# Patient Record
Sex: MALE | Race: WHITE | Employment: OTHER | URBAN - METROPOLITAN AREA
[De-identification: names, ages, dates, MRNs, and addresses within clinical notes are randomized per-mention and may not be internally consistent; named-entity substitution may affect disease eponyms.]

---

## 2017-05-26 ENCOUNTER — OFFICE VISIT (OUTPATIENT)
Dept: FAMILY MEDICINE | Facility: CLINIC | Age: 62
End: 2017-05-26

## 2017-05-26 VITALS
SYSTOLIC BLOOD PRESSURE: 128 MMHG | WEIGHT: 193 LBS | BODY MASS INDEX: 27.02 KG/M2 | DIASTOLIC BLOOD PRESSURE: 82 MMHG | TEMPERATURE: 98.6 F | HEIGHT: 71 IN

## 2017-05-26 DIAGNOSIS — Z12.5 SCREENING FOR PROSTATE CANCER: ICD-10-CM

## 2017-05-26 DIAGNOSIS — Z13.220 LIPID SCREENING: ICD-10-CM

## 2017-05-26 DIAGNOSIS — D48.5 NEOPLASM OF UNCERTAIN BEHAVIOR OF SKIN: ICD-10-CM

## 2017-05-26 DIAGNOSIS — Z23 NEED FOR VACCINATION: ICD-10-CM

## 2017-05-26 DIAGNOSIS — Z00.00 ROUTINE GENERAL MEDICAL EXAMINATION AT A HEALTH CARE FACILITY: Primary | ICD-10-CM

## 2017-05-26 DIAGNOSIS — Z11.59 NEED FOR HEPATITIS C SCREENING TEST: ICD-10-CM

## 2017-05-26 DIAGNOSIS — L82.0 INFLAMED SEBORRHEIC KERATOSIS: ICD-10-CM

## 2017-05-26 PROCEDURE — 80061 LIPID PANEL: CPT | Mod: 90 | Performed by: FAMILY MEDICINE

## 2017-05-26 PROCEDURE — 84153 ASSAY OF PSA TOTAL: CPT | Mod: 90 | Performed by: FAMILY MEDICINE

## 2017-05-26 PROCEDURE — 90736 HZV VACCINE LIVE SUBQ: CPT | Performed by: FAMILY MEDICINE

## 2017-05-26 PROCEDURE — 80053 COMPREHEN METABOLIC PANEL: CPT | Mod: 90 | Performed by: FAMILY MEDICINE

## 2017-05-26 PROCEDURE — 36415 COLL VENOUS BLD VENIPUNCTURE: CPT | Performed by: FAMILY MEDICINE

## 2017-05-26 PROCEDURE — 99396 PREV VISIT EST AGE 40-64: CPT | Mod: 25 | Performed by: FAMILY MEDICINE

## 2017-05-26 PROCEDURE — 17110 DESTRUCTION B9 LES UP TO 14: CPT | Performed by: FAMILY MEDICINE

## 2017-05-26 PROCEDURE — 86803 HEPATITIS C AB TEST: CPT | Mod: 90 | Performed by: FAMILY MEDICINE

## 2017-05-26 NOTE — MR AVS SNAPSHOT
After Visit Summary   5/26/2017    Waqar Ariza    MRN: [id number]           Patient Information     Date Of Birth          1955        Visit Information        Provider Department      5/26/2017 7:45 AM Surinder Catherine MD [facility]        Today's Diagnoses     Routine general medical examination at a health care facility    -  1    Lipid screening        Screening for prostate cancer        Need for hepatitis C screening test        Need for vaccination        Neoplasm of uncertain behavior of skin        Inflamed seborrheic keratosis          Care Instructions    Wound Care Instructions for Liquid Nitrogen Treatment   The treatment area will appear white at first. Over the next several hours a fluid-filled blister may form. The blister can be very dark. If blister appears, it will remain blistered for about a week. Then a scab will form over the area.   Leave the blistered area uncovered as long as it remains closed. If the area breaks open, you may cover it with a clean band aid. Do not pull off the skin covering the blister.   If the blistered area becomes uncomfortably filled with fluid, you may release some of the fluid by puncturing the blister with a needle that has been cleansed with alcohol.   If the skin covering the blister comes off, clean the area daily with soap and water. Apply a small amount of Vaseline and then cover with a clean band aid. Change the band aid twice daily until the skin is completely healed.   Call us if.....   1. You have signs of infection such as thick yellow or pus-like drainage from the wound site or a fever over 100 degrees Fahrenheit.   2. You have any questions or are not sure how to take care of the wound.        Preventive Health Recommendations  Male Ages 50 - 64    Yearly exam:             See your health care provider every year in order to  o   Review health changes.   o   Discuss preventive care.    o   Review your medicines if  your doctor has prescribed any.     Have a cholesterol test every 5 years, or more frequently if you are at risk for high cholesterol/heart disease.     Have a diabetes test (fasting glucose) every three years. If you are at risk for diabetes, you should have this test more often.     Have a colonoscopy at age 50, or have a yearly FIT test (stool test). These exams will check for colon cancer.      Talk with your health care provider about whether or not a prostate cancer screening test (PSA) is right for you.    You should be tested each year for STDs (sexually transmitted diseases), if you re at risk.     Shots: Get a flu shot each year. Get a tetanus shot every 10 years.     Nutrition:    Eat at least 5 servings of fruits and vegetables daily.     Eat whole-grain bread, whole-wheat pasta and brown rice instead of white grains and rice.     Talk to your provider about Calcium and Vitamin D.     Lifestyle    Exercise for at least 150 minutes a week (30 minutes a day, 5 days a week). This will help you control your weight and prevent disease.     Limit alcohol to one drink per day.     No smoking.     Wear sunscreen to prevent skin cancer.     See your dentist every six months for an exam and cleaning.     See your eye doctor every 1 to 2 years.            Follow-ups after your visit        Who to contact     If you have questions or need follow up information about today's clinic visit or your schedule please contact [facility] directly at [phone].  Normal or non-critical lab and imaging results will be communicated to you by MyChart, letter or phone within 4 business days after the clinic has received the results. If you do not hear from us within 7 days, please contact the clinic through MyChart or phone. If you have a critical or abnormal lab result, we will notify you by phone as soon as possible.  Submit refill requests through [employer] or call your pharmacy and they will forward the refill  "request to us. Please allow 3 business days for your refill to be completed.          Additional Information About Your Visit        [employer] Information     [employer] gives you secure access to your electronic health record. If you see a primary care provider, you can also send messages to your care team and make appointments. If you have questions, please call your primary care clinic.  If you do not have a primary care provider, please call [phone] and they will assist you.        Care EveryWhere ID     This is your Care EveryWhere ID. This could be used by other organizations to access your [place] medical records  [id number]        Your Vitals Were     Temperature Height BMI (Body Mass Index)             98.6  F (37  C) 1.803 m (5' 11\") 26.92 kg/m2          Blood Pressure from Last 3 Encounters:   05/26/17 128/82   09/01/16 138/88   12/14/15 140/84    Weight from Last 3 Encounters:   05/26/17 87.5 kg (193 lb)   09/01/16 97.5 kg (215 lb)   12/14/15 93.4 kg (206 lb)              We Performed the Following     Comp. Metabolic Panel (14) ([facility])     HCV Antibody ([facility])     Lipid Panel ([facility])     PSA Serum ([facility])     ZOSTER VACC LIVE SUBQ NJX        Primary Care Provider Office Phone # Fax #    Surinder Catherine [phone]       [facility] [address] [facility] [address]        Thank you!     Thank you for choosing [facility]  for your care. Our goal is always to provide you with excellent care. Hearing back from our patients is one way we can continue to improve our services. Please take a few minutes to complete the written survey that you may receive in the mail after your visit with us. Thank you!             Your Updated Medication List - Protect others around you: Learn how to safely use, store and throw away your medicines at www.disposemymeds.org.          This list is accurate as of: 5/26/17  8:29 AM.  Always use your most recent med " list.                   Brand Name Dispense Instructions for use    aspirin 81 MG EC tablet     90 tablet    Take 1 tablet (81 mg) by mouth daily       CALCIUM 600/VITAMIN D PO      Take 1 tablet by mouth daily.       DAILY MULTI PO      Take 1 tablet by mouth daily.       FISH OIL PO      Take 1 tablet by mouth daily.

## 2017-05-26 NOTE — PATIENT INSTRUCTIONS
Wound Care Instructions for Liquid Nitrogen Treatment   The treatment area will appear white at first. Over the next several hours a fluid-filled blister may form. The blister can be very dark. If blister appears, it will remain blistered for about a week. Then a scab will form over the area.   Leave the blistered area uncovered as long as it remains closed. If the area breaks open, you may cover it with a clean band aid. Do not pull off the skin covering the blister.   If the blistered area becomes uncomfortably filled with fluid, you may release some of the fluid by puncturing the blister with a needle that has been cleansed with alcohol.   If the skin covering the blister comes off, clean the area daily with soap and water. Apply a small amount of Vaseline and then cover with a clean band aid. Change the band aid twice daily until the skin is completely healed.   Call us if.....   1. You have signs of infection such as thick yellow or pus-like drainage from the wound site or a fever over 100 degrees Fahrenheit.   2. You have any questions or are not sure how to take care of the wound.        Preventive Health Recommendations  Male Ages 50   64    Yearly exam:             See your health care provider every year in order to  o   Review health changes.   o   Discuss preventive care.    o   Review your medicines if your doctor has prescribed any.     Have a cholesterol test every 5 years, or more frequently if you are at risk for high cholesterol/heart disease.     Have a diabetes test (fasting glucose) every three years. If you are at risk for diabetes, you should have this test more often.     Have a colonoscopy at age 50, or have a yearly FIT test (stool test). These exams will check for colon cancer.      Talk with your health care provider about whether or not a prostate cancer screening test (PSA) is right for you.    You should be tested each year for STDs (sexually transmitted diseases), if you re at risk.      Shots: Get a flu shot each year. Get a tetanus shot every 10 years.     Nutrition:    Eat at least 5 servings of fruits and vegetables daily.     Eat whole-grain bread, whole-wheat pasta and brown rice instead of white grains and rice.     Talk to your provider about Calcium and Vitamin D.     Lifestyle    Exercise for at least 150 minutes a week (30 minutes a day, 5 days a week). This will help you control your weight and prevent disease.     Limit alcohol to one drink per day.     No smoking.     Wear sunscreen to prevent skin cancer.     See your dentist every six months for an exam and cleaning.     See your eye doctor every 1 to 2 years.

## 2017-05-26 NOTE — PROGRESS NOTES
1 SebK's on the face. Itchy, irritating and unsightly.  Also took care of 4 on the back    ROS: No bleeding problems.    cryotherapy applied  Liquid nitrogen was applied for 5-10 seconds x3  to the skin lesions      A:P  Irritated, red and itchy  Tyler K's  The expected blistering or scabbing reaction explained. Do not pick at the areas. Patient reminded to expect hypopigmented scars from the procedure. Return if lesions fail to fully resolve.   Surinder Catherine

## 2017-05-26 NOTE — PROGRESS NOTES
SUBJECTIVE:     CC: Waqar Ariza is an 61 year old male who presents for preventative health visit.     Healthy Habits:    Do you get at least three servings of calcium containing foods daily (dairy, green leafy vegetables, etc.)? yes    Amount of exercise or daily activities, outside of work: 4 day(s) per week    Problems taking medications regularly No    Medication side effects: No    Have you had an eye exam in the past two years? yes    Do you see a dentist twice per year? yes    Do you have sleep apnea, excessive snoring or daytime drowsiness?no        Today's PHQ-2 Score:   PHQ-2 ( 1999 [employer]) 5/26/2017 11/24/2014   Q1: Little interest or pleasure in doing things 0 0   Q2: Feeling down, depressed or hopeless 0 0   PHQ-2 Score 0 0       Abuse: Current or Past(Physical, Sexual or Emotional)- No  Do you feel safe in your environment - Yes    Social History   Substance Use Topics     Smoking status: Never Smoker     Smokeless tobacco: Never Used     Alcohol use Yes      Comment: special occasions only     The patient does not drink >3 drinks per day nor >7 drinks per week.    Last PSA: No results found for: PSA    Recent Labs   Lab Test  11/24/14   1119  08/20/13   1012   CHOL  184  169   HDL  57  52   LDL  106*  89   TRIG  107  139       Reviewed orders with patient. Reviewed health maintenance and updated orders accordingly - Yes    Reviewed and updated as needed this visit by clinical staff  Tobacco         Reviewed and updated as needed this visit by Provider        Past Medical History:   Diagnosis Date     Adenomatous polyp of colon 2007    q 5 year colonoscopy     Mitral valve disorders 11/24/2014     Right fibular fracture 3/2013      Past Surgical History:   Procedure Laterality Date     CATARACT IOL, RT/LT  2004, 2009    Cataract IOL RT/LT     retinal detachment  2005       ROS:  C: NEGATIVE for fever, chills, change in weight  I: NEGATIVE for worrisome rashes, moles or lesions  E: NEGATIVE for  "vision changes or irritation  ENT: NEGATIVE for ear, mouth and throat problems  R: NEGATIVE for significant cough or SOB  CV: NEGATIVE for chest pain, palpitations or peripheral edema  GI: NEGATIVE for nausea, abdominal pain, heartburn, or change in bowel habits   male: negative for dysuria, hematuria, decreased urinary stream, erectile dysfunction, urethral discharge  M: NEGATIVE for significant arthralgias or myalgia  N: NEGATIVE for weakness, dizziness or paresthesias  P: NEGATIVE for changes in mood or affect    BP Readings from Last 3 Encounters:   05/26/17 128/82   09/01/16 138/88   12/14/15 140/84    Wt Readings from Last 3 Encounters:   05/26/17 87.5 kg (193 lb)   09/01/16 97.5 kg (215 lb)   12/14/15 93.4 kg (206 lb)                  OBJECTIVE:     /82  Temp 98.6  F (37  C)  Ht 1.803 m (5' 11\")  Wt 87.5 kg (193 lb)  BMI 26.92 kg/m2  EXAM:  GENERAL: healthy, alert and no distress  EYES: Eyes grossly normal to inspection, PERRL and conjunctivae and sclerae normal  HENT: ear canals and TM's normal, nose and mouth without ulcers or lesions  NECK: no adenopathy, no asymmetry, masses, or scars and thyroid normal to palpation  RESP: lungs clear to auscultation - no rales, rhonchi or wheezes  CV: regular rate and rhythm, normal S1 S2, no S3 or S4, no murmur, click or rub, no peripheral edema and peripheral pulses strong  ABDOMEN: soft, nontender, no hepatosplenomegaly, no masses and bowel sounds normal   (male): normal male genitalia without lesions or urethral discharge, small left  hernia  RECTAL: normal sphincter tone, no rectal masses, prostate normal size, smooth, nontender without nodules or masses  MS: no gross musculoskeletal defects noted, no edema  SKIN: no suspicious lesions or rashes  NEURO: Normal strength and tone, mentation intact and speech normal  PSYCH: mentation appears normal, affect normal/bright    ASSESSMENT/PLAN:     Diagnoses and all orders for this visit:    Routine general " "medical examination at a health care facility  -     Comp. Metabolic Panel (14) ([facility])    Lipid screening  -     Lipid Panel ([facility])    Screening for prostate cancer  -     PSA Serum ([facility])    Need for hepatitis C screening test  -     HCV Antibody ([facility])    Need for vaccination        COUNSELING:  Reviewed preventive health counseling, as reflected in patient instructions       Regular exercise       Healthy diet/nutrition       Colon cancer screening    BP Screening:   Last 3 BP Readings:    BP Readings from Last 3 Encounters:   05/26/17 128/82   09/01/16 138/88   12/14/15 140/84       The following was recommended to the patient:  Re-screen BP within a year and recommended lifestyle modifications     reports that he has never smoked. He has never used smokeless tobacco.    Estimated body mass index is 26.92 kg/(m^2) as calculated from the following:    Height as of this encounter: 1.803 m (5' 11\").    Weight as of this encounter: 87.5 kg (193 lb).       Counseling Resources:  ATP IV Guidelines  Pooled Cohorts Equation Calculator  FRAX Risk Assessment  ICSI Preventive Guidelines  Dietary Guidelines for Americans, 2010  USDA's MyPlate  ASA Prophylaxis  Lung CA Screening    Surinder Catherine MD  [facility]  "

## 2017-05-27 LAB
ALBUMIN SERPL-MCNC: 4.6 G/DL (ref 3.6–4.8)
ALBUMIN/GLOB SERPL: 1.4 {RATIO} (ref 1.2–2.2)
ALP SERPL-CCNC: 51 IU/L (ref 39–117)
ALT SERPL-CCNC: 32 IU/L (ref 0–44)
AST SERPL-CCNC: 28 IU/L (ref 0–40)
BILIRUB SERPL-MCNC: 0.6 MG/DL (ref 0–1.2)
BUN SERPL-MCNC: 15 MG/DL (ref 8–27)
BUN/CREATININE RATIO: 14 (ref 10–24)
CALCIUM SERPL-MCNC: 9.4 MG/DL (ref 8.6–10.2)
CHLORIDE SERPLBLD-SCNC: 99 MMOL/L (ref 96–106)
CHOLEST SERPL-MCNC: 148 MG/DL (ref 100–199)
CREAT SERPL-MCNC: 1.11 MG/DL (ref 0.76–1.27)
EGFR IF AFRICN AM: 82 ML/MIN/1.73
EGFR IF NONAFRICN AM: 71 ML/MIN/1.73
GLOBULIN, TOTAL: 3.2 G/DL (ref 1.5–4.5)
GLUCOSE SERPL-MCNC: 89 MG/DL (ref 65–99)
HCV AB SERPL QL IA: <0.1 S/CO RATIO (ref 0–0.9)
HDLC SERPL-MCNC: 55 MG/DL
LDL/HDL RATIO: 1.4 RATIO UNITS (ref 0–3.6)
LDLC SERPL CALC-MCNC: 75 MG/DL (ref 0–99)
POTASSIUM SERPL-SCNC: 4.5 MMOL/L (ref 3.5–5.2)
PROT SERPL-MCNC: 7.8 G/DL (ref 6–8.5)
PSA NG/ML: 0.4 NG/ML (ref 0–4)
SODIUM SERPL-SCNC: 140 MMOL/L (ref 134–144)
TOTAL CO2: 26 MMOL/L (ref 18–28)
TRIGL SERPL-MCNC: 88 MG/DL (ref 0–149)
VLDLC SERPL CALC-MCNC: 18 MG/DL (ref 5–40)

## 2017-05-30 NOTE — PROGRESS NOTES
Dear Waqar,   I am writing to report that your included test results are within expected ranges. I do not suggest that we make any changes at this time.    Surinder Catherine M.D.

## 2017-07-20 ENCOUNTER — TRANSFERRED RECORDS (OUTPATIENT)
Dept: FAMILY MEDICINE | Facility: CLINIC | Age: 62
End: 2017-07-20

## 2017-11-07 DIAGNOSIS — Z23 NEED FOR PROPHYLACTIC VACCINATION AND INOCULATION AGAINST INFLUENZA: Primary | ICD-10-CM

## 2017-11-07 PROCEDURE — 90686 IIV4 VACC NO PRSV 0.5 ML IM: CPT | Performed by: FAMILY MEDICINE

## 2017-11-07 PROCEDURE — 90471 IMMUNIZATION ADMIN: CPT | Performed by: FAMILY MEDICINE

## 2017-11-07 NOTE — PROGRESS NOTES

## 2018-08-08 ENCOUNTER — HOSPITAL ENCOUNTER (EMERGENCY)
Facility: CLINIC | Age: 63
Discharge: HOME | End: 2018-08-08
Attending: EMERGENCY MEDICINE | Admitting: EMERGENCY MEDICINE
Payer: COMMERCIAL

## 2018-08-08 VITALS
SYSTOLIC BLOOD PRESSURE: 147 MMHG | OXYGEN SATURATION: 98 % | TEMPERATURE: 98.4 F | DIASTOLIC BLOOD PRESSURE: 90 MMHG | RESPIRATION RATE: 16 BRPM

## 2018-08-08 DIAGNOSIS — S01.81XA FACIAL LACERATION, INITIAL ENCOUNTER: ICD-10-CM

## 2018-08-08 PROCEDURE — 99284 EMERGENCY DEPT VISIT MOD MDM: CPT | Mod: 25

## 2018-08-08 PROCEDURE — 12053 INTMD RPR FACE/MM 5.1-7.5 CM: CPT

## 2018-08-08 PROCEDURE — 12011 RPR F/E/E/N/L/M 2.5 CM/<: CPT

## 2018-08-08 ASSESSMENT — ENCOUNTER SYMPTOMS: WOUND: 1

## 2018-08-08 NOTE — DISCHARGE INSTRUCTIONS
* LACERATION (All Closures)  A laceration is a cut through the skin. This will usually require stitches (sutures) or staples if it is deep. Minor cuts may be treated with a tape closure ( Steri-Strips ) or Dermabond skin glue.       HOME CARE:  PAIN MEDICINE: You may use acetaminophen (Tylenol) 650-1000 mg every 6 hours or ibuprofen (Motrin, Advil) 600 mg every 6-8 hours with food to control pain, if you are able to take these medicines. [NOTE: If you have chronic liver or kidney disease or ever had a stomach ulcer or GI bleeding, talk with your doctor before using these medicines.]  EXTREMITY, FACE or TRUNK WOUNDS:    Keep the wound clean and dry. If a bandage was applied and it becomes wet or dirty, replace it. Otherwise, leave it in place for the first 24 hours.    If stitches or staples were used, clean the wound daily. Protect the wound from sunlight and tanning lamps.    After removing the bandage, wash the area with soap and water. Use a wet cotton swab (Q tip) to loosen and remove any blood or crust that forms.    After cleaning, apply a thin layer of Polysporin or Bacitracin ointment. This will keep the wound clean and make it easier to remove the stitches or staples. Reapply a fresh bandage.    You may remove the bandage to shower as usual after the first 24 hours, but do not soak the area in water (no swimming) until the stitches or staples are removed.    If Steri-Strips were used, keep the area clean and dry. If it becomes wet, blot it dry with a towel. It is okay to take a brief shower, but avoid scrubbing the area.    If Dermabond skin adhesive was used, do not scratch, rub or pick at the adhesive film. Do not place tape directly over the film. Do not apply liquid, ointment or creams to the wound while the film is in place. Do not clean the wound with peroxide and do not apply ointments. Avoid activities that cause heavy sweating until the film has fallen off. Protect the wound from prolonged  exposure to sunlight or tanning lamps. You may shower as usual but do not soak the wound in water (no baths or swimming). The film will fall off by itself in 5-10 days.  SCALP WOUNDS: During the first two days, you may carefully rinse your hair in the shower to remove blood, glass or dirt particles. After two days, you may shower and shampoo your hair normally. Do not soak your scalp in the tub or go swimming until the stitches or staples have been removed.  MOUTH WOUNDS: Eat soft foods to reduce pain. If the cut is inside of your mouth, clean by rinsing after each meal and at bedtime with a mixture of equal parts water and Hydrogen Peroxide (do not swallow!). Or, you can use a cotton swab to directly apply Hydrogen Peroxide onto the cut.  After the wound is done healing, use sunscreen over the area whenever exposed for the next 6 minths to avoid a darker scar.     FOLLOW UP: Most skin wounds heal within ten days. Mouth and facial wounds heal within five days. However, even with proper treatment, a wound infection may sometimes occur. Therefore, you should check the wound daily for signs of infection listed below.  Stitches should be removed from the face within five days; stitches and staples should be removed from other parts of the body within 7-10 days. Unless you are told to come back to the emergency room, you may have your doctor or urgent care remove the stitches. If dissolving stitches were used in the mouth, these will fall out or dissolve without the need for removal. If tape closures ( Steri-Strips ) were used, remove them yourself if they have not fallen off after 7 days. If Dermabond skin glue was used, the film will fall off by itself in 5-10 days.      GET PROMPT MEDICAL ATTENTION  if any of the following occur:    Increasing pain in the wound    Redness, swelling or pus coming from the wound    Fever over 101 F (38.3 C) oral    If stitches or staples come apart or fall out or if Steri-Strips fall  off before seven days    If the wound edges re-open    Bleeding not controlled by direct pressure    5566-1023 [employer]. [address] [place] [address], [place] [address]. All rights reserved. This information is not intended as a substitute for professional medical care. Always follow your healthcare professional's instructions.  This information has been modified by your health care provider with permission from the publisher.

## 2018-08-08 NOTE — ED AVS SNAPSHOT
Emergency Department    [address] [place] [address]    Phone:  [phone]    Fax:  [phone]                                       Waqar Ariza   MRN: [id number]    Department:   Emergency Department   Date of Visit:  8/8/2018           After Visit Summary Signature Page     I have received my discharge instructions, and my questions have been answered. I have discussed any challenges I see with this plan with the nurse or doctor.    ..........................................................................................................................................  Patient/Patient Representative Signature      ..........................................................................................................................................  Patient Representative Print Name and Relationship to Patient    ..................................................               ................................................  Date                                            Time    ..........................................................................................................................................  Reviewed by Signature/Title    ...................................................              ..............................................  Date                                                            Time

## 2018-08-08 NOTE — ED PROVIDER NOTES
History     Chief Complaint:  Facial injuries    HPI   Waqar Ariza is a 62 year old male on Aspirin who presents with a facial injury. The patient reports he was walking his dog this morning until he sustained lacerations to his face after walking into a sign post due to the sun being in his eyes. He did not fall or lose consciousness and is currently ambulatory. He has a laceration to the right side of his forehead and extends over his right eye, as well as a laceration to his right cheek. Denies any other trauma, injuries or symptoms. Last tetanus was in 2010.     Allergies:  No Known Allergies     Medications:    Aspirin  Calcium 600  Daily multi  Omega 3 fatty acids     Past Medical History:    Mitral valve disorders  Adenomatous polyp of colon    Past Surgical History:    Cataract IOL  Retinal detachment     Family History:    History reviewed. No pertinent family history.      Social History:  Smoking status: Never smoker  Alcohol use: Yes   Marital Status:  Single [1]  PCP: Surinder Catherine      Review of Systems   Skin: Positive for wound.   Neurological: Negative for syncope.   All other systems reviewed and are negative.    Physical Exam   Patient Vitals for the past 24 hrs:   BP Temp Temp src Heart Rate Resp SpO2   08/08/18 1004 - - - - 16 -   08/08/18 0745 147/90 98.4  F (36.9  C) Oral 89 - -   08/08/18 0744 - - - - 14 98 %      Physical Exam  Constitution: White male supine.  HENT: 6.5 cm L-shaped laceration. Full thickness right forehead extending over supraorbital rim. PERRL. EOMI. 2 cm partial thickness flap laceration over right cheek. No bony tenderness.  Neck: No midline cervical tenderness. Full range of motion.  Neurological: Awake, alert, appropriate. Normal motor sensation and coordination. GCS 15.     Emergency Department Course   Procedure:  The forehead and supraorbital laceration was cleaned with Shur Clens, 0.5% Bupivacaine with epi used for local anesthesia. The wound was  irrigated and explored to the base. No foreign body was seen. It was very flap-like in nature. I used 5.0 Vicryl deep buried subcutaneous stitches and 6.0 Ethylon  skin sutures,  closed in 2 layers. . The laceration over the cheek which is a partial thickness flap, with a small area I could approximate, the rest was more abraded and this was closed with 6.0 Ethylon simple.    Emergency Department Course:  Past medical records, nursing notes, and vitals reviewed.  0757: I performed an exam of the patient and obtained history, as documented above.    0840: I performed a laceration repair as detailed above.  Patient discharged home with instructions regarding supportive care, medications, and reasons to return. The importance of close follow-up was reviewed.      Impression & Plan    Medical Decision Making:  Waqar Ariza is a 62 year old who was walking his dog and because the sun was bright, he could not see where he was going and he walked into a signpost. He suffered a laceration to his forehead and a lesser laceration over the right cheek. He did not hurt his neck, develop a headache, or have any other focal problems. His tetanus is up-to-date. Plan is wound sheets, sutures out in 7-10 days.     Diagnosis:    ICD-10-CM   1. Multiple facial lacerations, initial encounter S01.81XA     Disposition:  discharged to home    Dominga Gutiérrez  8/8/2018    EMERGENCY DEPARTMENT  Dominga BYERS Do, am serving as a scribe at 7:57 AM on 8/8/2018 to document services personally performed by Kal Schulz MD based on my observations and the provider's statements to me.       Kal Schulz MD  08/08/18 6082

## 2018-08-08 NOTE — ED AVS SNAPSHOT
Emergency Department    [address] [facility] [address]    Phone:  [phone]    Fax:  [phone]                                       Waqar Ariza   MRN: [id number]    Department:   Emergency Department   Date of Visit:  8/8/2018           Patient Information     Date Of Birth          1955        Your diagnoses for this visit were:     Facial laceration, initial encounter        You were seen by Kal Schulz MD.      Follow-up Information     Follow up with Surinder Catherine MD In 7 days.    Specialty:  Family Practice    Why:  For suture removal    Contact information:    [address] [place] [address] [phone]          Discharge Instructions          * LACERATION (All Closures)  A laceration is a cut through the skin. This will usually require stitches (sutures) or staples if it is deep. Minor cuts may be treated with a tape closure ( Steri-Strips ) or Dermabond skin glue.       HOME CARE:  PAIN MEDICINE: You may use acetaminophen (Tylenol) 650-1000 mg every 6 hours or ibuprofen (Motrin, Advil) 600 mg every 6-8 hours with food to control pain, if you are able to take these medicines. [NOTE: If you have chronic liver or kidney disease or ever had a stomach ulcer or GI bleeding, talk with your doctor before using these medicines.]  EXTREMITY, FACE or TRUNK WOUNDS:    Keep the wound clean and dry. If a bandage was applied and it becomes wet or dirty, replace it. Otherwise, leave it in place for the first 24 hours.    If stitches or staples were used, clean the wound daily. Protect the wound from sunlight and tanning lamps.    After removing the bandage, wash the area with soap and water. Use a wet cotton swab (Q tip) to loosen and remove any blood or crust that forms.    After cleaning, apply a thin layer of Polysporin or Bacitracin ointment. This will keep the wound clean and make it easier to remove the stitches or staples. Reapply a fresh  bandage.    You may remove the bandage to shower as usual after the first 24 hours, but do not soak the area in water (no swimming) until the stitches or staples are removed.    If Steri-Strips were used, keep the area clean and dry. If it becomes wet, blot it dry with a towel. It is okay to take a brief shower, but avoid scrubbing the area.    If Dermabond skin adhesive was used, do not scratch, rub or pick at the adhesive film. Do not place tape directly over the film. Do not apply liquid, ointment or creams to the wound while the film is in place. Do not clean the wound with peroxide and do not apply ointments. Avoid activities that cause heavy sweating until the film has fallen off. Protect the wound from prolonged exposure to sunlight or tanning lamps. You may shower as usual but do not soak the wound in water (no baths or swimming). The film will fall off by itself in 5-10 days.  SCALP WOUNDS: During the first two days, you may carefully rinse your hair in the shower to remove blood, glass or dirt particles. After two days, you may shower and shampoo your hair normally. Do not soak your scalp in the tub or go swimming until the stitches or staples have been removed.  MOUTH WOUNDS: Eat soft foods to reduce pain. If the cut is inside of your mouth, clean by rinsing after each meal and at bedtime with a mixture of equal parts water and Hydrogen Peroxide (do not swallow!). Or, you can use a cotton swab to directly apply Hydrogen Peroxide onto the cut.  After the wound is done healing, use sunscreen over the area whenever exposed for the next 6 minths to avoid a darker scar.     FOLLOW UP: Most skin wounds heal within ten days. Mouth and facial wounds heal within five days. However, even with proper treatment, a wound infection may sometimes occur. Therefore, you should check the wound daily for signs of infection listed below.  Stitches should be removed from the face within five days; stitches and staples should  be removed from other parts of the body within 7-10 days. Unless you are told to come back to the emergency room, you may have your doctor or urgent care remove the stitches. If dissolving stitches were used in the mouth, these will fall out or dissolve without the need for removal. If tape closures ( Steri-Strips ) were used, remove them yourself if they have not fallen off after 7 days. If Dermabond skin glue was used, the film will fall off by itself in 5-10 days.      GET PROMPT MEDICAL ATTENTION  if any of the following occur:    Increasing pain in the wound    Redness, swelling or pus coming from the wound    Fever over 101 F (38.3 C) oral    If stitches or staples come apart or fall out or if Steri-Strips fall off before seven days    If the wound edges re-open    Bleeding not controlled by direct pressure    8437-8956 [employer]. [address] [place] [address]. All rights reserved. This information is not intended as a substitute for professional medical care. Always follow your healthcare professional's instructions.  This information has been modified by your health care provider with permission from the publisher.      24 Hour Appointment Hotline       To make an appointment at any [facility], call [phone] ([phone]). If you don't have a family doctor or clinic, we will help you find one. [facility] are conveniently located to serve the needs of you and your family.             Review of your medicines      Our records show that you are taking the medicines listed below. If these are incorrect, please call your family doctor or clinic.        Dose / Directions Last dose taken    aspirin 81 MG EC tablet   Dose:  81 mg   Quantity:  90 tablet        Take 1 tablet (81 mg) by mouth daily   Refills:  3        CALCIUM 600/VITAMIN D PO   Dose:  1 tablet        Take 1 tablet by mouth daily.   Refills:  0        DAILY MULTI PO   Dose:  1 tablet        Take 1 tablet  by mouth daily.   Refills:  0        FISH OIL PO   Dose:  1 tablet        Take 1 tablet by mouth daily.   Refills:  0                Orders Needing Specimen Collection     None      Pending Results     No orders found from 8/6/2018 to 8/9/2018.            Pending Culture Results     No orders found from 8/6/2018 to 8/9/2018.            Pending Results Instructions     If you had any lab results that were not finalized at the time of your Discharge, you can call the ED Lab Result RN at [phone]. You will be contacted by this team for any positive Lab results or changes in treatment. The nurses are available 7 days a week from 10A to 6:30P.  You can leave a message 24 hours per day and they will return your call.        Test Results From Your Hospital Stay               Clinical Quality Measure: Blood Pressure Screening     Your blood pressure was checked while you were in the emergency department today. The last reading we obtained was  BP: 147/90 . Please read the guidelines below about what these numbers mean and what you should do about them.  If your systolic blood pressure (the top number) is less than 120 and your diastolic blood pressure (the bottom number) is less than 80, then your blood pressure is normal. There is nothing more that you need to do about it.  If your systolic blood pressure (the top number) is 120-139 or your diastolic blood pressure (the bottom number) is 80-89, your blood pressure may be higher than it should be. You should have your blood pressure rechecked within a year by a primary care provider.  If your systolic blood pressure (the top number) is 140 or greater or your diastolic blood pressure (the bottom number) is 90 or greater, you may have high blood pressure. High blood pressure is treatable, but if left untreated over time it can put you at risk for heart attack, stroke, or kidney failure. You should have your blood pressure rechecked by a primary care provider within the  next 4 weeks.  If your provider in the emergency department today gave you specific instructions to follow-up with your doctor or provider even sooner than that, you should follow that instruction and not wait for up to 4 weeks for your follow-up visit.        Thank you for choosing [facility]       Thank you for choosing [facility] for your care. Our goal is always to provide you with excellent care. Hearing back from our patients is one way we can continue to improve our services. Please take a few minutes to complete the written survey that you may receive in the mail after you visit with us. Thank you!        [employer] Information     [employer] gives you secure access to your electronic health record. If you see a primary care provider, you can also send messages to your care team and make appointments. If you have questions, please call your primary care clinic.  If you do not have a primary care provider, please call [phone] and they will assist you.        Care EveryWhere ID     This is your Care EveryWhere ID. This could be used by other organizations to access your [facility] medical records  [id number]        Equal Access to Services     MANDI SMITH : Hadii tera gaticao Sofrederick, waaxda luqadaha, qaybta kaalmamali loo, melissa dumont . So [facility] [phone].    ATENCIÓN: Si habla español, tiene a armando disposición servicios gratuitos de asistencia lingüística. Llame al [phone].    We comply with applicable federal civil rights laws and [place] laws. We do not discriminate on the basis of race, color, national origin, age, disability, sex, sexual orientation, or gender identity.            After Visit Summary       This is your record. Keep this with you and show to your community pharmacist(s) and doctor(s) at your next visit.

## 2018-08-15 ENCOUNTER — OFFICE VISIT (OUTPATIENT)
Dept: FAMILY MEDICINE | Facility: CLINIC | Age: 63
End: 2018-08-15

## 2018-08-15 VITALS
SYSTOLIC BLOOD PRESSURE: 126 MMHG | HEART RATE: 79 BPM | DIASTOLIC BLOOD PRESSURE: 72 MMHG | BODY MASS INDEX: 26.43 KG/M2 | RESPIRATION RATE: 16 BRPM | HEIGHT: 71 IN | WEIGHT: 188.8 LBS | TEMPERATURE: 98.2 F | OXYGEN SATURATION: 99 %

## 2018-08-15 DIAGNOSIS — Z48.02 VISIT FOR SUTURE REMOVAL: Primary | ICD-10-CM

## 2018-08-15 PROCEDURE — 99213 OFFICE O/P EST LOW 20 MIN: CPT | Performed by: FAMILY MEDICINE

## 2018-08-15 NOTE — PROGRESS NOTES
"Problem(s) Oriented visit      SUBJECTIVE:                                                    Waqar Ariza is a 62 year old male who presents to clinic today for:  Patient presents with:  Suture Removal: From head  ER F/U: 8/8    Sutures placed 8/8/18 at [facility] ED - pt was walking his dog. [place] w/ multiple repositioned signposts. Ran into a signpost at 8 am while out walking his dog. Stood in median bleeding, no cars would stop to help him. 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 He describes massive blood from forehead and eyebrow wound. Was instructed to see PCP to remove sutures at 7-10 days. Today #7.     No pain. No HA. No bleeding. No erythema. He has been very gentle when washing his face.     Problem list, Medication list, Allergies, and Medical/Social/Surgical histories reviewed in [facility] and updated as appropriate.     ROS:  5 point ROS completed and negative except noted above, including Gen, CV, Resp, GI, MS    Histories:   Patient Active Problem List   Diagnosis     Adenomatous polyp of colon     Advanced directives, counseling/discussion     Mitral valve disorder     Past Medical History:   Diagnosis Date     Adenomatous polyp of colon 2007    q 5 year colonoscopy     Mitral valve disorders(424.0) 11/24/2014     Right fibular fracture 3/2013     Past Surgical History:   Procedure Laterality Date     CATARACT IOL, RT/LT  2004, 2009    Cataract IOL RT/LT     retinal detachment  2005     Social History   Substance Use Topics     Smoking status: Never Smoker     Smokeless tobacco: Never Used     Alcohol use Yes      Comment: special occasions only     No family history on file.    OBJECTIVE:                                                    /72  Pulse 79  Temp 98.2  F (36.8  C) (Oral)  Resp 16  Ht 1.81 m (5' 11.25\")  Wt 85.6 kg (188 lb 12.8 oz)  SpO2 99%  BMI 26.15 kg/m2  Body mass index is 26.15 kg/(m^2).   Gen: Cooperative. " Very pleasant. Is embarrassed about the accident - states he will think it is funny in the future, but not yet.   Eyes: PERRL, sclera white. Able to move eyes to end ROM w/o nystagmus or c/o pain. R eyebrow is crusted w/ large vol dried blood, occluding sutures.   Neck: Supple, without masses, lymphadenopathy or tenderness.   Lungs: Normal respiratory effort.    Skin: Warm and well perfused. 6.5 cm L-shaped laceration on R forehead and into R eyebrow w/ Nylon sutures present. Skin is generally well approximated. 2 cm wound on R cheek w/ sutures. Skin edges are well approximated. No drainage. Crusting present over all of the skin edges. No erythema.   Neurologic: Alert, nonfocal.   Psych:  Mood and affect are appropriate.     ASSESSMENT/PLAN:                                                      Waqar was seen today for suture removal and er f/u.    Diagnoses and all orders for this visit:    Visit for suture removal    Wounds are healing nicely as expected.   Removed sutures from forehead and face. Nursing cleaned up dried blood to allow remaining sutures to be visualized. Removed ~25 sutures today. Applied Bacitracin and steri-strips to the forehead wound bed and the R cheek wound. No steris applied over R eyebrow.  Pt tolerated procedure well.     Patient Instructions   The wounds look great today. Healing as planned.     The steri strips will peel off on their own in about 7 days.   As the edges curl up, you can just trim them off.   OK to put a small qty of Bacitracin over the steris twice a day to keep things moist.     If the steris come off early, that's OK. Just be gentle.     Call if anything starts to look red, drain or becomes painful.     If you add some Vitamin C to your diet, you may heal faster.     Please use sunblock for the next year on this new skin.     If the scar is red after healing, OK to use small amt of Mederma - from any pharmacy or grocery store for cosmetic purposes.     >20 min spent  with patient, greater than 50% spent on discussion/education/planning, etc. About The encounter diagnosis was Visit for suture removal.    The following health maintenance items are reviewed in Epic and correct as of today:  Health Maintenance   Topic Date Due     HIV SCREEN (SYSTEM ASSIGNED)  12/03/1973     ADVANCE DIRECTIVE PLANNING Q5 YRS  07/26/2017     PHQ-2 Q1 YR  05/26/2018     INFLUENZA VACCINE (1) 09/01/2018     TETANUS IMMUNIZATION (SYSTEM ASSIGNED)  07/26/2020     LIPID SCREEN Q5 YR MALE (SYSTEM ASSIGNED)  05/26/2022     COLONOSCOPY Q5 YR  07/20/2022     HEPATITIS C SCREENING  Completed       Annalee Blackwell MD  Family Medicine    For any issues, please call my office  [facility] at [phone].

## 2018-08-15 NOTE — PATIENT INSTRUCTIONS
The wounds look great today. Healing as planned.     The steri strips will peel off on their own in about 7 days.   As the edges curl up, you can just trim them off.   OK to put a small qty of Bacitracin over the steris twice a day to keep things moist.     If the steris come off early, that's OK. Just be gentle.     Call if anything starts to look red, drain or becomes painful.     If you add some Vitamin C to your diet, you may heal faster.     Please use sunblock for the next year on this new skin.     If the scar is red after healing, OK to use small amt of Mederma - from any pharmacy or grocery store for cosmetic purposes.

## 2018-08-15 NOTE — MR AVS SNAPSHOT
After Visit Summary   8/15/2018    Waqar Ariza    MRN: [id number]           Patient Information     Date Of Birth          1955        Visit Information        Provider Department      8/15/2018 7:45 AM Annalee Blackwell MD [facility]        Care Instructions    The wounds look great today. Healing as planned.     The steri strips will peel off on their own in about 7 days.   As the edges curl up, you can just trim them off.   OK to put a small qty of Bacitracin over the steris twice a day to keep things moist.     If the steris come off early, that's OK. Just be gentle.     Call if anything starts to look red, drain or becomes painful.     If you add some Vitamin C to your diet, you may heal faster.     Please use sunblock for the next year on this new skin.     If the scar is red after healing, OK to use small amt of Mederma - from any pharmacy or grocery store for cosmetic purposes.           Follow-ups after your visit        Who to contact     If you have questions or need follow up information about today's clinic visit or your schedule please contact [facility] directly at [phone].  Normal or non-critical lab and imaging results will be communicated to you by [employer], letter or phone within 4 business days after the clinic has received the results. If you do not hear from us within 7 days, please contact the clinic through [employer] or phone. If you have a critical or abnormal lab result, we will notify you by phone as soon as possible.  Submit refill requests through [employer] or call your pharmacy and they will forward the refill request to us. Please allow 3 business days for your refill to be completed.          Additional Information About Your Visit        MyChart Information     [employer] gives you secure access to your electronic health record. If you see a primary care provider, you can also send messages to your care team and make appointments.  "If you have questions, please call your primary care clinic.  If you do not have a primary care provider, please call [phone] and they will assist you.        Care EveryWhere ID     This is your Care EveryWhere ID. This could be used by other organizations to access your [facility] medical records  [id number]        Your Vitals Were     Pulse Temperature Respirations Height Pulse Oximetry BMI (Body Mass Index)    79 98.2  F (36.8  C) (Oral) 16 1.81 m (5' 11.25\") 99% 26.15 kg/m2       Blood Pressure from Last 3 Encounters:   08/15/18 126/72   08/08/18 147/90   05/26/17 128/82    Weight from Last 3 Encounters:   08/15/18 85.6 kg (188 lb 12.8 oz)   05/26/17 87.5 kg (193 lb)   09/01/16 97.5 kg (215 lb)              Today, you had the following     No orders found for display       Primary Care Provider Office Phone # Fax #    Surinder Catherine [phone] [address]  [facility] [address]        Equal Access to Services     [facility]: Hadii aad ku hadasho Soomaali, waaxda luqadaha, qaybta kaalmada adeegyada, melissa dumont . So [facility] [phone].    ATENCIÓN: Si habla español, tiene a armando disposición servicios gratuitos de asistencia lingüística. Llame al [phone].    We comply with applicable federal civil rights laws and [place] laws. We do not discriminate on the basis of race, color, national origin, age, disability, sex, sexual orientation, or gender identity.            Thank you!     Thank you for choosing [facility]  for your care. Our goal is always to provide you with excellent care. Hearing back from our patients is one way we can continue to improve our services. Please take a few minutes to complete the written survey that you may receive in the mail after your visit with us. Thank you!             Your Updated Medication List - Protect others around you: Learn how to safely use, store and throw away your medicines at " www.disposemymeds.org.          This list is accurate as of 8/15/18  9:01 AM.  Always use your most recent med list.                   Brand Name Dispense Instructions for use Diagnosis    aspirin 81 MG EC tablet     90 tablet    Take 1 tablet (81 mg) by mouth daily    Routine general medical examination at a health care facility       CALCIUM 600/VITAMIN D PO      Take 1 tablet by mouth daily.        DAILY MULTI PO      Take 1 tablet by mouth daily.        FISH OIL PO      Take 1 tablet by mouth daily.

## 2018-11-06 DIAGNOSIS — Z23 NEED FOR PROPHYLACTIC VACCINATION AND INOCULATION AGAINST INFLUENZA: Primary | ICD-10-CM

## 2018-11-06 PROCEDURE — 90686 IIV4 VACC NO PRSV 0.5 ML IM: CPT | Performed by: FAMILY MEDICINE

## 2018-11-06 PROCEDURE — 90471 IMMUNIZATION ADMIN: CPT | Performed by: FAMILY MEDICINE

## 2018-11-06 NOTE — PROGRESS NOTES

## 2019-02-20 ENCOUNTER — OFFICE VISIT (OUTPATIENT)
Dept: FAMILY MEDICINE | Facility: CLINIC | Age: 64
End: 2019-02-20

## 2019-02-20 VITALS — BODY MASS INDEX: 25.76 KG/M2 | WEIGHT: 186 LBS | DIASTOLIC BLOOD PRESSURE: 80 MMHG | SYSTOLIC BLOOD PRESSURE: 120 MMHG

## 2019-02-20 DIAGNOSIS — H61.21 IMPACTED CERUMEN OF RIGHT EAR: Primary | ICD-10-CM

## 2019-02-20 PROCEDURE — 69210 REMOVE IMPACTED EAR WAX UNI: CPT | Performed by: FAMILY MEDICINE

## 2019-02-20 PROCEDURE — 99212 OFFICE O/P EST SF 10 MIN: CPT | Mod: 25 | Performed by: FAMILY MEDICINE

## 2019-02-20 NOTE — PROGRESS NOTES
SUBJECTIVE:  Waqar Ariza is a 63 year old male who presents with right ear blockage for 1 week(s).   Severity: moderate   Timing:gradual onset and still present  Additional symptoms include tried using otc remedies without much help.      History of recurrent otitis: no    Past Medical History:   Diagnosis Date     Adenomatous polyp of colon 2007    q 5 year colonoscopy     Mitral valve disorders(424.0) 11/24/2014     Right fibular fracture 3/2013     Current Outpatient Medications   Medication Sig Dispense Refill     aspirin 81 MG EC tablet Take 1 tablet (81 mg) by mouth daily 90 tablet 3     Calcium Carbonate-Vitamin D (CALCIUM 600/VITAMIN D PO) Take 1 tablet by mouth daily.       Multiple Vitamins-Minerals (DAILY MULTI PO) Take 1 tablet by mouth daily.       Omega-3 Fatty Acids (FISH OIL PO) Take 1 tablet by mouth daily.       Social History     Tobacco Use     Smoking status: Never Smoker     Smokeless tobacco: Never Used   Substance Use Topics     Alcohol use: Yes     Comment: special occasions only       ROS:   CONSTITUTIONAL:NEGATIVE for fever, chills, change in weight  INTEGUMENTARY/SKIN: NEGATIVE for worrisome rashes, moles or lesions    OBJECTIVE:  /80   Wt 84.4 kg (186 lb)   BMI 25.76 kg/m     EXAM:  The right TM is not visualized secondary to cerumen     The right auditory canal is obstructed with cerumen  The left TM is normal: no effusions, no erythema, and normal landmarks  The left auditory canal is normal and without drainage, edema or erythema  Oropharynx exam is normal: no lesions, erythema, adenopathy or exudate.  GENERAL: no acute distress  EYES: EOMI,  PERRL, conjunctiva clear  NECK: supple, non-tender to palpation, no adenopathy noted  RESP: lungs clear to auscultation - no rales, rhonchi or wheezes  CV: regular rates and rhythm, normal S1 S2, no murmur noted  SKIN: no suspicious lesions or rashes     ASSESSMENT:  Cerumen impaction, R    PLAN:  Hard, mildly foul-smelling earwax was  noted in the right external auditory canal.  Patient tolerated this procedure well, and was removed by myself manually, as well as with syringe irrigation.  Prevention discussed.    See orders in Epic

## 2019-04-25 ENCOUNTER — OFFICE VISIT (OUTPATIENT)
Dept: FAMILY MEDICINE | Facility: CLINIC | Age: 64
End: 2019-04-25

## 2019-04-25 VITALS
HEIGHT: 72 IN | WEIGHT: 187 LBS | RESPIRATION RATE: 16 BRPM | BODY MASS INDEX: 25.33 KG/M2 | OXYGEN SATURATION: 99 % | HEART RATE: 83 BPM | SYSTOLIC BLOOD PRESSURE: 134 MMHG | DIASTOLIC BLOOD PRESSURE: 72 MMHG

## 2019-04-25 DIAGNOSIS — R42 DIZZINESS: Primary | ICD-10-CM

## 2019-04-25 LAB
% GRANULOCYTES: 70.2 % (ref 42.2–75.2)
HCT VFR BLD AUTO: 38.6 % (ref 39–51)
HEMOGLOBIN: 12.8 G/DL (ref 13.4–17.5)
LYMPHOCYTES NFR BLD AUTO: 22.7 % (ref 20.5–51.1)
MCH RBC QN AUTO: 28 PG (ref 27–31)
MCHC RBC AUTO-ENTMCNC: 33.1 G/DL (ref 33–37)
MCV RBC AUTO: 84.6 FL (ref 80–100)
MONOCYTES NFR BLD AUTO: 7.1 % (ref 1.7–9.3)
PLATELET # BLD AUTO: 182 K/UL (ref 140–450)
RBC # BLD AUTO: 4.56 X10/CMM (ref 4.2–5.9)
WBC # BLD AUTO: 6.4 X10/CMM (ref 3.8–11)

## 2019-04-25 PROCEDURE — 85025 COMPLETE CBC W/AUTO DIFF WBC: CPT | Performed by: FAMILY MEDICINE

## 2019-04-25 PROCEDURE — 36415 COLL VENOUS BLD VENIPUNCTURE: CPT | Performed by: FAMILY MEDICINE

## 2019-04-25 PROCEDURE — 99213 OFFICE O/P EST LOW 20 MIN: CPT | Performed by: FAMILY MEDICINE

## 2019-04-25 RX ORDER — SODIUM PHOSPHATE,MONO-DIBASIC 19G-7G/118
1 ENEMA (ML) RECTAL DAILY
COMMUNITY
End: 2022-06-16

## 2019-04-25 ASSESSMENT — MIFFLIN-ST. JEOR: SCORE: 1681.23

## 2019-04-25 NOTE — PROGRESS NOTES
Patient has been feeling poorly over the past few days.   He had some vertigo yesterday after a long day in the yard.   He has been donating blood regularly and noted that his hgb was borderline low pre-donation last week.  He doesn't eat much red meat but has been trying to eat eggs, spinach, etc.    ROS otherwise negative including sleep, neuro, CV, skin or GI     /72   Pulse 83   Resp 16   Wt 84.8 kg (187 lb)   SpO2 99%   BMI 25.90 kg/m      GENERAL: healthy, alert and no distress  EYES: Eyes grossly normal to inspection, PERRL and conjunctivae and sclerae normal  HENT: ear canals and TM's normal, nose and mouth without ulcers or lesions  NECK: no adenopathy, no asymmetry, masses, or scars and thyroid normal to palpation  RESP: lungs clear to auscultation - no rales, rhonchi or wheezes  CV: regular rate and rhythm, normal S1 S2, no S3 or S4, no murmur, click or rub, no peripheral edema and peripheral pulses strong  MS: no gross musculoskeletal defects noted, no edema  SKIN: no suspicious lesions or rashes  NEURO: Normal strength and tone, mentation intact and speech normal  PSYCH: mentation appears normal, affect normal/bright    ASSESSMENT:  1. Dizziness  hgb is slightly low.   Dietary recommendations discussed    Space out donations.   See back in 2 weeks for cpe  - CBC with Diff/Plt ([facility])

## 2019-05-31 ENCOUNTER — OFFICE VISIT (OUTPATIENT)
Dept: FAMILY MEDICINE | Facility: CLINIC | Age: 64
End: 2019-05-31

## 2019-05-31 VITALS
WEIGHT: 187.2 LBS | HEART RATE: 88 BPM | HEIGHT: 72 IN | RESPIRATION RATE: 16 BRPM | SYSTOLIC BLOOD PRESSURE: 128 MMHG | DIASTOLIC BLOOD PRESSURE: 72 MMHG | BODY MASS INDEX: 25.35 KG/M2

## 2019-05-31 DIAGNOSIS — Z13.6 SCREENING FOR HEART DISEASE: ICD-10-CM

## 2019-05-31 DIAGNOSIS — Z00.00 ROUTINE GENERAL MEDICAL EXAMINATION AT A HEALTH CARE FACILITY: ICD-10-CM

## 2019-05-31 DIAGNOSIS — Z12.5 SCREENING FOR PROSTATE CANCER: ICD-10-CM

## 2019-05-31 DIAGNOSIS — M17.0 PRIMARY OSTEOARTHRITIS OF BOTH KNEES: ICD-10-CM

## 2019-05-31 DIAGNOSIS — D64.9 ANEMIA, UNSPECIFIED TYPE: ICD-10-CM

## 2019-05-31 DIAGNOSIS — Z01.10 PASSED HEARING SCREENING: Primary | ICD-10-CM

## 2019-05-31 LAB
% GRANULOCYTES: 66.3 % (ref 42.2–75.2)
HCT VFR BLD AUTO: 41.3 % (ref 39–51)
HEMOGLOBIN: 13.5 G/DL (ref 13.4–17.5)
LYMPHOCYTES NFR BLD AUTO: 25.7 % (ref 20.5–51.1)
MCH RBC QN AUTO: 27 PG (ref 27–31)
MCHC RBC AUTO-ENTMCNC: 32.6 G/DL (ref 33–37)
MCV RBC AUTO: 82.5 FL (ref 80–100)
MONOCYTES NFR BLD AUTO: 8 % (ref 1.7–9.3)
PLATELET # BLD AUTO: 211 K/UL (ref 140–450)
RBC # BLD AUTO: 5 X10/CMM (ref 4.2–5.9)
WBC # BLD AUTO: 6.6 X10/CMM (ref 3.8–11)

## 2019-05-31 PROCEDURE — 36415 COLL VENOUS BLD VENIPUNCTURE: CPT | Performed by: FAMILY MEDICINE

## 2019-05-31 PROCEDURE — 85025 COMPLETE CBC W/AUTO DIFF WBC: CPT | Performed by: FAMILY MEDICINE

## 2019-05-31 PROCEDURE — 80053 COMPREHEN METABOLIC PANEL: CPT | Mod: 90 | Performed by: FAMILY MEDICINE

## 2019-05-31 PROCEDURE — 84153 ASSAY OF PSA TOTAL: CPT | Mod: 90 | Performed by: FAMILY MEDICINE

## 2019-05-31 PROCEDURE — 99396 PREV VISIT EST AGE 40-64: CPT | Performed by: FAMILY MEDICINE

## 2019-05-31 PROCEDURE — 92551 PURE TONE HEARING TEST AIR: CPT | Performed by: FAMILY MEDICINE

## 2019-05-31 PROCEDURE — 80061 LIPID PANEL: CPT | Mod: 90 | Performed by: FAMILY MEDICINE

## 2019-05-31 ASSESSMENT — MIFFLIN-ST. JEOR: SCORE: 1678.16

## 2019-05-31 NOTE — PATIENT INSTRUCTIONS
"  Preventive Health Recommendations  Male Ages 50 - 64    Yearly exam:             See your health care provider every year in order to  o   Review health changes.   o   Discuss preventive care.    o   Review your medicines if your doctor has prescribed any.     Have a cholesterol test every 5 years, or more frequently if you are at risk for high cholesterol/heart disease.     Have a diabetes test (fasting glucose) every three years. If you are at risk for diabetes, you should have this test more often.     Have a colonoscopy at age 50, or have a yearly FIT test (stool test). These exams will check for colon cancer.      Talk with your health care provider about whether or not a prostate cancer screening test (PSA) is right for you.    You should be tested each year for STDs (sexually transmitted diseases), if you re at risk.     Shots: Get a flu shot each year. Get a tetanus shot every 10 years.     Nutrition:    Eat at least 5 servings of fruits and vegetables daily.     Eat whole-grain bread, whole-wheat pasta and brown rice instead of white grains and rice.     Get adequate Calcium and Vitamin D.     Lifestyle    Exercise for at least 150 minutes a week (30 minutes a day, 5 days a week). This will help you control your weight and prevent disease.     Limit alcohol to one drink per day.     No smoking.     Wear sunscreen to prevent skin cancer.     See your dentist every six months for an exam and cleaning.     See your eye doctor every 1 to 2 years.  Anti-Oxidants  ?In pathologic states, oxygen free radicals and peroxides may be present in relative excess. This shift of the balance in favor of oxidation, termed \"oxidative stress,\" may have detrimental effects on cellular and tissue function, contributing to the pathogenesis of a wide variety of disease states, including heart failure (HF). (See 'Role of oxidative stress in myocardial failure' above.)  ?Experimental findings suggest that oxygen-derived free " "radicals can exert direct toxic effects on myocardial structure and function and that free radical scavengers (eg, antioxidants) may reverse these effects. (See 'Animal studies of oxidative stress and heart failure' above.)  ?There is in vivo data suggesting that oxidative stress is increased in patients with chronic HF and that markers of oxidative stress may predict outcome. (See 'Oxidative stress in human heart failure' above.)  ?Data from animal and human physiology studies suggest that antioxidant therapy improves a number of measures of cardiovascular structure and function. (See 'Studies of antioxidant therapy' above.)  ?At present, there is little direct evidence that antioxidant therapy or other nutritional supplements reduce morbidity or mortality in patients with HF and their use is not recommended. (See 'Studies of antioxidant therapy' above and \"Investigational and emerging strategies for management of heart failure\", section on 'Nutritional supplements'.)    ASA Calculator    8.2%  10-year risk of heart disease or stroke      On the basis of your age and calculated risk for heart disease or stroke under 10%, the USPSTF guidelines suggest you would not likely benefit from starting aspirin.  On the basis of your age and calculated risk for heart disease or stroke over 7.5%, the ACC/AHA guidelines suggest you should be on a moderate to high intensity statin.  Based on your age, your blood pressure is well-controlled.    Demography Cholesterol Blood pressure Risk factors   Age: 63 Total: 148 Systolic: 128 Diabetes: no   Gender: male HDL: 55 Diastolic: 72 Smoking: no   Race: not African-American  On medication: no    Notes and further reading    Moderate intensity statin may be atorvastatin 10mg, pravastatin 40mg, or simvastatin 20-40mg. High intensity statin may be atorvastatin 40mg-80mg.    AHA/ACC guidelines stress the importance of lifestyle modifications to lower cardiovascular disease risk in all " patients. This includes eating a heart-healthy diet, regular aerobic exercises, maintenance of desirable body weight and avoidance of tobacco products.    Before initiating statin therapy, clinicians and patients ought to engage in a discussion which considers addressing risk factors such as smoking and optimal lifestyle, the potential for ASCVD risk reduction benefits, adverse medication effects, drug-drug interactions, and patient preferences for treatment.    Additional factors may be considered to inform treatment decision making. These factors may include primary LDL-C greater than 160 mg/dL or other evidence of genetic hyperlipidemias, family history of premature ASCVD with onset less than 55 years of age in a first degree male relative or less than 65 years of age in a first degree female relative, high-sensitivity C-reactive protein greater than 2 mg/L, CAC score greater than 300 Agatston units or greater than 75 percentile for age, sex, and ethnicity, ankle-brachial index less than 0.9, or elevated lifetime risk of ASCVD.

## 2019-05-31 NOTE — PROGRESS NOTES
SUBJECTIVE:   CC: Waqar Ariza is an 63 year old male who presents for preventive health visit.     Healthy Habits:    Do you get at least three servings of calcium containing foods daily (dairy, green leafy vegetables, etc.)? yes    Amount of exercise or daily activities, outside of work: 7 day(s) per week    Problems taking medications regularly No    Medication side effects: No    Have you had an eye exam in the past two years? yes    Do you see a dentist twice per year? yes    Do you have sleep apnea, excessive snoring or daytime drowsiness?no      Fasting labs      Today's PHQ-2 Score:   PHQ-2 ( 1999 [employer]) 5/26/2017 11/24/2014   Q1: Little interest or pleasure in doing things 0 0   Q2: Feeling down, depressed or hopeless 0 0   PHQ-2 Score 0 0       Abuse: Current or Past(Physical, Sexual or Emotional)- No  Do you feel safe in your environment? Yes    Social History     Tobacco Use     Smoking status: Never Smoker     Smokeless tobacco: Never Used   Substance Use Topics     Alcohol use: Yes     Comment: special occasions only     If you drink alcohol do you typically have >3 drinks per day or >7 drinks per week? No       2  HEARING FREQUENCY TESTED BOTH EARS:Failed  Right Ear/Left Ear      500 Hz: passed/failed: pass  left   failed right    1000 Hz: Passed   2000 Hz: Passed   4000 Hz: Passed  Nancy Stallings MA 5/31/2019                   Last PSA: No results found for: PSA    Reviewed orders with patient. Reviewed health maintenance and updated orders accordingly - Yes  Patient Active Problem List   Diagnosis     Adenomatous polyp of colon     Advanced directives, counseling/discussion     Mitral valve disorder     Past Surgical History:   Procedure Laterality Date     CATARACT IOL, RT/LT  2004, 2009    Cataract IOL RT/LT     retinal detachment  2005       Social History     Tobacco Use     Smoking status: Never Smoker     Smokeless tobacco: Never Used   Substance Use Topics     Alcohol use: Yes      Comment: special occasions only     History reviewed. No pertinent family history.        Reviewed and updated as needed this visit by clinical staff         Reviewed and updated as needed this visit by Provider        Past Medical History:   Diagnosis Date     Adenomatous polyp of colon 2007    q 5 year colonoscopy     Mitral valve disorders(424.0) 11/24/2014     Right fibular fracture 3/2013      Past Surgical History:   Procedure Laterality Date     CATARACT IOL, RT/LT  2004, 2009    Cataract IOL RT/LT     retinal detachment  2005       ROS:  CONSTITUTIONAL: NEGATIVE for fever, chills, change in weight  INTEGUMENTARY/SKIN: NEGATIVE for worrisome rashes, moles or lesions  EYES: NEGATIVE for vision changes or irritation  ENT: NEGATIVE for ear, mouth and throat problems  RESP: NEGATIVE for significant cough or SOB  CV: NEGATIVE for chest pain, palpitations or peripheral edema  GI: NEGATIVE for nausea, abdominal pain, heartburn, or change in bowel habits   male: negative for dysuria, hematuria, decreased urinary stream, erectile dysfunction, urethral discharge  MUSCULOSKELETAL: NEGATIVE for significant arthralgias or myalgia  NEURO: NEGATIVE for weakness, dizziness or paresthesias  PSYCHIATRIC: NEGATIVE for changes in mood or affect    OBJECTIVE:   There were no vitals taken for this visit.  EXAM:  GENERAL: healthy, alert and no distress  EYES: Eyes grossly normal to inspection, PERRL and conjunctivae and sclerae normal  HENT: ear canals and TM's normal, nose and mouth without ulcers or lesions  NECK: no adenopathy, no asymmetry, masses, or scars and thyroid normal to palpation  RESP: lungs clear to auscultation - no rales, rhonchi or wheezes  CV: regular rate and rhythm, normal S1 S2, no S3 or S4, no murmur, click or rub, no peripheral edema and peripheral pulses strong  ABDOMEN: soft, nontender, no hepatosplenomegaly, no masses and bowel sounds normal   (male): normal male genitalia without lesions or urethral  discharge, no hernia  RECTAL: normal sphincter tone, no rectal masses, prostate normal size, smooth, nontender without nodules or masses  MS: no gross musculoskeletal defects noted, no edema  SKIN: no suspicious lesions or rashes  NEURO: Normal strength and tone, mentation intact and speech normal  PSYCH: mentation appears normal, affect normal/bright        ASSESSMENT/PLAN:   Waqar was seen today for physical.    Diagnoses and all orders for this visit:    Passed hearing screening  -     SCREENING TEST, PURE TONE, AIR ONLY    Routine general medical examination at a health care facility    Primary osteoarthritis of both knees    Screening for heart disease  -     Comp. Metabolic Panel (14) ([facility])  -     Lipid Panel ([facility])    Screening for prostate cancer  -     PSA Serum ([facility])    Anemia, unspecified type  -     CBC with Diff/Plt ([facility])        COUNSELING:  Reviewed preventive health counseling, as reflected in patient instructions       Hearing screening       Aspirin Prophylaxsis       Colon cancer screening       Prostate cancer screening    Estimated body mass index is 25.36 kg/m  as calculated from the following:    Height as of 4/25/19: 1.829 m (6').    Weight as of 4/25/19: 84.8 kg (187 lb).         reports that he has never smoked. He has never used smokeless tobacco.      Counseling Resources:  ATP IV Guidelines  Pooled Cohorts Equation Calculator  FRAX Risk Assessment  ICSI Preventive Guidelines  Dietary Guidelines for Americans, 2010  USDA's MyPlate  ASA Prophylaxis  Lung CA Screening    Surinder Catherine MD  [facility] SaultThe 10-year ASCVD risk score (Claire GOLDSMITH Jr., et al., 2013) is: 8.2%    Values used to calculate the score:      Age: 63 years      Sex: Male      Is Non- : No      Diabetic: No      Tobacco smoker: No      Systolic Blood Pressure: 128 mmHg      Is BP treated: No      HDL Cholesterol: 55 mg/dL      Total Cholesterol: 148 mg/dL

## 2019-06-01 LAB
ALBUMIN SERPL-MCNC: 4.8 G/DL (ref 3.6–4.8)
ALBUMIN/GLOB SERPL: 1.5 {RATIO} (ref 1.2–2.2)
ALP SERPL-CCNC: 52 IU/L (ref 39–117)
ALT SERPL-CCNC: 27 IU/L (ref 0–44)
AST SERPL-CCNC: 26 IU/L (ref 0–40)
BILIRUB SERPL-MCNC: 0.9 MG/DL (ref 0–1.2)
BUN SERPL-MCNC: 15 MG/DL (ref 8–27)
BUN/CREATININE RATIO: 14 (ref 10–24)
CALCIUM SERPL-MCNC: 10 MG/DL (ref 8.6–10.2)
CHLORIDE SERPLBLD-SCNC: 101 MMOL/L (ref 96–106)
CHOLEST SERPL-MCNC: 179 MG/DL (ref 100–199)
CREAT SERPL-MCNC: 1.04 MG/DL (ref 0.76–1.27)
EGFR IF AFRICN AM: 88 ML/MIN/1.73
EGFR IF NONAFRICN AM: 76 ML/MIN/1.73
GLOBULIN, TOTAL: 3.2 G/DL (ref 1.5–4.5)
GLUCOSE SERPL-MCNC: 89 MG/DL (ref 65–99)
HDLC SERPL-MCNC: 53 MG/DL
LDL/HDL RATIO: 1.9 RATIO (ref 0–3.6)
LDLC SERPL CALC-MCNC: 103 MG/DL (ref 0–99)
POTASSIUM SERPL-SCNC: 5.1 MMOL/L (ref 3.5–5.2)
PROT SERPL-MCNC: 8 G/DL (ref 6–8.5)
PSA NG/ML: 0.5 NG/ML (ref 0–4)
SODIUM SERPL-SCNC: 141 MMOL/L (ref 134–144)
TOTAL CO2: 28 MMOL/L (ref 20–29)
TRIGL SERPL-MCNC: 115 MG/DL (ref 0–149)
VLDLC SERPL CALC-MCNC: 23 MG/DL (ref 5–40)

## 2019-10-08 PROCEDURE — 90471 IMMUNIZATION ADMIN: CPT | Performed by: FAMILY MEDICINE

## 2019-10-08 PROCEDURE — 90686 IIV4 VACC NO PRSV 0.5 ML IM: CPT | Performed by: FAMILY MEDICINE

## 2019-10-16 ENCOUNTER — OFFICE VISIT (OUTPATIENT)
Dept: FAMILY MEDICINE | Facility: CLINIC | Age: 64
End: 2019-10-16

## 2019-10-16 VITALS
WEIGHT: 191 LBS | OXYGEN SATURATION: 99 % | DIASTOLIC BLOOD PRESSURE: 82 MMHG | BODY MASS INDEX: 26.09 KG/M2 | HEART RATE: 93 BPM | SYSTOLIC BLOOD PRESSURE: 126 MMHG

## 2019-10-16 DIAGNOSIS — R07.89 ATYPICAL CHEST PAIN: Primary | ICD-10-CM

## 2019-10-16 PROCEDURE — 93000 ELECTROCARDIOGRAM COMPLETE: CPT | Mod: 59 | Performed by: FAMILY MEDICINE

## 2019-10-16 PROCEDURE — 99214 OFFICE O/P EST MOD 30 MIN: CPT | Mod: 25 | Performed by: FAMILY MEDICINE

## 2019-10-16 NOTE — PROGRESS NOTES
SUBJECTIVE:  Patient complaining of chest discomfort bilaterally.  SUBJECTIVE:  Waqar Ariza is a 63 year old male who presents to the office with the CC of chest pain.  Patient complains of chest pressure/discomfort.  The pain is characterized as moderate and localized pressure located left chest without radiation   Symptoms began 1 week(s) ago, gradual onset and still present  Pain is associated with stress/anxiety.  Pain is exacerbated by exertion.  Pain is relieved by rest.  Cardiac risk factors: negative for abnormal lipids, DM, HTN, tobacco and negative FH    Hx of MV regurg per hx    Past Medical History:   Diagnosis Date     Adenomatous polyp of colon 2007    q 5 year colonoscopy     Mitral valve disorders(424.0) 11/24/2014     Right fibular fracture 3/2013         Current Outpatient Medications:      Calcium Carbonate-Vitamin D (CALCIUM 600/VITAMIN D PO), Take 1 tablet by mouth daily., Disp: , Rfl:      glucosamine-chondroitin 500-400 MG CAPS per capsule, Take 1 capsule by mouth daily, Disp: , Rfl:      Multiple Vitamins-Minerals (DAILY MULTI PO), Take 1 tablet by mouth daily., Disp: , Rfl:      Omega-3 Fatty Acids (FISH OIL PO), Take 1 tablet by mouth daily., Disp: , Rfl:      UNABLE TO FIND, MEDICATION NAME: protandim  By [employer] advantage for oxidation stress, Disp: , Rfl:     Social History     Tobacco Use     Smoking status: Never Smoker     Smokeless tobacco: Never Used   Substance Use Topics     Alcohol use: Yes     Comment: special occasions only       ROS:CONSTITUTIONAL:NEGATIVE for fever, chills, change in weight  INTEGUMENTARY/SKIN: NEGATIVE for worrisome rashes, moles or lesions    EXAM:  /82 (BP Location: Left arm, Patient Position: Sitting, Cuff Size: Adult Regular)   Pulse 93   Wt 86.6 kg (191 lb)   SpO2 99%   BMI 26.09 kg/m    GENERAL APPEARANCE: healthy, alert and no distress  EYES: EOMI,  PERRL, conjunctiva clear  HENT: ear canals and TM's normal.  Nose and mouth without ulcers,  erythema or lesions  NECK: supple, nontender, no lymphadenopathy  RESP: lungs clear to auscultation - no rales, rhonchi or wheezes  CV: regular rates and rhythm, normal S1 S2, no murmur noted  ABDOMEN:  soft, nontender, no HSM or masses and bowel sounds normal  NEURO: Normal strength and tone, sensory exam grossly normal,  normal speech and mentation  SKIN: no suspicious lesions or rashes     Office EKG demonstrates:  appears normal, NSR,normal axis, normal intervals, no acute ST/T changes c/w ischemia,no LVH by voltage criteria,unchanged from previous tracings    Assessment  / IMPRESSION  1. Atypical chest pain  Trial of otc PPI   Stress eval given age, type of pain.  To ED if worsening.     - EKG 12-lead complete w/read - Clinics  - NM Exercise stress test; Future

## 2019-10-28 ENCOUNTER — HOSPITAL ENCOUNTER (OUTPATIENT)
Dept: CARDIOLOGY | Facility: CLINIC | Age: 64
Discharge: HOME | End: 2019-10-28
Attending: FAMILY MEDICINE | Admitting: FAMILY MEDICINE
Payer: COMMERCIAL

## 2019-10-28 DIAGNOSIS — R07.89 ATYPICAL CHEST PAIN: ICD-10-CM

## 2019-10-28 PROCEDURE — 93018 CV STRESS TEST I&R ONLY: CPT | Performed by: INTERNAL MEDICINE

## 2019-10-28 PROCEDURE — 93016 CV STRESS TEST SUPVJ ONLY: CPT | Performed by: INTERNAL MEDICINE

## 2019-10-28 PROCEDURE — 34300033 ZZH RX 343: Performed by: FAMILY MEDICINE

## 2019-10-28 PROCEDURE — 78452 HT MUSCLE IMAGE SPECT MULT: CPT

## 2019-10-28 PROCEDURE — 78452 HT MUSCLE IMAGE SPECT MULT: CPT | Mod: 26 | Performed by: INTERNAL MEDICINE

## 2019-10-28 PROCEDURE — A9502 TC99M TETROFOSMIN: HCPCS | Performed by: FAMILY MEDICINE

## 2019-10-28 RX ADMIN — TETROFOSMIN 3.28 MCI.: 1.38 INJECTION, POWDER, LYOPHILIZED, FOR SOLUTION INTRAVENOUS at 08:44

## 2019-10-28 RX ADMIN — TETROFOSMIN 9.62 MCI.: 1.38 INJECTION, POWDER, LYOPHILIZED, FOR SOLUTION INTRAVENOUS at 10:15

## 2019-10-29 ENCOUNTER — MYC MEDICAL ADVICE (OUTPATIENT)
Dept: FAMILY MEDICINE | Facility: CLINIC | Age: 64
End: 2019-10-29

## 2019-10-29 DIAGNOSIS — R94.39 ABNORMAL NUCLEAR STRESS TEST: Primary | ICD-10-CM

## 2019-10-29 NOTE — TELEPHONE ENCOUNTER
Edgar Gr MD  P [facility] Triage             He has a positive stress test but the cardiologist isnt sure if has artifact in the result.   Please arrange for him to see cardiology as a consult rather than ordering an angiogram     Impression:  1.  Myocardial perfusion imaging using single isotope technique  demonstrated a small region of suspected ischemia within the inferior  and inferoseptal walls. However, I suspect that this could be  artifactual and would suggest additional evaluation with CT coronary  angiography for confirmation before moving on to more invasive  studies..   2. Gated images demonstrated borderline global hypokinesis.  The left  ventricular systolic function is borderline reduced, with an ejection  fraction of 49%.  3. No previous study available for comparison    Plan: called patient and informed. Scheduled cardiology consult for 11/5/19 with Dr. Arnold at [facility].

## 2019-11-05 ENCOUNTER — OFFICE VISIT (OUTPATIENT)
Dept: CARDIOLOGY | Facility: CLINIC | Age: 64
End: 2019-11-05
Payer: COMMERCIAL

## 2019-11-05 VITALS
BODY MASS INDEX: 25.19 KG/M2 | HEART RATE: 78 BPM | HEIGHT: 72 IN | SYSTOLIC BLOOD PRESSURE: 118 MMHG | WEIGHT: 186 LBS | DIASTOLIC BLOOD PRESSURE: 76 MMHG

## 2019-11-05 DIAGNOSIS — I34.1 MITRAL VALVE PROLAPSE: ICD-10-CM

## 2019-11-05 DIAGNOSIS — R07.2 PRECORDIAL PAIN: Primary | ICD-10-CM

## 2019-11-05 DIAGNOSIS — R94.39 ABNORMAL CARDIOVASCULAR STRESS TEST: ICD-10-CM

## 2019-11-05 DIAGNOSIS — I34.0 NONRHEUMATIC MITRAL VALVE REGURGITATION: ICD-10-CM

## 2019-11-05 PROCEDURE — 99204 OFFICE O/P NEW MOD 45 MIN: CPT | Performed by: INTERNAL MEDICINE

## 2019-11-05 ASSESSMENT — MIFFLIN-ST. JEOR: SCORE: 1672.72

## 2019-11-05 NOTE — PROGRESS NOTES
Service Date: 11/05/2019      PRIMARY CARE PHYSICIAN:  Dr. Surinder Catherine.      HISTORY OF PRESENT ILLNESS:  I had the pleasure of seeing your patient, Waqar Ariza, at [facility] in [place] for evaluation of chest discomfort and an abnormal nuclear stress test.  Waqar Ariza is a pleasant 63-year-old male with a remote history of mitral valve prolapse and mild to moderate mitral regurgitation.  His last echocardiogram was performed in 2007.  In 2002 the patient underwent a stress echocardiogram that was normal but showed his mitral valve prolapse.  He used SBE prophylaxis for multiple years after that.  The patient has had a vague left-sided chest pressure for many months.  This initially occurred with walking and then resolved quickly.  Over the last month it has been more continuous.  Approximately 2 weeks ago the patient developed an episode of precordial chest pressure and indigestion.  This was now increasing with activity.  He tried doing other things such as raking but also had these symptoms.  There was no chest tenderness.  He was not short of breath.  This gradually resolved.  An EKG in his family physician's office was performed on 10/16/2019 and was normal.  The patient then underwent a nuclear stress test on 10/28/2019 where the patient was able to exercise 7 minutes 16 seconds to a maximal heart rate of 160 beats per minute.  There was a small region of suspected ischemia within the inferior and inferoseptal walls.  The reader suspected that this was artifactual and suggested a CT coronary angiogram rather than moving to an invasive study.  There was borderline global hypokinesis and ejection fraction of 49%.  The patient has never had a myocardial infarction.  He denies a family history of premature atherosclerosis.  He is a nonsmoker.  He denies diabetes, hypertension or hyperlipidemia.  I reviewed his most recent lipids from 05/31/2019 showing total cholesterol 179, HDL 53,   and triglycerides 115.  He is able to walk 15-20,000 steps per day generally free of symptoms.      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      The patient's review of systems is otherwise negative with a 12-point review of systems performed.      PHYSICAL EXAMINATION:  As listed below.      ASSESSMENT:   1.  Waqar Ariza is a pleasant 63-year-old male with atypical chest pain.  His nuclear stress test shows inferior wall ischemia.  This may be artifact.  We will perform a CT coronary angiogram before we move onto an invasive angiogram.  If this is normal or shows minimal disease risk factor modification would be necessary.  If this shows significant disease invasive coronary angiogram may be necessary.  I cannot rule out Joe's syndrome with his mitral valve prolapse.   2.  Mitral valve prolapse that was mild to moderate in the past.  We will obtain an echocardiogram to assess the degree of mitral regurgitation.  This was not easily auscultated during my exam and I suggested this is certainly not severe.      PLAN:   1.  CT coronary angiogram.   2.  Echocardiogram.   3.  Followup will be determined based on these tests.   4.  To date no risk factor modification is necessary.  The patient is not on a statin.  He was previously on aspirin and this was discontinued as per recent studies.      It is my pleasure to assist in the care of Waqar Ariza.  All his questions were answered to his satisfaction.      Baldo Hays MD        cc:   Surinder Catherine MD    [facility]    [address] [place]  [address]         BALDO HAYS MD, [facility]             D: 2019   T: 2019   MT: [id number]      Name:     WAQAR ARIZA   MRN:      [id number]        Account:      [id number]   :      1955           Service Date: 2019      Document: [id number]

## 2019-11-05 NOTE — LETTER
11/5/2019    Surinder Catherine MD  [address]  [facility] [address]    RE: Waqar Ariza       Dear Colleague,    I had the pleasure of seeing Waqar Ariza in the [facility].    HPI and Plan:   See dictation:[id number]    Orders Placed This Encounter   Procedures     CTA Angiogram coronary artery     Echocardiogram Complete       No orders of the defined types were placed in this encounter.      There are no discontinued medications.      Encounter Diagnoses   Name Primary?     Precordial pain Yes     Abnormal cardiovascular stress test      Mitral valve prolapse      Nonrheumatic mitral valve regurgitation        CURRENT MEDICATIONS:  Current Outpatient Medications   Medication Sig Dispense Refill     Calcium Carbonate-Vitamin D (CALCIUM 600/VITAMIN D PO) Take 1 tablet by mouth daily.       glucosamine-chondroitin 500-400 MG CAPS per capsule Take 1 capsule by mouth daily       Multiple Vitamins-Minerals (DAILY MULTI PO) Take 1 tablet by mouth daily.       Omega-3 Fatty Acids (FISH OIL PO) Take 1 tablet by mouth daily.       UNABLE TO FIND MEDICATION NAME: Protandim  By life advantage for Oxidation stress         ALLERGIES   No Known Allergies    PAST MEDICAL HISTORY:  Past Medical History:   Diagnosis Date     Abnormal stress test 10/28/2019     Adenomatous polyp of colon 2007    q 5 year colonoscopy     Chest pain      Mitral valve disorder 11/24/2014     Problem list name updated by automated process. Provider to review     Right fibular fracture 3/2013       PAST SURGICAL HISTORY:  Past Surgical History:   Procedure Laterality Date     CATARACT IOL, RT/LT  2004, 2009    Cataract IOL RT/LT     retinal detachment  2005       FAMILY HISTORY:  Family History   Problem Relation Age of Onset     Hypertension Mother         older in 70s       SOCIAL HISTORY:  Social History     Socioeconomic History     Marital status: Single     Spouse name: None     Number of children: None  "    Years of education: None     Highest education level: None   Occupational History     Occupation: Teacher      Employer: [employer]     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   Social Needs     Financial resource strain: None     Food insecurity:     Worry: None     Inability: None     Transportation needs:     Medical: None     Non-medical: None   Tobacco Use     Smoking status: Never Smoker     Smokeless tobacco: Never Used   Substance and Sexual Activity     Alcohol use: Yes     Comment: special occasions only     Drug use: No     Sexual activity: Not Currently   Lifestyle     Physical activity:     Days per week: None     Minutes per session: None     Stress: None   Relationships     Social connections:     Talks on phone: None     Gets together: None     Attends Congregation service: None     Active member of club or organization: None     Attends meetings of clubs or organizations: None     Relationship status: None     Intimate partner violence:     Fear of current or ex partner: None     Emotionally abused: None     Physically abused: None     Forced sexual activity: None   Other Topics Concern     Parent/sibling w/ CABG, MI or angioplasty before 65F 55M? No   Social History Narrative     None       Review of Systems:  Skin:  Negative       Eyes:  Positive for glasses    ENT:  Negative      Respiratory:  Positive for shortness of breath('more shallow')     Cardiovascular:    Positive for;heaviness;lightheadedness(discomfort pressure left side of heart with activity.) (light headed in April. Hgb was low.)  Gastroenterology: Positive for reflux    Genitourinary:  not assessed      Musculoskeletal:  Negative      Neurologic:  Negative      Psychiatric:  Negative      Heme/Lymph/Imm:  Negative      Endocrine:  Negative        Physical Exam:  Vitals: /76   Pulse 78   Ht 1.822 m (5' 11.75\")   Wt 84.4 kg (186 lb)   BMI 25.40 kg/m       Constitutional:  cooperative, alert and oriented, well developed, well " nourished, in no acute distress        Skin:  warm and dry to the touch, no apparent skin lesions or masses noted          Head:  normocephalic, no masses or lesions        Eyes:  pupils equal and round, conjunctivae and lids unremarkable, sclera white, no xanthalasma, EOMS intact, no nystagmus        Lymph:      ENT:  no pallor or cyanosis, dentition good        Neck:  carotid pulses are full and equal bilaterally, JVP normal, no carotid bruit        Respiratory:  normal breath sounds, clear to auscultation, normal A-P diameter, normal symmetry, normal respiratory excursion, no use of accessory muscles         Cardiac: regular rhythm, normal S1/S2, no S3 or S4, apical impulse not displaced, no murmurs, gallops or rubs                pulses full and equal, no bruits auscultated                                        GI:  abdomen soft, non-tender, BS normoactive, no mass, no HSM, no bruits        Extremities and Muscular Skeletal:  no deformities, clubbing, cyanosis, erythema observed;no edema              Neurological:  no gross motor deficits;affect appropriate        Psych:  Alert and Oriented x 3        CC  Surinder Catherine MD  [address] [place] [address]                Thank you for allowing me to participate in the care of your patient.      Sincerely,     Son Arnold MD     [facility]    cc:   Surinder Catherine MD  [address] [place] [address]

## 2019-11-05 NOTE — PROGRESS NOTES
HPI and Plan:   See dictation:[id number]    Orders Placed This Encounter   Procedures     CTA Angiogram coronary artery     Echocardiogram Complete       No orders of the defined types were placed in this encounter.      There are no discontinued medications.      Encounter Diagnoses   Name Primary?     Precordial pain Yes     Abnormal cardiovascular stress test      Mitral valve prolapse      Nonrheumatic mitral valve regurgitation        CURRENT MEDICATIONS:  Current Outpatient Medications   Medication Sig Dispense Refill     Calcium Carbonate-Vitamin D (CALCIUM 600/VITAMIN D PO) Take 1 tablet by mouth daily.       glucosamine-chondroitin 500-400 MG CAPS per capsule Take 1 capsule by mouth daily       Multiple Vitamins-Minerals (DAILY MULTI PO) Take 1 tablet by mouth daily.       Omega-3 Fatty Acids (FISH OIL PO) Take 1 tablet by mouth daily.       UNABLE TO FIND MEDICATION NAME: Protandim  By life advantage for Oxidation stress         ALLERGIES   No Known Allergies    PAST MEDICAL HISTORY:  Past Medical History:   Diagnosis Date     Abnormal stress test 10/28/2019     Adenomatous polyp of colon 2007    q 5 year colonoscopy     Chest pain      Mitral valve disorder 11/24/2014     Problem list name updated by automated process. Provider to review     Right fibular fracture 3/2013       PAST SURGICAL HISTORY:  Past Surgical History:   Procedure Laterality Date     CATARACT IOL, RT/LT  2004, 2009    Cataract IOL RT/LT     retinal detachment  2005       FAMILY HISTORY:  Family History   Problem Relation Age of Onset     Hypertension Mother         older in 70s       SOCIAL HISTORY:  Social History     Socioeconomic History     Marital status: Single     Spouse name: None     Number of children: None     Years of education: None     Highest education level: None   Occupational History     Occupation: Teacher      Employer: [employer]     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   Social Needs     Financial resource strain:  "None     Food insecurity:     Worry: None     Inability: None     Transportation needs:     Medical: None     Non-medical: None   Tobacco Use     Smoking status: Never Smoker     Smokeless tobacco: Never Used   Substance and Sexual Activity     Alcohol use: Yes     Comment: special occasions only     Drug use: No     Sexual activity: Not Currently   Lifestyle     Physical activity:     Days per week: None     Minutes per session: None     Stress: None   Relationships     Social connections:     Talks on phone: None     Gets together: None     Attends Nondenominational service: None     Active member of club or organization: None     Attends meetings of clubs or organizations: None     Relationship status: None     Intimate partner violence:     Fear of current or ex partner: None     Emotionally abused: None     Physically abused: None     Forced sexual activity: None   Other Topics Concern     Parent/sibling w/ CABG, MI or angioplasty before 65F 55M? No   Social History Narrative     None       Review of Systems:  Skin:  Negative       Eyes:  Positive for glasses    ENT:  Negative      Respiratory:  Positive for shortness of breath('more shallow')     Cardiovascular:    Positive for;heaviness;lightheadedness(discomfort pressure left side of heart with activity.) (light headed in April. Hgb was low.)  Gastroenterology: Positive for reflux    Genitourinary:  not assessed      Musculoskeletal:  Negative      Neurologic:  Negative      Psychiatric:  Negative      Heme/Lymph/Imm:  Negative      Endocrine:  Negative        Physical Exam:  Vitals: /76   Pulse 78   Ht 1.822 m (5' 11.75\")   Wt 84.4 kg (186 lb)   BMI 25.40 kg/m      Constitutional:  cooperative, alert and oriented, well developed, well nourished, in no acute distress        Skin:  warm and dry to the touch, no apparent skin lesions or masses noted          Head:  normocephalic, no masses or lesions        Eyes:  pupils equal and round, conjunctivae and " lids unremarkable, sclera white, no xanthalasma, EOMS intact, no nystagmus        Lymph:      ENT:  no pallor or cyanosis, dentition good        Neck:  carotid pulses are full and equal bilaterally, JVP normal, no carotid bruit        Respiratory:  normal breath sounds, clear to auscultation, normal A-P diameter, normal symmetry, normal respiratory excursion, no use of accessory muscles         Cardiac: regular rhythm, normal S1/S2, no S3 or S4, apical impulse not displaced, no murmurs, gallops or rubs                pulses full and equal, no bruits auscultated                                        GI:  abdomen soft, non-tender, BS normoactive, no mass, no HSM, no bruits        Extremities and Muscular Skeletal:  no deformities, clubbing, cyanosis, erythema observed;no edema              Neurological:  no gross motor deficits;affect appropriate        Psych:  Alert and Oriented x 3        CC  Surinder Catherine MD  [address] [place] [address]

## 2019-11-05 NOTE — LETTER
11/5/2019      Surinder Catherine MD  [address]  [facility] [address]      RE: Waqar Ariza       Dear Colleague,    I had the pleasure of seeing Waqar Ariza in the [facility].    Service Date: 11/05/2019      PRIMARY CARE PHYSICIAN:  Dr. Surinder Catherine.      HISTORY OF PRESENT ILLNESS:  I had the pleasure of seeing your patient, Waqar Ariza, at [facility] in [place] for evaluation of chest discomfort and an abnormal nuclear stress test.  Waqar Ariza is a pleasant 63-year-old male with a remote history of mitral valve prolapse and mild to moderate mitral regurgitation.  His last echocardiogram was performed in 2007.  In 2002 the patient underwent a stress echocardiogram that was normal but showed his mitral valve prolapse.  He used SBE prophylaxis for multiple years after that.  The patient has had a vague left-sided chest pressure for many months.  This initially occurred with walking and then resolved quickly.  Over the last month it has been more continuous.  Approximately 2 weeks ago the patient developed an episode of precordial chest pressure and indigestion.  This was now increasing with activity.  He tried doing other things such as raking but also had these symptoms.  There was no chest tenderness.  He was not short of breath.  This gradually resolved.  An EKG in his family physician's office was performed on 10/16/2019 and was normal.  The patient then underwent a nuclear stress test on 10/28/2019 where the patient was able to exercise 7 minutes 16 seconds to a maximal heart rate of 160 beats per minute.  There was a small region of suspected ischemia within the inferior and inferoseptal walls.  The reader suspected that this was artifactual and suggested a CT coronary angiogram rather than moving to an invasive study.  There was borderline global hypokinesis and ejection fraction of 49%.  The patient has never had a myocardial infarction.   He denies a family history of premature atherosclerosis.  He is a nonsmoker.  He denies diabetes, hypertension or hyperlipidemia.  I reviewed his most recent lipids from 05/31/2019 showing total cholesterol 179, HDL 53,  and triglycerides 115.  He is able to walk 15-20,000 steps per day generally free of symptoms.      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      The patient's review of systems is otherwise negative with a 12-point review of systems performed.      PHYSICAL EXAMINATION:  As listed below.      ASSESSMENT:   1.  Waqar Ariza is a pleasant 63-year-old male with atypical chest pain.  His nuclear stress test shows inferior wall ischemia.  This may be artifact.  We will perform a CT coronary angiogram before we move onto an invasive angiogram.  If this is normal or shows minimal disease risk factor modification would be necessary.  If this shows significant disease invasive coronary angiogram may be necessary.  I cannot rule out Joe's syndrome with his mitral valve prolapse.   2.  Mitral valve prolapse that was mild to moderate in the past.  We will obtain an echocardiogram to assess the degree of mitral regurgitation.  This was not easily auscultated during my exam and I suggested this is certainly not severe.      PLAN:   1.  CT coronary angiogram.   2.  Echocardiogram.   3.  Followup will be determined based on these tests.   4.  To date no risk factor modification is necessary.  The patient is not on a statin.  He was previously on aspirin and this was discontinued as per recent studies.      It is my pleasure to assist in the care of Waqar Ariza.  All his questions were answered to his satisfaction.      Baldo Hays MD        cc:   Surinder Catherine MD    [facility] [address] [place]  [address]         BALDO HAYS MD, FACC             D: 11/05/2019   T: 11/05/2019   MT: [id number]       Name:     ROMEL TOM   MRN:      [id number]        Account:      [id number]   :      1955           Service Date: 2019      Document: [id number]         Outpatient Encounter Medications as of 2019   Medication Sig Dispense Refill     Calcium Carbonate-Vitamin D (CALCIUM 600/VITAMIN D PO) Take 1 tablet by mouth daily.       glucosamine-chondroitin 500-400 MG CAPS per capsule Take 1 capsule by mouth daily       Multiple Vitamins-Minerals (DAILY MULTI PO) Take 1 tablet by mouth daily.       Omega-3 Fatty Acids (FISH OIL PO) Take 1 tablet by mouth daily.       UNABLE TO FIND MEDICATION NAME: Protandim  By life advantage for Oxidation stress       No facility-administered encounter medications on file as of 2019.        Again, thank you for allowing me to participate in the care of your patient.      Sincerely,    Son Arnold MD     [facility]

## 2019-11-20 ENCOUNTER — HOSPITAL ENCOUNTER (OUTPATIENT)
Dept: CARDIOLOGY | Facility: CLINIC | Age: 64
Discharge: HOME | End: 2019-11-20
Attending: INTERNAL MEDICINE | Admitting: INTERNAL MEDICINE
Payer: COMMERCIAL

## 2019-11-20 VITALS — SYSTOLIC BLOOD PRESSURE: 112 MMHG | DIASTOLIC BLOOD PRESSURE: 72 MMHG

## 2019-11-20 DIAGNOSIS — I34.0 NONRHEUMATIC MITRAL VALVE REGURGITATION: ICD-10-CM

## 2019-11-20 DIAGNOSIS — R94.39 ABNORMAL CARDIOVASCULAR STRESS TEST: ICD-10-CM

## 2019-11-20 DIAGNOSIS — I34.1 MITRAL VALVE PROLAPSE: ICD-10-CM

## 2019-11-20 DIAGNOSIS — R07.2 PRECORDIAL PAIN: ICD-10-CM

## 2019-11-20 PROCEDURE — 75574 CT ANGIO HRT W/3D IMAGE: CPT | Mod: 26 | Performed by: INTERNAL MEDICINE

## 2019-11-20 PROCEDURE — 25000132 ZZH RX MED GY IP 250 OP 250 PS 637: Performed by: INTERNAL MEDICINE

## 2019-11-20 PROCEDURE — 93306 TTE W/DOPPLER COMPLETE: CPT | Mod: 26 | Performed by: INTERNAL MEDICINE

## 2019-11-20 PROCEDURE — 25500064 ZZH RX 255 OP 636: Performed by: INTERNAL MEDICINE

## 2019-11-20 PROCEDURE — 25000128 H RX IP 250 OP 636: Performed by: INTERNAL MEDICINE

## 2019-11-20 PROCEDURE — 25000125 ZZHC RX 250: Performed by: INTERNAL MEDICINE

## 2019-11-20 PROCEDURE — 40000264 ECHOCARDIOGRAM COMPLETE

## 2019-11-20 PROCEDURE — 75574 CT ANGIO HRT W/3D IMAGE: CPT

## 2019-11-20 RX ORDER — METOPROLOL TARTRATE 1 MG/ML
5-15 INJECTION, SOLUTION INTRAVENOUS
Status: DISCONTINUED | OUTPATIENT
Start: 2019-11-20 | End: 2019-11-21 | Stop reason: HOSPADM

## 2019-11-20 RX ORDER — DIPHENHYDRAMINE HCL 25 MG
25 CAPSULE ORAL
Status: DISCONTINUED | OUTPATIENT
Start: 2019-11-20 | End: 2019-11-21 | Stop reason: HOSPADM

## 2019-11-20 RX ORDER — NITROGLYCERIN 0.4 MG/1
0.4 TABLET SUBLINGUAL
Status: DISCONTINUED | OUTPATIENT
Start: 2019-11-20 | End: 2019-11-21 | Stop reason: HOSPADM

## 2019-11-20 RX ORDER — IOPAMIDOL 755 MG/ML
500 INJECTION, SOLUTION INTRAVASCULAR ONCE
Status: COMPLETED | OUTPATIENT
Start: 2019-11-20 | End: 2019-11-20

## 2019-11-20 RX ORDER — METHYLPREDNISOLONE SODIUM SUCCINATE 125 MG/2ML
125 INJECTION, POWDER, LYOPHILIZED, FOR SOLUTION INTRAMUSCULAR; INTRAVENOUS
Status: DISCONTINUED | OUTPATIENT
Start: 2019-11-20 | End: 2019-11-21 | Stop reason: HOSPADM

## 2019-11-20 RX ORDER — DIPHENHYDRAMINE HYDROCHLORIDE 50 MG/ML
25-50 INJECTION INTRAMUSCULAR; INTRAVENOUS
Status: DISCONTINUED | OUTPATIENT
Start: 2019-11-20 | End: 2019-11-21 | Stop reason: HOSPADM

## 2019-11-20 RX ORDER — ONDANSETRON 2 MG/ML
4 INJECTION INTRAMUSCULAR; INTRAVENOUS
Status: DISCONTINUED | OUTPATIENT
Start: 2019-11-20 | End: 2019-11-21 | Stop reason: HOSPADM

## 2019-11-20 RX ORDER — ACYCLOVIR 200 MG/1
0-1 CAPSULE ORAL
Status: DISCONTINUED | OUTPATIENT
Start: 2019-11-20 | End: 2019-11-21 | Stop reason: HOSPADM

## 2019-11-20 RX ORDER — METOPROLOL TARTRATE 50 MG
50-100 TABLET ORAL
Status: DISCONTINUED | OUTPATIENT
Start: 2019-11-20 | End: 2019-11-21 | Stop reason: HOSPADM

## 2019-11-20 RX ADMIN — IOPAMIDOL 125 ML: 755 INJECTION, SOLUTION INTRAVENOUS at 09:26

## 2019-11-20 RX ADMIN — HUMAN ALBUMIN MICROSPHERES AND PERFLUTREN 2 ML: 10; .22 INJECTION, SOLUTION INTRAVENOUS at 08:29

## 2019-11-20 RX ADMIN — NITROGLYCERIN 0.4 MG: 0.4 TABLET SUBLINGUAL at 08:50

## 2019-11-20 RX ADMIN — METOPROLOL TARTRATE 5 MG: 5 INJECTION INTRAVENOUS at 08:55

## 2019-11-21 ENCOUNTER — TELEPHONE (OUTPATIENT)
Dept: CARDIOLOGY | Facility: CLINIC | Age: 64
End: 2019-11-21

## 2019-11-21 NOTE — TELEPHONE ENCOUNTER
RN called patient and reviewed with him all of his test results and Dr. Arnold's recommendations. Patient verbalized understanding and is in agreement with plan to f/u with Dr. Catherine his PMD. Patient will call our office with any further questions/concerns.       ----- Message from Son Arnold MD sent at 11/21/2019  3:51 PM CST -----  No RWMA.  Mild bilateral mitral valve leaflet prolapse with trace mitral regurgitation.  Only treatment needed is statin to LDL<70.  This can be done with his PMD.  Son Arnold MD, [facility]  November 21, 2019 3:50 PM      CT coronary   Mild CAD with low Agatston score.  Should be treated with statin to LDL <70.  No ASA for now.  Nuclear stress test likely artifactual inferior changes.  F/U with me prn.  Son Arnold MD, [facility]   November 21, 2019 3:56 PM     Son Arnold MD  [facility] Heart Team 4             Normal soft tissue CT scan.  Son Arnold MD, [facility]   November 21, 2019 3:53 PM        11/5/19 OV Dr. Arnold  ASSESSMENT:   1.  Waqar Ariza is a pleasant 63-year-old male with atypical chest pain.  His nuclear stress test shows inferior wall ischemia.  This may be artifact.  We will perform a CT coronary angiogram before we move onto an invasive angiogram.  If this is normal or shows minimal disease risk factor modification would be necessary.  If this shows significant disease invasive coronary angiogram may be necessary.  I cannot rule out Joe's syndrome with his mitral valve prolapse.   2.  Mitral valve prolapse that was mild to moderate in the past.  We will obtain an echocardiogram to assess the degree of mitral regurgitation.  This was not easily auscultated during my exam and I suggested this is certainly not severe.      PLAN:   1.  CT coronary angiogram.   2.  Echocardiogram.   3.  Followup will be determined based on these tests.   4.  To date no risk factor modification is necessary.  The patient is not on a statin.  He was  previously on aspirin and this was discontinued as per recent studies.      It is my pleasure to assist in the care of Waqar Ariza.  All his questions were answered to his satisfaction.      Son Arnold MD

## 2019-11-22 ENCOUNTER — MYC MEDICAL ADVICE (OUTPATIENT)
Dept: FAMILY MEDICINE | Facility: CLINIC | Age: 64
End: 2019-11-22

## 2019-11-22 ENCOUNTER — MYC MEDICAL ADVICE (OUTPATIENT)
Dept: CARDIOLOGY | Facility: CLINIC | Age: 64
End: 2019-11-22

## 2019-11-22 DIAGNOSIS — I25.10 CAD (CORONARY ARTERY DISEASE): Primary | ICD-10-CM

## 2019-11-22 RX ORDER — ROSUVASTATIN CALCIUM 20 MG/1
20 TABLET, COATED ORAL DAILY
Qty: 90 TABLET | Refills: 1 | Status: SHIPPED | OUTPATIENT
Start: 2019-11-22 | End: 2020-01-07

## 2019-11-22 NOTE — TELEPHONE ENCOUNTER
"[employer] message received. RN responded to patient via [employer].            Good Morning Dr. Arnold     I very much appreciated getting the call from your office with my test results from Wednesday.  [facility] sent the results to me as well and I was confused on what it means to be in the \"35 percentile\" on the calcium test.  Is that considered serious?     I am hoping to follow up with Dr. Catherine as well.     Thank you for your time.  I hope you enjoy a wonderful Thanksgiving with your family.     Angel Ariza       "

## 2019-11-22 NOTE — TELEPHONE ENCOUNTER
Per Dr. Catherine prescription for Crestor sent to pharmacy. Patient scheduled for labs 1/6/20 and OV with Dr. Catherine 1/7/19. Malgorzata Zhao

## 2019-11-26 ENCOUNTER — OFFICE VISIT (OUTPATIENT)
Dept: FAMILY MEDICINE | Facility: CLINIC | Age: 64
End: 2019-11-26

## 2019-11-26 VITALS
HEIGHT: 72 IN | HEART RATE: 80 BPM | SYSTOLIC BLOOD PRESSURE: 122 MMHG | RESPIRATION RATE: 16 BRPM | BODY MASS INDEX: 25.06 KG/M2 | OXYGEN SATURATION: 98 % | WEIGHT: 185 LBS | DIASTOLIC BLOOD PRESSURE: 82 MMHG

## 2019-11-26 DIAGNOSIS — I25.10 CORONARY ARTERY DISEASE INVOLVING NATIVE HEART WITHOUT ANGINA PECTORIS, UNSPECIFIED VESSEL OR LESION TYPE: Primary | ICD-10-CM

## 2019-11-26 PROCEDURE — 99214 OFFICE O/P EST MOD 30 MIN: CPT | Performed by: FAMILY MEDICINE

## 2019-11-26 ASSESSMENT — MIFFLIN-ST. JEOR: SCORE: 1672.15

## 2019-11-29 NOTE — PROGRESS NOTES
Angel is here in follow up for mild CAD and recent cardiac evaluation.  He was having chest pain that necessitated a stress test that was subsequently positive.   He then underwent CT angiography which only showed mild CAD on the R coronary circulation of a R dominant coronary vasculature.    Risk modification was discussed with him and he was started on 20 mg of Crestor.   He was reluctant to start this medication but has done so recently without significant side affects.    ROS otherwise negative including sleep, neuro, CV, skin or GI     /82   Pulse 80   Resp 16   Ht 1.829 m (6')   Wt 83.9 kg (185 lb)   SpO2 98%   BMI 25.09 kg/m      GENERAL: healthy, alert and no distress  EYES: Eyes grossly normal to inspection, PERRL and conjunctivae and sclerae normal  HENT: ear canals and TM's normal, nose and mouth without ulcers or lesions  NECK: no adenopathy, no asymmetry, masses, or scars and thyroid normal to palpation  RESP: lungs clear to auscultation - no rales, rhonchi or wheezes  CV: regular rate and rhythm, normal S1 S2, no S3 or S4, no murmur, click or rub, no peripheral edema and peripheral pulses strong  MS: no gross musculoskeletal defects noted, no edema  SKIN: no suspicious lesions or rashes  NEURO: Normal strength and tone, mentation intact and speech normal  PSYCH: mentation appears normal, affect normal/bright      ASSESSMENT:  1. Coronary artery disease involving native heart without angina pectoris, unspecified vessel or lesion type  Pulled and reviewed the patient's calcium score, stress test and CT angiogram    Patient has very mild CAD and statin, aspirin and BP control were discussed in depth    cw statin and arrange for liver panel upcoming with lipid repeat.     - Comp. Metabolic Panel (14) ([facility]); Future        Greater than 25 minutes spent with patient discussing risks and benefits and management with possible outcomes regarding this issue with greater than 50% in counseling for  medical decision making and coordination of care.

## 2019-12-13 ENCOUNTER — OFFICE VISIT (OUTPATIENT)
Dept: FAMILY MEDICINE | Facility: CLINIC | Age: 64
End: 2019-12-13
Payer: COMMERCIAL

## 2019-12-13 VITALS
OXYGEN SATURATION: 98 % | SYSTOLIC BLOOD PRESSURE: 111 MMHG | HEIGHT: 72 IN | TEMPERATURE: 98.7 F | WEIGHT: 190.8 LBS | HEART RATE: 79 BPM | DIASTOLIC BLOOD PRESSURE: 75 MMHG | BODY MASS INDEX: 25.84 KG/M2

## 2019-12-13 DIAGNOSIS — J02.9 SORE THROAT: ICD-10-CM

## 2019-12-13 DIAGNOSIS — K21.9 GASTROESOPHAGEAL REFLUX DISEASE WITHOUT ESOPHAGITIS: Primary | ICD-10-CM

## 2019-12-13 LAB
DEPRECATED S PYO AG THROAT QL EIA: NORMAL
SPECIMEN SOURCE: NORMAL

## 2019-12-13 PROCEDURE — 87880 STREP A ASSAY W/OPTIC: CPT | Performed by: NURSE PRACTITIONER

## 2019-12-13 PROCEDURE — 87081 CULTURE SCREEN ONLY: CPT | Performed by: NURSE PRACTITIONER

## 2019-12-13 PROCEDURE — 99214 OFFICE O/P EST MOD 30 MIN: CPT | Performed by: NURSE PRACTITIONER

## 2019-12-13 ASSESSMENT — ENCOUNTER SYMPTOMS
VOMITING: 0
PALPITATIONS: 0
NAUSEA: 0
HEADACHES: 1
FEVER: 0
NUMBNESS: 0
BACK PAIN: 0
SHORTNESS OF BREATH: 0
LIGHT-HEADEDNESS: 0
COUGH: 0
WEAKNESS: 0
SORE THROAT: 1
DIARRHEA: 0
DIZZINESS: 0
RHINORRHEA: 0

## 2019-12-13 ASSESSMENT — MIFFLIN-ST. JEOR: SCORE: 1693.46

## 2019-12-14 LAB
BACTERIA SPEC CULT: NORMAL
SPECIMEN SOURCE: NORMAL

## 2019-12-14 NOTE — PROGRESS NOTES
SUBJECTIVE:   Waqar Ariza is a 64 year old male presenting with a chief complaint of   Chief Complaint   Patient presents with     Pharyngitis     couple of days        He is an established patient of [facility].     Patient presents today with complaints of pharyngitis that has been intermittent for the last three months. States initially noted in October when was having some heart testing done and was thought to be related to acid reflux. Was started on omeprazole. He states omeprazole did help with his symptoms, however, he stopped taking once symptoms improved because he had heard it wasn't good to take omeprazole long term if not needed. He states he has started taking more medications regarding his cardiac workup and findings. Seems to be worse in the mornings. 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 Has been trying to avoid certain foods that he knows are triggers. Has also tried taking honey which seems to help with the symptoms.  does have a follow up appointment with his PCP in January, but thought he would walk in today to rule out other potential causes besides reflux.     Review of Systems   Constitutional: Negative for fever.   HENT: Positive for sore throat. Negative for congestion, ear pain and rhinorrhea.    Eyes: Negative for visual disturbance.   Respiratory: Negative for cough and shortness of breath.    Cardiovascular: Negative for chest pain and palpitations.   Gastrointestinal: Negative for diarrhea, nausea and vomiting.   Musculoskeletal: Negative for back pain.   Skin: Negative for rash.   Neurological: Positive for headaches. Negative for dizziness, weakness, light-headedness and numbness.       Past Medical History:   Diagnosis Date     Abnormal stress test 10/28/2019     Adenomatous polyp of colon 2007    q 5 year colonoscopy     Chest pain      Mitral valve disorder 11/24/2014     Problem list name updated by automated process. Provider to review      Right fibular fracture 3/2013     Family History   Problem Relation Age of Onset     Hypertension Mother         older in 70s     Current Outpatient Medications   Medication Sig Dispense Refill     Calcium Carbonate-Vitamin D (CALCIUM 600/VITAMIN D PO) Take 1 tablet by mouth daily.       glucosamine-chondroitin 500-400 MG CAPS per capsule Take 1 capsule by mouth daily       Multiple Vitamins-Minerals (DAILY MULTI PO) Take 1 tablet by mouth daily.       Omega-3 Fatty Acids (FISH OIL PO) Take 1 tablet by mouth daily.       rosuvastatin (CRESTOR) 20 MG tablet Take 1 tablet (20 mg) by mouth daily (Patient not taking: Reported on 11/26/2019) 90 tablet 1     UNABLE TO FIND MEDICATION NAME: Protandim  By [employer] for Oxidation stress       Social History     Tobacco Use     Smoking status: Never Smoker     Smokeless tobacco: Never Used   Substance Use Topics     Alcohol use: Yes     Comment: special occasions only       OBJECTIVE  /75   Pulse 79   Temp 98.7  F (37.1  C) (Oral)   Ht 1.829 m (6')   Wt 86.5 kg (190 lb 12.8 oz)   SpO2 98%   BMI 25.88 kg/m      Physical Exam  Vitals signs and nursing note reviewed.   Constitutional:       Appearance: Normal appearance. He is well-developed and well-groomed.   HENT:      Head: Normocephalic and atraumatic.      Right Ear: Tympanic membrane, ear canal and external ear normal.      Left Ear: Tympanic membrane, ear canal and external ear normal.      Nose: Nose normal.      Mouth/Throat:      Mouth: Mucous membranes are moist.      Pharynx: Oropharynx is clear.   Eyes:      Extraocular Movements: Extraocular movements intact.      Conjunctiva/sclera: Conjunctivae normal.      Pupils: Pupils are equal, round, and reactive to light.   Neck:      Musculoskeletal: Normal range of motion and neck supple.   Cardiovascular:      Rate and Rhythm: Normal rate and regular rhythm.      Heart sounds: Normal heart sounds.   Pulmonary:      Effort: Pulmonary effort is normal.       Breath sounds: Normal breath sounds and air entry.   Lymphadenopathy:      Head:      Right side of head: No submandibular or tonsillar adenopathy.      Left side of head: No submandibular or tonsillar adenopathy.      Cervical: No cervical adenopathy.   Skin:     General: Skin is warm and dry.      Capillary Refill: Capillary refill takes less than 2 seconds.   Neurological:      General: No focal deficit present.      Mental Status: He is alert and oriented to person, place, and time.      GCS: GCS eye subscore is 4. GCS verbal subscore is 5. GCS motor subscore is 6.   Psychiatric:         Behavior: Behavior is cooperative.         Labs:  Results for orders placed or performed in visit on 12/13/19 (from the past 24 hour(s))   Rapid strep screen   Result Value Ref Range    Specimen Description Throat     Rapid Strep A Screen       NEGATIVE: No Group A streptococcal antigen detected by immunoassay, await culture report.       ASSESSMENT:      ICD-10-CM    1. Gastroesophageal reflux disease without esophagitis K21.9    2. Sore throat J02.9 Beta strep group A culture     Rapid strep screen        Medical Decision Making:    Differential Diagnosis:  Sore throat: strep throat, viral pharyngitis, GERD, irritation from medications    Serious Comorbid Conditions:  Adult:  None    PLAN:  Discussed with patient that rapid strep was negative. Will do confirmatory testing. Educated that based on history and exam do favor GERD as the cause of his symptoms. Educated to start taking omeprazole daily, can try taking at night to see if will help with symptoms given worse in the morning. Educated that can take omeprazole longer than the 14 days. Discussed that symptoms may take a few days to improved when starting the omeprazole. Educated that stress can also make reflux worse. Discussed additional symptomatic treatment recommendations and things to do to help improve symptoms. Follow up with PCP as scheduled in January, follow  up sooner if symptoms do not improve or worsen. Education was added to AVS. Patient was agreeable to plan and verbalized understanding.     Followup:    If not improving in 2 weeks or if condition worsens, follow up with your Primary Care Provider    Patient Instructions     Omeprazole daily   Continue with honey to help sooth the throat  Push Fluids  Plenty of rest  Tylenol and ibuprofen to help with pain or fever  Salt water gargles, anesthetic throat spray, or lozenges to help with sore throat.     Patient Education     Tips to Control Acid Reflux    To control acid reflux, you ll need to make some basic diet and lifestyle changes. The simple steps outlined below may be all you ll need to ease discomfort.  Watch what you eat    Avoid fatty foods and spicy foods.    Eat fewer acidic foods, such as citrus and tomato-based foods. These can increase symptoms.    Limit drinking alcohol, caffeine, and fizzy beverages. All increase acid reflux.    Try limiting chocolate, peppermint, and spearmint. These can worsen acid reflux in some people.  Watch when you eat    Avoid lying down for 3 hours after eating.    Do not snack before going to bed.  Raise your head  Raising your head and upper body by 4 to 6 inches helps limit reflux when you re lying down. Put blocks under the head of your bed frame to raise it.  Other changes    Lose weight, if you need to    Don t exercise near bedtime    Avoid tight-fitting clothes    Limit aspirin and ibuprofen    Stop smoking   Date Last Reviewed: 7/1/2016 2000-2018 [employer]. [address] [place] [address], [place] [address]. All rights reserved. This information is not intended as a substitute for professional medical care. Always follow your healthcare professional's instructions.           Patient Education     How Acid Reflux Affects Your Throat    Do you have to clear your throat or cough often? Are you hoarse? Do you have trouble swallowing? If you have these or other  throat symptoms, you may have acid reflux. This occurs when stomach acid flows back up and irritates your throat.  Why you have throat symptoms  There are muscles (esophageal sphincters) at both ends of the tube that carries food to your stomach (the esophagus). These muscles relax to let food pass. Then they tighten to keep stomach acid down. When the lower esophageal sphincter (LES) doesn t tighten enough, acid can flow back (reflux) from your stomach into your esophagus. This may cause heartburn. In some cases the upper esophageal sphincter (UES) also doesn t work well. Then acid can travel higher and enter your throat (pharynx). In many cases, this causes throat symptoms.  Common throat symptoms    Need to clear your throat often    Feeling like you re choking    Long-term (chronic) cough    Hoarseness    Trouble swallowing    Feel like you have a lump in your throat    Sour or acid taste    Sore throat that keeps coming back   Date Last Reviewed: 7/1/2016 2000-2018 [employer]. [address] [place] [address], [place] [address]. All rights reserved. This information is not intended as a substitute for professional medical care. Always follow your healthcare professional's instructions.           Patient Education     Lifestyle Changes for Controlling GERD  When you have GERD, stomach acid feels as if it s backing up toward your mouth. Whether or not you take medicine to control your GERD, your symptoms can often be improved with lifestyle changes. Talk to your healthcare provider about the following suggestions. These suggestions may help you get relief from your symptoms.      Raise your head  Reflux is more likely to strike when you re lying down flat, because stomach fluid can flow backward more easily. Raising the head of your bed 4 to 6 inches can help. To do this:    Slide blocks or books under the legs at the head of your bed. Or, place a wedge under the mattress. Many [employer] stores can make a  suitable wedge for you. The wedge should run from your waist to the top of your head.    Don t just prop your head on several pillows. This increases pressure on your stomach. It can make GERD worse.  Watch your eating habits  Certain foods may increase the acid in your stomach or relax the lower esophageal sphincter. This makes GERD more likely. It s best to avoid the following if they cause you symptoms:    Coffee, tea, and carbonated drinks (with and without caffeine)    Fatty, fried, or spicy food    Mint, chocolate, onions, and tomatoes    Peppermint    Any other foods that seem to irritate your stomach or cause you pain  Relieve the pressure  Tips include the following:    Eat smaller meals, even if you have to eat more often.    Don t lie down right after you eat. Wait a few hours for your stomach to empty.    Avoid tight belts and tight-fitting clothes.    Lose excess weight.  Tobacco and alcohol  Avoid smoking tobacco and drinking alcohol. They can make GERD symptoms worse.  Date Last Reviewed: 7/1/2016 2000-2018 [employer]. [address] [place] [address], [place] [address]. All rights reserved. This information is not intended as a substitute for professional medical care. Always follow your healthcare professional's instructions.

## 2019-12-14 NOTE — PATIENT INSTRUCTIONS
Omeprazole daily   Continue with honey to help sooth the throat  Push Fluids  Plenty of rest  Tylenol and ibuprofen to help with pain or fever  Salt water gargles, anesthetic throat spray, or lozenges to help with sore throat.     Patient Education     Tips to Control Acid Reflux    To control acid reflux, you ll need to make some basic diet and lifestyle changes. The simple steps outlined below may be all you ll need to ease discomfort.  Watch what you eat    Avoid fatty foods and spicy foods.    Eat fewer acidic foods, such as citrus and tomato-based foods. These can increase symptoms.    Limit drinking alcohol, caffeine, and fizzy beverages. All increase acid reflux.    Try limiting chocolate, peppermint, and spearmint. These can worsen acid reflux in some people.  Watch when you eat    Avoid lying down for 3 hours after eating.    Do not snack before going to bed.  Raise your head  Raising your head and upper body by 4 to 6 inches helps limit reflux when you re lying down. Put blocks under the head of your bed frame to raise it.  Other changes    Lose weight, if you need to    Don t exercise near bedtime    Avoid tight-fitting clothes    Limit aspirin and ibuprofen    Stop smoking   Date Last Reviewed: 7/1/2016 2000-2018 [employer].. [address] [place] [address], [place] [address]. All rights reserved. This information is not intended as a substitute for professional medical care. Always follow your healthcare professional's instructions.           Patient Education     How Acid Reflux Affects Your Throat    Do you have to clear your throat or cough often? Are you hoarse? Do you have trouble swallowing? If you have these or other throat symptoms, you may have acid reflux. This occurs when stomach acid flows back up and irritates your throat.  Why you have throat symptoms  There are muscles (esophageal sphincters) at both ends of the tube that carries food to your stomach (the esophagus). These muscles  relax to let food pass. Then they tighten to keep stomach acid down. When the lower esophageal sphincter (LES) doesn t tighten enough, acid can flow back (reflux) from your stomach into your esophagus. This may cause heartburn. In some cases the upper esophageal sphincter (UES) also doesn t work well. Then acid can travel higher and enter your throat (pharynx). In many cases, this causes throat symptoms.  Common throat symptoms    Need to clear your throat often    Feeling like you re choking    Long-term (chronic) cough    Hoarseness    Trouble swallowing    Feel like you have a lump in your throat    Sour or acid taste    Sore throat that keeps coming back   Date Last Reviewed: 7/1/2016 2000-2018 [employer]. [address] [place] [address], [place] [address]. All rights reserved. This information is not intended as a substitute for professional medical care. Always follow your healthcare professional's instructions.           Patient Education     Lifestyle Changes for Controlling GERD  When you have GERD, stomach acid feels as if it s backing up toward your mouth. Whether or not you take medicine to control your GERD, your symptoms can often be improved with lifestyle changes. Talk to your healthcare provider about the following suggestions. These suggestions may help you get relief from your symptoms.      Raise your head  Reflux is more likely to strike when you re lying down flat, because stomach fluid can flow backward more easily. Raising the head of your bed 4 to 6 inches can help. To do this:    Slide blocks or books under the legs at the head of your bed. Or, place a wedge under the mattress. Many [employer] can make a suitable wedge for you. The wedge should run from your waist to the top of your head.    Don t just prop your head on several pillows. This increases pressure on your stomach. It can make GERD worse.  Watch your eating habits  Certain foods may increase the acid in your stomach or  relax the lower esophageal sphincter. This makes GERD more likely. It s best to avoid the following if they cause you symptoms:    Coffee, tea, and carbonated drinks (with and without caffeine)    Fatty, fried, or spicy food    Mint, chocolate, onions, and tomatoes    Peppermint    Any other foods that seem to irritate your stomach or cause you pain  Relieve the pressure  Tips include the following:    Eat smaller meals, even if you have to eat more often.    Don t lie down right after you eat. Wait a few hours for your stomach to empty.    Avoid tight belts and tight-fitting clothes.    Lose excess weight.  Tobacco and alcohol  Avoid smoking tobacco and drinking alcohol. They can make GERD symptoms worse.  Date Last Reviewed: 7/1/2016 2000-2018 [employer]. [address] [place] [address], [place] [address]. All rights reserved. This information is not intended as a substitute for professional medical care. Always follow your healthcare professional's instructions.

## 2019-12-26 ENCOUNTER — OFFICE VISIT (OUTPATIENT)
Dept: FAMILY MEDICINE | Facility: CLINIC | Age: 64
End: 2019-12-26

## 2019-12-26 VITALS
SYSTOLIC BLOOD PRESSURE: 124 MMHG | WEIGHT: 186.6 LBS | DIASTOLIC BLOOD PRESSURE: 82 MMHG | BODY MASS INDEX: 25.31 KG/M2 | HEART RATE: 80 BPM | OXYGEN SATURATION: 99 % | TEMPERATURE: 98.5 F | RESPIRATION RATE: 16 BRPM

## 2019-12-26 DIAGNOSIS — G89.29 CHRONIC THROAT PAIN: ICD-10-CM

## 2019-12-26 DIAGNOSIS — R13.12 OROPHARYNGEAL DYSPHAGIA: Primary | ICD-10-CM

## 2019-12-26 DIAGNOSIS — R07.0 CHRONIC THROAT PAIN: ICD-10-CM

## 2019-12-26 PROCEDURE — 99213 OFFICE O/P EST LOW 20 MIN: CPT | Performed by: FAMILY MEDICINE

## 2019-12-26 RX ORDER — ERGOCALCIFEROL 1.25 MG/1
50000 CAPSULE, LIQUID FILLED ORAL WEEKLY
COMMUNITY
End: 2020-01-07

## 2019-12-26 RX ORDER — UBIDECARENONE 100 MG
CAPSULE ORAL DAILY
COMMUNITY
End: 2020-05-26

## 2019-12-26 NOTE — PROGRESS NOTES
"Problem(s) Oriented visit        SUBJECTIVE:                                                    Waqar Ariza is a 64 year old male who presents to clinic today for pain that dates back to October with some chest pain. He has been on omeprazole and it has not helped. He had CT angio heart that showed mild coronary artery disease only. He has been avoiding all caffeine, nicotine, citrus, mint, alcohol, ASA, and NSAIDs. He is on an entirely bland diet. There are occasions when he feels that foods \"go down the wrong way.\"      Problem list, Medication list, Allergies, and Medical/Social/Surgical histories reviewed in [facility] and updated as appropriate.   Additional history: as documented    ROS:  5 point ROS completed and negative except noted above, including Gen, CV, Resp, GI, MS      Histories:   Patient Active Problem List   Diagnosis     Adenomatous polyp of colon     Advanced directives, counseling/discussion     Mitral valve prolapse     Precordial pain     Abnormal cardiovascular stress test     Nonrheumatic mitral valve regurgitation     Past Surgical History:   Procedure Laterality Date     CATARACT IOL, RT/LT  2004, 2009    Cataract IOL RT/LT     retinal detachment  2005       Social History     Tobacco Use     Smoking status: Never Smoker     Smokeless tobacco: Never Used   Substance Use Topics     Alcohol use: Yes     Comment: special occasions only     Family History   Problem Relation Age of Onset     Hypertension Mother         older in 70s           OBJECTIVE:                                                    /82   Pulse 80   Temp 98.5  F (36.9  C) (Oral)   Resp 16   Wt 84.6 kg (186 lb 9.6 oz)   SpO2 99%   BMI 25.31 kg/m    Body mass index is 25.31 kg/m .   GENERAL APPEARANCE: Alert, no acute distress  HENT: Ears and TMs normal, oral mucosa and posterior oropharynx normal  NECK: No adenopathy,masses or thyromegaly  RESP: lungs clear to auscultation   CV: normal rate, regular rhythm, no " murmur or gallop  ABDOMEN: soft, no organomegaly, masses or tenderness  NEURO: Alert, oriented, speech and mentation normal  PSYCH: mentation appears normal, affect and mood normal   Labs Resulted Today: No results found for any visits on 12/26/19.  ASSESSMENT/PLAN:                                                        Waqar was seen today for throat pain.    Diagnoses and all orders for this visit:    Chronic throat pain / Oropharyngeal dysphagia  Need to rule out reflux as a cause for both. EGD to be done. Stay on diet restrictions and continue omeprazole.      Patient Instructions   Refer for EGD, Dx: chronic sore throat      The following health maintenance items are reviewed in Epic and correct as of today:  Health Maintenance   Topic Date Due     HIV SCREENING  12/03/1970     ZOSTER IMMUNIZATION (2 of 3) 07/21/2017     ADVANCE CARE PLANNING  07/26/2017     PREVENTIVE CARE VISIT  05/31/2020     DTAP/TDAP/TD IMMUNIZATION (2 - Td) 07/26/2020     COLONOSCOPY  07/20/2022     LIPID  05/31/2024     HEPATITIS C SCREENING  Completed     PHQ-2  Completed     INFLUENZA VACCINE  Completed     IPV IMMUNIZATION  Aged Out     MENINGITIS IMMUNIZATION  Aged Out       Fabiola Lawton MD  [facility]  [facility]  [phone]    For any issues my office # is [phone]

## 2020-01-06 DIAGNOSIS — I25.10 CAD (CORONARY ARTERY DISEASE): ICD-10-CM

## 2020-01-06 DIAGNOSIS — I25.10 CORONARY ARTERY DISEASE INVOLVING NATIVE HEART WITHOUT ANGINA PECTORIS, UNSPECIFIED VESSEL OR LESION TYPE: Primary | ICD-10-CM

## 2020-01-06 PROCEDURE — 80061 LIPID PANEL: CPT | Mod: 90 | Performed by: FAMILY MEDICINE

## 2020-01-06 PROCEDURE — 80053 COMPREHEN METABOLIC PANEL: CPT | Mod: 90 | Performed by: FAMILY MEDICINE

## 2020-01-06 PROCEDURE — 36415 COLL VENOUS BLD VENIPUNCTURE: CPT | Performed by: FAMILY MEDICINE

## 2020-01-07 ENCOUNTER — OFFICE VISIT (OUTPATIENT)
Dept: FAMILY MEDICINE | Facility: CLINIC | Age: 65
End: 2020-01-07

## 2020-01-07 VITALS
HEIGHT: 72 IN | SYSTOLIC BLOOD PRESSURE: 120 MMHG | DIASTOLIC BLOOD PRESSURE: 74 MMHG | OXYGEN SATURATION: 98 % | RESPIRATION RATE: 16 BRPM | BODY MASS INDEX: 25.24 KG/M2 | WEIGHT: 186.38 LBS | HEART RATE: 76 BPM

## 2020-01-07 DIAGNOSIS — I25.10 CORONARY ARTERY DISEASE INVOLVING NATIVE HEART WITHOUT ANGINA PECTORIS, UNSPECIFIED VESSEL OR LESION TYPE: Primary | ICD-10-CM

## 2020-01-07 DIAGNOSIS — E78.00 HYPERCHOLESTEREMIA: ICD-10-CM

## 2020-01-07 DIAGNOSIS — K21.00 GASTROESOPHAGEAL REFLUX DISEASE WITH ESOPHAGITIS: ICD-10-CM

## 2020-01-07 LAB
ALBUMIN SERPL-MCNC: 4.4 G/DL (ref 3.6–4.8)
ALBUMIN/GLOB SERPL: 1.6 {RATIO} (ref 1.2–2.2)
ALP SERPL-CCNC: 45 IU/L (ref 39–117)
ALT SERPL-CCNC: 27 IU/L (ref 0–44)
AST SERPL-CCNC: 26 IU/L (ref 0–40)
BILIRUB SERPL-MCNC: 0.4 MG/DL (ref 0–1.2)
BUN SERPL-MCNC: 13 MG/DL (ref 8–27)
BUN/CREATININE RATIO: 15 (ref 10–24)
CALCIUM SERPL-MCNC: 9.3 MG/DL (ref 8.6–10.2)
CHLORIDE SERPLBLD-SCNC: 99 MMOL/L (ref 96–106)
CHOLEST SERPL-MCNC: 123 MG/DL (ref 100–199)
CREAT SERPL-MCNC: 0.87 MG/DL (ref 0.76–1.27)
EGFR IF AFRICN AM: 105 ML/MIN/1.73
EGFR IF NONAFRICN AM: 91 ML/MIN/1.73
GLOBULIN, TOTAL: 2.8 G/DL (ref 1.5–4.5)
GLUCOSE SERPL-MCNC: 88 MG/DL (ref 65–99)
HDLC SERPL-MCNC: 57 MG/DL
LDL/HDL RATIO: 0.9 RATIO (ref 0–3.6)
LDLC SERPL CALC-MCNC: 53 MG/DL (ref 0–99)
POTASSIUM SERPL-SCNC: 4.8 MMOL/L (ref 3.5–5.2)
PROT SERPL-MCNC: 7.2 G/DL (ref 6–8.5)
SODIUM SERPL-SCNC: 139 MMOL/L (ref 134–144)
TOTAL CO2: 31 MMOL/L (ref 20–29)
TRIGL SERPL-MCNC: 64 MG/DL (ref 0–149)
VLDLC SERPL CALC-MCNC: 13 MG/DL (ref 5–40)

## 2020-01-07 PROCEDURE — 99214 OFFICE O/P EST MOD 30 MIN: CPT | Performed by: FAMILY MEDICINE

## 2020-01-07 RX ORDER — ROSUVASTATIN CALCIUM 20 MG/1
20 TABLET, COATED ORAL DAILY
Qty: 90 TABLET | Refills: 3 | Status: SHIPPED | OUTPATIENT
Start: 2020-01-07 | End: 2020-06-01

## 2020-01-07 ASSESSMENT — MIFFLIN-ST. JEOR: SCORE: 1673.39

## 2020-01-07 NOTE — PROGRESS NOTES
The patient complains of typical reflux symptoms: burning sensation after heavy meals, especially when lying down, sometimes with true waterbrash. Denies dysphagia, black or bloody stools or abdominal pain.    Prior of coronary artery disease as listed in medical history.  No current or recent cardiovascaulr symptoms.   No shortness of breath, no episodes of chest pain/pressure, no dyspnea on exertion, no changes in his abiliy to perform physical exertion or tasks.  Takes the same amount of time to perform similar physical tasks.    Tolerating the crestor well.    Has history of hyperlipidemia.  On statin for this, denies any significant side effects of this medication.      Latest labs reviewed:    Recent Labs   Lab Test 01/06/20  0830 05/31/19  0830   CHOL 123 179   HDL 57 53   LDL 53 103*   TRIG 64 115        Lab Results   Component Value Date    AST 26 01/06/2020      Problem(s) Oriented visit      ROS:  General and CV completed and negative except as noted above     HISTORY:   reports current alcohol use.   reports that he has never smoked. He has never used smokeless tobacco.    Past Medical History:   Diagnosis Date     Abnormal stress test 10/28/2019     Adenomatous polyp of colon 2007     Chest pain      Mitral valve disorder 11/24/2014     Right fibular fracture 3/2013     Past Surgical History:   Procedure Laterality Date     CATARACT IOL, RT/LT  2004, 2009    Cataract IOL RT/LT     retinal detachment  2005       EXAM:  BP: 120/74   Pulse: 76    Temp: Data Unavailable    Wt Readings from Last 2 Encounters:   01/07/20 84.5 kg (186 lb 6 oz)   12/26/19 84.6 kg (186 lb 9.6 oz)       BMI= Body mass index is 25.28 kg/m .    EXAM:  APPEARANCE: = Relaxed and in no distress  Conj/Eyelids = noninjected and lids and lashes are without inflammation  PERRLA/Irises = Pupils Round Reactive to Light and Irisis without inflammation  Ears/Nose = External structures and Nares have usual shape and form  Ear canals and TM's =  Canals are not inflammed and have none or little wax and the drums are not injected and have a light reflex   Lips/Teeth/Gums = No lesions seen, good dentition, and gums seem healthy  Oropharynx = No leukoplakia, No injection to the tissues, Normal Uvula  Neck = No anterior or posterior adenopathy appreciated.  Resp effort = Calm regular breathing  Breath Sounds = Good air movement with no rales or rhonchi in any lung fields  Mood/Affect = Cooperative and interested      Assessment/Plan:  Waqar was seen today for results and gastric problem.    Diagnoses and all orders for this visit:    Coronary artery disease involving native heart without angina pectoris, unspecified vessel or lesion type  -     rosuvastatin (CRESTOR) 20 MG tablet; Take 1 tablet (20 mg) by mouth daily    CAD (coronary artery disease)    >25 min spent with patient, greater than 50% spent on discussion/education/planning, etc. About The primary encounter diagnosis was Coronary artery disease involving native heart without angina pectoris, unspecified vessel or lesion type. A diagnosis of CAD (coronary artery disease) was also pertinent to this visit.      COUNSELING:   reports that he has never smoked. He has never used smokeless tobacco.    Estimated body mass index is 25.28 kg/m  as calculated from the following:    Height as of this encounter: 1.829 m (6').    Weight as of this encounter: 84.5 kg (186 lb 6 oz).       Appropriate preventive services were discussed with this patient, including applicable screening as appropriate for cardiovascular disease, diabetes, osteopenia/osteoporosis, and glaucoma.  As appropriate for age/gender, discussed screening for colorectal cancer, prostate cancer, breast cancer, and cervical cancer. Checklist reviewing preventive services available has been given to the patient.    Reviewed patients plan of care and provided an AVS. The Basic Care Plan (routine screening as documented in Health Maintenance) for  Waqar meets the Care Plan requirement. This Care Plan has been established and reviewed with the  Patient.      The following health maintenance items are reviewed in Epic and correct as of today:  Health Maintenance   Topic Date Due     HIV SCREENING  12/03/1970     ADVANCE CARE PLANNING  07/26/2017     PHQ-2  01/01/2020     ZOSTER IMMUNIZATION (3 of 3) 01/02/2020     PREVENTIVE CARE VISIT  05/31/2020     DTAP/TDAP/TD IMMUNIZATION (2 - Td) 07/26/2020     COLONOSCOPY  07/20/2022     HEPATITIS C SCREENING  Completed     INFLUENZA VACCINE  Completed     IPV IMMUNIZATION  Aged Out     MENINGITIS IMMUNIZATION  Aged Out       Surinder Catherine  [facility]  For any issues my office # is [phone]

## 2020-03-02 ENCOUNTER — TRANSFERRED RECORDS (OUTPATIENT)
Dept: FAMILY MEDICINE | Facility: CLINIC | Age: 65
End: 2020-03-02

## 2020-05-26 DIAGNOSIS — Z12.5 SCREENING FOR PROSTATE CANCER: ICD-10-CM

## 2020-05-26 DIAGNOSIS — E78.00 HYPERCHOLESTEREMIA: Primary | ICD-10-CM

## 2020-05-26 PROCEDURE — 36415 COLL VENOUS BLD VENIPUNCTURE: CPT

## 2020-05-26 PROCEDURE — 80061 LIPID PANEL: CPT | Mod: 90

## 2020-05-26 PROCEDURE — 84153 ASSAY OF PSA TOTAL: CPT | Mod: 90

## 2020-05-26 RX ORDER — MULTIVITAMIN,THERAPEUTIC
1 TABLET ORAL DAILY
COMMUNITY
End: 2021-06-14

## 2020-05-26 NOTE — PROGRESS NOTES
pre cpx labs 6/1/20- psa , lipid- pt off cholesterol meds since Feb 2020 due to constant ST- per Florin recommendation

## 2020-05-27 LAB
CHOLEST SERPL-MCNC: 167 MG/DL (ref 100–199)
HDLC SERPL-MCNC: 43 MG/DL
LDL/HDL RATIO: 2.1 RATIO (ref 0–3.6)
LDLC SERPL CALC-MCNC: 91 MG/DL (ref 0–99)
PSA NG/ML: 0.4 NG/ML (ref 0–4)
TRIGL SERPL-MCNC: 166 MG/DL (ref 0–149)
VLDLC SERPL CALC-MCNC: 33 MG/DL (ref 5–40)

## 2020-06-01 ENCOUNTER — OFFICE VISIT (OUTPATIENT)
Dept: FAMILY MEDICINE | Facility: CLINIC | Age: 65
End: 2020-06-01

## 2020-06-01 VITALS
OXYGEN SATURATION: 99 % | RESPIRATION RATE: 16 BRPM | TEMPERATURE: 98.1 F | DIASTOLIC BLOOD PRESSURE: 80 MMHG | BODY MASS INDEX: 25.42 KG/M2 | SYSTOLIC BLOOD PRESSURE: 132 MMHG | WEIGHT: 191.8 LBS | HEIGHT: 73 IN | HEART RATE: 76 BPM

## 2020-06-01 DIAGNOSIS — E78.00 HYPERCHOLESTEREMIA: ICD-10-CM

## 2020-06-01 DIAGNOSIS — Z00.00 ROUTINE GENERAL MEDICAL EXAMINATION AT A HEALTH CARE FACILITY: Primary | ICD-10-CM

## 2020-06-01 DIAGNOSIS — I25.10 CORONARY ARTERY DISEASE INVOLVING NATIVE CORONARY ARTERY OF NATIVE HEART WITHOUT ANGINA PECTORIS: ICD-10-CM

## 2020-06-01 PROCEDURE — 99396 PREV VISIT EST AGE 40-64: CPT | Performed by: FAMILY MEDICINE

## 2020-06-01 ASSESSMENT — MIFFLIN-ST. JEOR: SCORE: 1705.94

## 2020-09-11 PROCEDURE — 90714 TD VACC NO PRESV 7 YRS+ IM: CPT | Performed by: FAMILY MEDICINE

## 2020-09-11 PROCEDURE — 90471 IMMUNIZATION ADMIN: CPT | Performed by: FAMILY MEDICINE

## 2020-12-14 ENCOUNTER — HEALTH MAINTENANCE LETTER (OUTPATIENT)
Age: 65
End: 2020-12-14

## 2021-06-07 DIAGNOSIS — Z12.5 SCREENING FOR PROSTATE CANCER: ICD-10-CM

## 2021-06-07 DIAGNOSIS — I25.10 CORONARY ARTERY DISEASE INVOLVING NATIVE CORONARY ARTERY OF NATIVE HEART WITHOUT ANGINA PECTORIS: ICD-10-CM

## 2021-06-07 DIAGNOSIS — E78.00 HYPERCHOLESTEREMIA: Primary | ICD-10-CM

## 2021-06-07 PROCEDURE — 36415 COLL VENOUS BLD VENIPUNCTURE: CPT | Performed by: FAMILY MEDICINE

## 2021-06-08 LAB
ALBUMIN SERPL-MCNC: 4.4 G/DL (ref 3.8–4.8)
ALBUMIN/GLOB SERPL: 1.5 {RATIO} (ref 1.2–2.2)
ALP SERPL-CCNC: 51 IU/L (ref 48–121)
ALT SERPL-CCNC: 25 IU/L (ref 0–44)
AST SERPL-CCNC: 23 IU/L (ref 0–40)
BILIRUB SERPL-MCNC: 0.4 MG/DL (ref 0–1.2)
BUN SERPL-MCNC: 13 MG/DL (ref 8–27)
BUN/CREATININE RATIO: 12 (ref 10–24)
CALCIUM SERPL-MCNC: 9.3 MG/DL (ref 8.6–10.2)
CHLORIDE SERPLBLD-SCNC: 102 MMOL/L (ref 96–106)
CHOLEST SERPL-MCNC: 176 MG/DL (ref 100–199)
CREAT SERPL-MCNC: 1.07 MG/DL (ref 0.76–1.27)
EGFR IF AFRICN AM: 84 ML/MIN/1.73
EGFR IF NONAFRICN AM: 72 ML/MIN/1.73
GLOBULIN, TOTAL: 2.9 G/DL (ref 1.5–4.5)
GLUCOSE SERPL-MCNC: 87 MG/DL (ref 65–99)
HDLC SERPL-MCNC: 48 MG/DL
LDL/HDL RATIO: 2.2 RATIO (ref 0–3.6)
LDLC SERPL CALC-MCNC: 105 MG/DL (ref 0–99)
POTASSIUM SERPL-SCNC: 4.4 MMOL/L (ref 3.5–5.2)
PROT SERPL-MCNC: 7.3 G/DL (ref 6–8.5)
PSA NG/ML: 0.3 NG/ML (ref 0–4)
SODIUM SERPL-SCNC: 139 MMOL/L (ref 134–144)
TOTAL CO2: 28 MMOL/L (ref 20–29)
TRIGL SERPL-MCNC: 129 MG/DL (ref 0–149)
VLDLC SERPL CALC-MCNC: 23 MG/DL (ref 5–40)

## 2021-06-11 NOTE — RESULT ENCOUNTER NOTE
Dear Waqar,  Here is a copy of your labs, we will discuss them at your upcoming visit.  Surinder Catherine MD

## 2021-06-11 NOTE — PROGRESS NOTES
"  SUBJECTIVE:   Waqar Ariza is a 65 year old male who presents for Preventive Visit.      Patient has been advised of split billing requirements and indicates understanding: Yes  Are you in the first 12 months of your Medicare Part B coverage?  Yes,  Saw eye doctor in Feb 2021 and has appt in August 2021  Physical Health:    In general, how would you rate your overall physical health? excellent    Outside of work, how many days during the week do you exercise? 6-7 days/week    Outside of work, approximately how many minutes a day do you exercise?45-60 minutes    If you drink alcohol do you typically have >3 drinks per day or >7 drinks per week? No    Do you usually eat at least 4 servings of fruit and vegetables a day, include whole grains & fiber and avoid regularly eating high fat or \"junk\" foods? Yes    Do you have any problems taking medications regularly?  No    Do you have any side effects from medications? none    Needs assistance for the following daily activities: no assistance needed    Which of the following safety concerns are present in your home?  throw rugs in the hallway   2  HEARING FREQUENCY TESTED BOTH EARS:  Right Ear/Left Ear        500 Hz: Passed  1000 Hz: Passed   2000 Hz: FAILED both   4000 Hz: FAILED right and passed left    Check for ear wax vs audiologist    Nancy Stallings MA 6/11/2021            Hearing impairment: yes, has ear wax problem in past    In the past 6 months, have you been bothered by leaking of urine? no    Mental Health:    In general, how would you rate your overall mental or emotional health? good  PHQ-2 Score:      Do you feel safe in your environment? Yes    Have you ever done Advance Care Planning? (For example, a Health Directive, POLST, or a discussion with a medical provider or your loved ones about your wishes): needs updated from 2021    Additional concerns to address?  No    Fall risk:  Fallen 2 or more times in the past year?: No  Any fall with injury in the " past year?: No    Cognitive Screenin) Repeat 3 items (Leader, Season, Table)    2) Clock draw: NORMAL  3) 3 item recall: Recalls 3 objects  Results: 3 items recalled: COGNITIVE IMPAIRMENT LESS LIKELY    Mini-CogTM Copyright S Manju. Licensed by the author for use in [facility]; reprinted with permission (cuca@[facility]). All rights reserved.      Do you have sleep apnea, excessive snoring or daytime drowsiness?: no            Reviewed and updated as needed this visit by clinical staff  Tobacco  Allergies  Meds  Problems  Med Hx  Surg Hx  Fam Hx  Soc Hx          Reviewed and updated as needed this visit by Provider  Tobacco    Problems  Med Hx  Surg Hx  Fam Hx  Soc Hx       Social History     Tobacco Use     Smoking status: Never Smoker     Smokeless tobacco: Never Used   Substance Use Topics     Alcohol use: Yes     Comment: special occasions only                           Current providers sharing in care for this patient include:   Patient Care Team:  Surinder Catherine MD as PCP - General (Family Practice)  Surinder Catherine MD as Assigned PCP    The following health maintenance items are reviewed in Epic and correct as of today:  Health Maintenance   Topic Date Due     HIV SCREENING  Never done     FALL RISK ASSESSMENT  Never done     Pneumococcal Vaccine: 65+ Years (1 of 1 - PPSV23) Never done     AORTIC ANEURYSM SCREENING (SYSTEM ASSIGNED)  Never done     MEDICARE ANNUAL WELLNESS VISIT  2022     COLORECTAL CANCER SCREENING  2022     ADVANCE CARE PLANNING  2025     LIPID  [id number]     DTAP/TDAP/TD IMMUNIZATION (3 - Td) 2030     HEPATITIS C SCREENING  Completed     PHQ-2  Completed     INFLUENZA VACCINE  Completed     ZOSTER IMMUNIZATION  Completed     COVID-19 Vaccine  Completed     Pneumococcal Vaccine: Pediatrics (0 to 5 Years) and At-Risk Patients (6 to 64 Years)  Aged Out     IPV IMMUNIZATION  Aged Out     MENINGITIS IMMUNIZATION  Aged Out      "HEPATITIS B IMMUNIZATION  Aged Out     Labs reviewed in EPIC  Pneumonia Vaccine:Adults age 65+ who have not received previous Pneumovax (PPSV23) or PCV13 as an adult: Should first be given PCV13 AND then should be given PPSV23 6-12 months after PCV13    ROS:  Constitutional, HEENT, cardiovascular, pulmonary, GI, , musculoskeletal, neuro, skin, endocrine and psych systems are negative, except as otherwise noted.    OBJECTIVE:   /74   Pulse 86   Resp 16   Ht 1.822 m (5' 11.75\")   Wt 86.4 kg (190 lb 6.4 oz)   SpO2 99%   BMI 26.00 kg/m   Estimated body mass index is 26 kg/m  as calculated from the following:    Height as of this encounter: 1.822 m (5' 11.75\").    Weight as of this encounter: 86.4 kg (190 lb 6.4 oz).  EXAM:   GENERAL: healthy, alert and no distress  EYES: Eyes grossly normal to inspection, PERRL and conjunctivae and sclerae normal  HENT: ear canals and TM's normal, nose and mouth without ulcers or lesions  NECK: no adenopathy, no asymmetry, masses, or scars and thyroid normal to palpation  RESP: lungs clear to auscultation - no rales, rhonchi or wheezes  CV: regular rate and rhythm, normal S1 S2, no S3 or S4, no murmur, click or rub, no peripheral edema and peripheral pulses strong  ABDOMEN: soft, nontender, no hepatosplenomegaly, no masses and bowel sounds normal   (male): normal male genitalia without lesions or urethral discharge, no hernia  RECTAL: normal sphincter tone, no rectal masses, prostate normal size, smooth, nontender without nodules or masses  MS: no gross musculoskeletal defects noted, no edema  SKIN: no suspicious lesions or rashes  NEURO: Normal strength and tone, mentation intact and speech normal  PSYCH: mentation appears normal, affect normal/bright    Diagnostic Test Results:  Labs reviewed in Epic    ASSESSMENT / PLAN:   Waqar was seen today for physical.    Diagnoses and all orders for this visit:    Need for 23-polyvalent pneumococcal polysaccharide vaccine  -    " " PPSV23, IM/SUBQ (2+ YRS) - [id number]  -     ADMIN PNEUMOCOCCAL VACCINE    Encounter for Medicare annual wellness exam    Adenomatous polyp of colon, unspecified part of colon  Due for colonoscopy next year    Coronary artery disease involving native coronary artery of native heart without angina pectoris  The patient does not report any signs or symptoms of angina or active cardiac ischemia.   They do not report any relative changes in their ability to perform physical activities over the past year.    We discussed aggressive secondary risk factor modification, including aggressive BP control (under 130/ideally), aggressive LDL lowering with statin (goal under 100 for sure/under 70 ideally), no smoking, diabetes prevention/management, no smoking, and use of either ASA or similar anti platelet agent if tolerated.    -     rosuvastatin (CRESTOR) 5 MG tablet; Take 1 tablet (5 mg) by mouth daily  -     aspirin (ASA) 81 MG EC tablet; Take 1 tablet (81 mg) by mouth daily    Primary osteoarthritis of both knees  Discussed the pathophysiology, typical presentations and basic management principles of osteoarthritis with the pt.  If they have not already tried scheduled Tylenol dosing, then will start there. IF that did not work then will progress with prn or scheuled OTC NSAIDs.  If NSAIDs contraindicated or cause GI side effects, then will move to COOLEY II agents.  Discussed the potential side effects of these medications with the pt including lvier toxicity, renal dysfunction, GI bleeding, GI upset, brusing, bledding, etc.       Patient has been advised of split billing requirements and indicates understanding: Yes    COUNSELING:  Reviewed preventive health counseling, as reflected in patient instructions       Regular exercise       Healthy diet/nutrition       Hearing screening    Estimated body mass index is 26 kg/m  as calculated from the following:    Height as of this encounter: 1.822 m (5' 11.75\").    Weight as of " this encounter: 86.4 kg (190 lb 6.4 oz).        He reports that he has never smoked. He has never used smokeless tobacco.    Appropriate preventive services were discussed with this patient, including applicable screening as appropriate for cardiovascular disease, diabetes, osteopenia/osteoporosis, and glaucoma.  As appropriate for age/gender, discussed screening for colorectal cancer, prostate cancer, breast cancer, and cervical cancer. Checklist reviewing preventive services available has been given to the patient.    Reviewed patients plan of care and provided an AVS. The Basic Care Plan (routine screening as documented in Health Maintenance) for Waqar meets the Care Plan requirement. This Care Plan has been established and reviewed with the Patient.    Counseling Resources:  ATP IV Guidelines  Pooled Cohorts Equation Calculator  Breast Cancer Risk Calculator  BRCA-Related Cancer Risk Assessment: FHS-7 Tool  FRAX Risk Assessment  ICSI Preventive Guidelines  Dietary Guidelines for Americans, 2010  USDA's MyPlate  ASA Prophylaxis  Lung CA Screening    Surinder Catherine MD  [facility]

## 2021-06-11 NOTE — PATIENT INSTRUCTIONS
"Thank you for coming in today!   If you receive a survey via My Chart, mail or phone, please let us know if there was anything you especially appreciated today or if there is any way we can improve our clinic. We value your input.     Likewise, we are working hard to attend to our digital reputation.    Please consider reviewing our clinic on Google and/or Facebook via the following links:    [email or web address]    We truly appreciate you taking the time to do this!    General Information:    Today you had your appointment with Surinder Catherine MD    I am in the clinic:  Monday and Friday mornings  Tuesday and Thursday afternoons    I am not in the office Wednesdays, non urgent calls received on Wednesday will be addressed when I am back in the office on Thursday.    If lab work was done today as part of your evaluation you will generally be contacted via My Chart, mail, or phone with the results within 1-5 days. If there is an alarming result we will contact you by phone. Lab results come back at varying times, I generally wait until all labs are resulted before making comments on results. Please note labs are automatically released to My Chart once available.    If you need refills please contact your pharmacist. They will send a refill request to me to review. Please allow 3 business days for us to process all refill requests.    Please call or send a medical message with any questions or concerns.    Thank you for choosing [facility].  We truly appreciate you trusting us with your medical care.    Your next visit with us should be scheduled for Follow up in 1 year.     Listed next are the medical health Maintenance items we are tracking for your care and when they are next due (or overdue!)  Our goal is 100% \"up to date.\" Keep this list handy to call and schedule what you are due for in the future!    Health " Maintenance Due   Topic Date Due     HIV SCREENING  Never done     FALL RISK ASSESSMENT  Never done     Pneumococcal Vaccine: 65+ Years (1 of 1 - PPSV23) Never done     AORTIC ANEURYSM SCREENING (SYSTEM ASSIGNED)  Never done       Sincerely,    Surinder Catherine MD    Patient Education   Personalized Prevention Plan  You are due for the preventive services outlined below.  Your care team is available to assist you in scheduling these services.  If you have already completed any of these items, please share that information with your care team to update in your medical record.  Health Maintenance Due   Topic Date Due     HIV Screening  Never done     FALL RISK ASSESSMENT  Never done     Pneumococcal Vaccine (1 of 1 - PPSV23) Never done     AORTIC ANEURYSM SCREENING (SYSTEM ASSIGNED)  Never done     PHQ-2  01/01/2021

## 2021-06-14 ENCOUNTER — OFFICE VISIT (OUTPATIENT)
Dept: FAMILY MEDICINE | Facility: CLINIC | Age: 66
End: 2021-06-14

## 2021-06-14 VITALS
BODY MASS INDEX: 25.79 KG/M2 | HEART RATE: 86 BPM | HEIGHT: 72 IN | WEIGHT: 190.4 LBS | DIASTOLIC BLOOD PRESSURE: 74 MMHG | OXYGEN SATURATION: 99 % | SYSTOLIC BLOOD PRESSURE: 128 MMHG | RESPIRATION RATE: 16 BRPM

## 2021-06-14 DIAGNOSIS — D12.6 ADENOMATOUS POLYP OF COLON, UNSPECIFIED PART OF COLON: ICD-10-CM

## 2021-06-14 DIAGNOSIS — M17.0 PRIMARY OSTEOARTHRITIS OF BOTH KNEES: ICD-10-CM

## 2021-06-14 DIAGNOSIS — I25.10 CORONARY ARTERY DISEASE INVOLVING NATIVE CORONARY ARTERY OF NATIVE HEART WITHOUT ANGINA PECTORIS: ICD-10-CM

## 2021-06-14 DIAGNOSIS — Z23 NEED FOR 23-POLYVALENT PNEUMOCOCCAL POLYSACCHARIDE VACCINE: Primary | ICD-10-CM

## 2021-06-14 DIAGNOSIS — Z00.00 ENCOUNTER FOR MEDICARE ANNUAL WELLNESS EXAM: ICD-10-CM

## 2021-06-14 PROBLEM — R07.2 PRECORDIAL PAIN: Status: RESOLVED | Noted: 2019-11-05 | Resolved: 2021-06-14

## 2021-06-14 PROBLEM — R94.39 ABNORMAL CARDIOVASCULAR STRESS TEST: Status: RESOLVED | Noted: 2019-11-05 | Resolved: 2021-06-14

## 2021-06-14 PROCEDURE — G0402 INITIAL PREVENTIVE EXAM: HCPCS | Performed by: FAMILY MEDICINE

## 2021-06-14 PROCEDURE — 99214 OFFICE O/P EST MOD 30 MIN: CPT | Mod: 25 | Performed by: FAMILY MEDICINE

## 2021-06-14 RX ORDER — LATANOPROST 50 UG/ML
SOLUTION/ DROPS OPHTHALMIC
COMMUNITY
Start: 2021-05-22

## 2021-06-14 RX ORDER — ROSUVASTATIN CALCIUM 5 MG/1
5 TABLET, COATED ORAL DAILY
Qty: 90 TABLET | Refills: 3 | Status: SHIPPED | OUTPATIENT
Start: 2021-06-14 | End: 2022-06-13

## 2021-06-14 ASSESSMENT — MIFFLIN-ST. JEOR: SCORE: 1682.68

## 2021-06-15 DIAGNOSIS — Z23 NEED FOR PNEUMOCOCCAL VACCINATION: Primary | ICD-10-CM

## 2021-06-15 PROCEDURE — G0009 ADMIN PNEUMOCOCCAL VACCINE: HCPCS | Performed by: FAMILY MEDICINE

## 2021-06-15 PROCEDURE — 90732 PPSV23 VACC 2 YRS+ SUBQ/IM: CPT | Performed by: FAMILY MEDICINE

## 2021-10-02 ENCOUNTER — HEALTH MAINTENANCE LETTER (OUTPATIENT)
Age: 66
End: 2021-10-02

## 2021-12-16 ENCOUNTER — OFFICE VISIT (OUTPATIENT)
Dept: FAMILY MEDICINE | Facility: CLINIC | Age: 66
End: 2021-12-16

## 2021-12-16 VITALS
BODY MASS INDEX: 26.49 KG/M2 | DIASTOLIC BLOOD PRESSURE: 92 MMHG | RESPIRATION RATE: 18 BRPM | SYSTOLIC BLOOD PRESSURE: 155 MMHG | HEART RATE: 70 BPM | HEIGHT: 72 IN | OXYGEN SATURATION: 98 % | WEIGHT: 195.6 LBS

## 2021-12-16 DIAGNOSIS — R42 VERTIGO: ICD-10-CM

## 2021-12-16 DIAGNOSIS — H61.20 WAX IN EAR: ICD-10-CM

## 2021-12-16 DIAGNOSIS — I10 PRIMARY HYPERTENSION: Primary | ICD-10-CM

## 2021-12-16 PROCEDURE — 69210 REMOVE IMPACTED EAR WAX UNI: CPT | Performed by: FAMILY MEDICINE

## 2021-12-16 PROCEDURE — 99213 OFFICE O/P EST LOW 20 MIN: CPT | Mod: 25 | Performed by: FAMILY MEDICINE

## 2021-12-16 ASSESSMENT — MIFFLIN-ST. JEOR: SCORE: 1701.27

## 2021-12-16 NOTE — PATIENT INSTRUCTIONS
Buy a BP cuff.  Keep a log of the results,  Come back in 1-2 months and bring the cuff so we can validate the accuracy.    Hypertension   What is hypertension?   Hypertension is blood pressure that keeps being higher than normal. Blood pressure is the force of blood against artery walls as the heart pumps blood through the body. Blood pressure can be unhealthy if it is above 120/80. The higher your blood pressure, the greater the health risk.   High blood pressure can be controlled if you take these steps:     Maintain a healthy weight.     Are physically active.     Follow a healthy eating plan, which includes foods that do not have a lot of salt and sodium.     Do not drink a lot of alcohol.   Our goal is to keep the systolic (top) number 128 or lower and the diastolic (bottom) number 83 or lower

## 2021-12-16 NOTE — PROGRESS NOTES
he has Hypertension which is discovered today to not currently well controlled. he has not been on any medications and is interested to see if we can obtain better control of the blood pressure.    Reviewed last 6 BP readings in chart:  BP Readings from Last 6 Encounters:   12/16/21 (!) 155/92   06/14/21 128/74   06/01/20 132/80   01/07/20 120/74   12/26/19 124/82   12/13/19 111/75     NO active cardiac complaints or symptoms with exercise.     Upon examination, the physician notes an impacted cerumen that was severely impacting the external auditory canal of the right ear. This would have to be removed with instrumentation, not simple lavage. The physician asked the MA to prep the patient for the minor procedure removal, with appropriate instrumentation. The otoscopic exam was challenging due to the deep impaction of the cerumen. The obstruction was removed, and the patient felt immediate relief and his  hearing became improved.     Problem(s) Oriented visit      ROS:  General and Resp. completed and negative except as noted above     HISTORY:   reports current alcohol use.   reports that he has never smoked. He has never used smokeless tobacco.    Past Medical History:   Diagnosis Date     Abnormal stress test 10/28/2019     Adenomatous polyp of colon 2007     Chest pain      Mitral valve disorder 11/24/2014     Right fibular fracture 3/2013     Past Surgical History:   Procedure Laterality Date     CATARACT IOL, RT/LT  2004, 2009    Cataract IOL RT/LT     retinal detachment  2005       EXAM:  BP: 155/92   Pulse: 70    Temp: Data Unavailable    Wt Readings from Last 2 Encounters:   12/16/21 88.7 kg (195 lb 9.6 oz)   06/14/21 86.4 kg (190 lb 6.4 oz)       BMI= Body mass index is 26.71 kg/m .    EXAM:  APPEARANCE: = Relaxed and in no distress  Conj/Eyelids = noninjected and lids and lashes are without inflammation  PERRLA/Irises = Pupils Round Reactive to Light and Irisis without inflammation  Ears/Nose = External  "structures and Nares have usual shape and form  Ear canals and TM's = Canals are not inflammed and have none or little wax and the drums are not injected and have a light reflex   Lips/Teeth/Gums = No lesions seen, good dentition, and gums seem healthy  Oropharynx = No leukoplakia, No injection to the tissues, Normal Uvula  Neck = No anterior or posterior adenopathy appreciated.  Resp effort = Calm regular breathing  Breath Sounds = Good air movement with no rales or rhonchi in any lung fields  Mood/Affect = Cooperative and interested      Assessment/Plan:  Waqar was seen today for ear problem.    Diagnoses and all orders for this visit:    Primary hypertension    Vertigo    Wax in ear        COUNSELING:   reports that he has never smoked. He has never used smokeless tobacco.    Estimated body mass index is 26.71 kg/m  as calculated from the following:    Height as of this encounter: 1.822 m (5' 11.75\").    Weight as of this encounter: 88.7 kg (195 lb 9.6 oz).       Appropriate preventive services were discussed with this patient, including applicable screening as appropriate for cardiovascular disease, diabetes, osteopenia/osteoporosis, and glaucoma.  As appropriate for age/gender, discussed screening for colorectal cancer, prostate cancer, breast cancer, and cervical cancer. Checklist reviewing preventive services available has been given to the patient.    Reviewed patients plan of care and provided an AVS. The Basic Care Plan (routine screening as documented in Health Maintenance) for Waqar meets the Care Plan requirement. This Care Plan has been established and reviewed with the  Patient.      The following health maintenance items are reviewed in Epic and correct as of today:  Health Maintenance   Topic Date Due     AORTIC ANEURYSM SCREENING (SYSTEM ASSIGNED)  Never done     MEDICARE ANNUAL WELLNESS VISIT  06/14/2022     FALL RISK ASSESSMENT  06/14/2022     COLORECTAL CANCER SCREENING  07/20/2022     ADVANCE " CARE PLANNING  06/01/2025     LIPID  06/07/2026     DTAP/TDAP/TD IMMUNIZATION (3 - Td or Tdap) 09/11/2030     HEPATITIS C SCREENING  Completed     PHQ-2  Completed     INFLUENZA VACCINE  Completed     Pneumococcal Vaccine: 65+ Years  Completed     ZOSTER IMMUNIZATION  Completed     COVID-19 Vaccine  Completed     IPV IMMUNIZATION  Aged Out     MENINGITIS IMMUNIZATION  Aged Out     HEPATITIS B IMMUNIZATION  Aged Out       Surinder Catherine  [facility]  For any issues my office # is [phone]

## 2022-02-08 ENCOUNTER — OFFICE VISIT (OUTPATIENT)
Dept: FAMILY MEDICINE | Facility: CLINIC | Age: 67
End: 2022-02-08

## 2022-02-08 VITALS
SYSTOLIC BLOOD PRESSURE: 174 MMHG | BODY MASS INDEX: 26.63 KG/M2 | HEART RATE: 93 BPM | OXYGEN SATURATION: 96 % | WEIGHT: 195 LBS | DIASTOLIC BLOOD PRESSURE: 98 MMHG

## 2022-02-08 DIAGNOSIS — F32.1 CURRENT MODERATE EPISODE OF MAJOR DEPRESSIVE DISORDER WITHOUT PRIOR EPISODE (H): Primary | ICD-10-CM

## 2022-02-08 DIAGNOSIS — M77.12 LATERAL EPICONDYLITIS OF LEFT ELBOW: ICD-10-CM

## 2022-02-08 PROCEDURE — 99214 OFFICE O/P EST MOD 30 MIN: CPT | Performed by: FAMILY MEDICINE

## 2022-02-08 ASSESSMENT — ANXIETY QUESTIONNAIRES
IF YOU CHECKED OFF ANY PROBLEMS ON THIS QUESTIONNAIRE, HOW DIFFICULT HAVE THESE PROBLEMS MADE IT FOR YOU TO DO YOUR WORK, TAKE CARE OF THINGS AT HOME, OR GET ALONG WITH OTHER PEOPLE: SOMEWHAT DIFFICULT
GAD7 TOTAL SCORE: 14
1. FEELING NERVOUS, ANXIOUS, OR ON EDGE: MORE THAN HALF THE DAYS
3. WORRYING TOO MUCH ABOUT DIFFERENT THINGS: NEARLY EVERY DAY
5. BEING SO RESTLESS THAT IT IS HARD TO SIT STILL: MORE THAN HALF THE DAYS
6. BECOMING EASILY ANNOYED OR IRRITABLE: MORE THAN HALF THE DAYS
7. FEELING AFRAID AS IF SOMETHING AWFUL MIGHT HAPPEN: SEVERAL DAYS
2. NOT BEING ABLE TO STOP OR CONTROL WORRYING: MORE THAN HALF THE DAYS

## 2022-02-08 ASSESSMENT — PATIENT HEALTH QUESTIONNAIRE - PHQ9
5. POOR APPETITE OR OVEREATING: MORE THAN HALF THE DAYS
SUM OF ALL RESPONSES TO PHQ QUESTIONS 1-9: 12

## 2022-02-08 NOTE — PATIENT INSTRUCTIONS
Rody Liao  Talk Therapy, [facility]  [address] [address]  [phone]     Sonia Ceballos  Counseling for individuals, families and couples  I look forward to hearing from you.  My phone number is: [phone]  My office is located in [place], near the intersection of [address], not far from [facility].  My address is:   [address] [place] [id number]        This is what the Thera-Band Flexbar looks like

## 2022-02-08 NOTE — PROGRESS NOTES
"he has Hypertension which is currently not well controlled. he has been compliant with his medications and is here today to follow up on the this issue and see if we can't work on better control of the blood pressure.    Reviewed last 6 BP readings in chart:  BP Readings from Last 6 Encounters:   02/08/22 (!) 174/98   12/16/21 (!) 155/92   06/14/21 128/74   06/01/20 132/80   01/07/20 120/74   12/26/19 124/82     he  has not experienced any significant side effects from current medications for hypertension.    NO active cardiac complaints or symptoms with exercise.  Depression:  Has known history of depression.  Has been on medication for this.  The patient does not report any significant side effects of this medication.  The prior symptoms leading to the original diagnosis and decision to start medication therapy are not yet optimal.     Appetite is stable.  Sleeping patterns are stable.  No reported thoughts of suicide or homicide.  Last PHQ-9 score on record=   PHQ-9 SCORE 2/8/2022   PHQ-9 Total Score 12     ANXIETY:  Has known history of anxiety and has been on medication for this.  The patient does not report any significant side effects of this medication.  The prior symptoms leading to the original diagnosis and decision to start medication therapy are worse.     Appetite is stable.  Sleeping patterns are stable.    Overall, the level of \"racing thoughts\", emotional lability, and ease of agitation are worse.        ANDREA-7 SCORE 2/8/2022   Total Score 14     Problem(s) Oriented visit      ROS:  General and Resp. completed and negative except as noted above     HISTORY:   reports current alcohol use.   reports that he has never smoked. He has never used smokeless tobacco.    Past Medical History:   Diagnosis Date     Abnormal stress test 10/28/2019     Adenomatous polyp of colon 2007     Chest pain      Mitral valve disorder 11/24/2014     Right fibular fracture 3/2013     Past Surgical History:   Procedure " "Laterality Date     CATARACT IOL, RT/LT  2004, 2009    Cataract IOL RT/LT     retinal detachment  2005       EXAM:  BP: 174/98[manual adult reg cuff[   Pulse: 93    Temp: Data Unavailable    Wt Readings from Last 2 Encounters:   02/08/22 88.5 kg (195 lb)   12/16/21 88.7 kg (195 lb 9.6 oz)       BMI= Body mass index is 26.63 kg/m .    EXAM:  APPEARANCE: = Relaxed and in no distress  Lips/Teeth/Gums = No lesions seen, good dentition, and gums seem healthy      Assessment/Plan:  Waqar was seen today for recheck and mental health problem.    Diagnoses and all orders for this visit:    Current moderate episode of major depressive disorder without prior episode (H)  Spoke at length about mood disorders including suspected pathophysiology, role of neurotransmitters, and treatment options including medication options ( especially SSRIs that treat cause of sx) and counselling.  Tolerating current meds. We discussed ongoing management of his  medications and how we will refill the medications now and in the future.    -     sertraline (ZOLOFT) 50 MG tablet; Take 1 tablet (50 mg) by mouth daily    Lateral epicondylitis of left elbow  Discussed lateral epicondylitis in detail, including mechanical features of the injury, ergonomic issues to improve on to help recovery, basic conservative measures such as rest, ice, NSAIDs OTC with food, and wearing a forearm brace to alleviate some tension on forearm muscles.  If sx are that bad or fail to improve, then a steroid injection to the lateral epicondyle may need to be considered.        COUNSELING:   reports that he has never smoked. He has never used smokeless tobacco.    Estimated body mass index is 26.63 kg/m  as calculated from the following:    Height as of 12/16/21: 1.822 m (5' 11.75\").    Weight as of this encounter: 88.5 kg (195 lb).       Appropriate preventive services were discussed with this patient, including applicable screening as appropriate for cardiovascular " disease, diabetes, osteopenia/osteoporosis, and glaucoma.  As appropriate for age/gender, discussed screening for colorectal cancer, prostate cancer, breast cancer, and cervical cancer. Checklist reviewing preventive services available has been given to the patient.    Reviewed patients plan of care and provided an AVS. The Basic Care Plan (routine screening as documented in Health Maintenance) for Waqar meets the Care Plan requirement. This Care Plan has been established and reviewed with the  Patient.      The following health maintenance items are reviewed in Epic and correct as of today:  Health Maintenance   Topic Date Due     AORTIC ANEURYSM SCREENING (SYSTEM ASSIGNED)  Never done     MEDICARE ANNUAL WELLNESS VISIT  06/14/2022     FALL RISK ASSESSMENT  06/14/2022     COLORECTAL CANCER SCREENING  07/20/2022     ADVANCE CARE PLANNING  06/01/2025     LIPID  06/07/2026     DTAP/TDAP/TD IMMUNIZATION (3 - Td or Tdap) 09/11/2030     HEPATITIS C SCREENING  Completed     PHQ-2  Completed     INFLUENZA VACCINE  Completed     Pneumococcal Vaccine: 65+ Years  Completed     ZOSTER IMMUNIZATION  Completed     COVID-19 Vaccine  Completed     IPV IMMUNIZATION  Aged Out     MENINGITIS IMMUNIZATION  Aged Out     HEPATITIS B IMMUNIZATION  Aged Out       Surinder Catherine  [facility]  For any issues my office # is [phone]

## 2022-02-09 ASSESSMENT — ANXIETY QUESTIONNAIRES: GAD7 TOTAL SCORE: 14

## 2022-03-02 DIAGNOSIS — F32.1 CURRENT MODERATE EPISODE OF MAJOR DEPRESSIVE DISORDER WITHOUT PRIOR EPISODE (H): ICD-10-CM

## 2022-03-10 ENCOUNTER — OFFICE VISIT (OUTPATIENT)
Dept: FAMILY MEDICINE | Facility: CLINIC | Age: 67
End: 2022-03-10

## 2022-03-10 VITALS
OXYGEN SATURATION: 98 % | BODY MASS INDEX: 26.66 KG/M2 | HEART RATE: 67 BPM | SYSTOLIC BLOOD PRESSURE: 135 MMHG | WEIGHT: 196.8 LBS | DIASTOLIC BLOOD PRESSURE: 83 MMHG | HEIGHT: 72 IN | RESPIRATION RATE: 18 BRPM

## 2022-03-10 DIAGNOSIS — F33.42 MAJOR DEPRESSIVE DISORDER, RECURRENT EPISODE, IN FULL REMISSION (H): ICD-10-CM

## 2022-03-10 DIAGNOSIS — I10 PRIMARY HYPERTENSION: Primary | ICD-10-CM

## 2022-03-10 PROCEDURE — 99212 OFFICE O/P EST SF 10 MIN: CPT | Performed by: FAMILY MEDICINE

## 2022-03-10 ASSESSMENT — ANXIETY QUESTIONNAIRES
6. BECOMING EASILY ANNOYED OR IRRITABLE: NOT AT ALL
7. FEELING AFRAID AS IF SOMETHING AWFUL MIGHT HAPPEN: NOT AT ALL
GAD7 TOTAL SCORE: 2
2. NOT BEING ABLE TO STOP OR CONTROL WORRYING: NOT AT ALL
5. BEING SO RESTLESS THAT IT IS HARD TO SIT STILL: NOT AT ALL
3. WORRYING TOO MUCH ABOUT DIFFERENT THINGS: SEVERAL DAYS
IF YOU CHECKED OFF ANY PROBLEMS ON THIS QUESTIONNAIRE, HOW DIFFICULT HAVE THESE PROBLEMS MADE IT FOR YOU TO DO YOUR WORK, TAKE CARE OF THINGS AT HOME, OR GET ALONG WITH OTHER PEOPLE: NOT DIFFICULT AT ALL
1. FEELING NERVOUS, ANXIOUS, OR ON EDGE: SEVERAL DAYS

## 2022-03-10 ASSESSMENT — PATIENT HEALTH QUESTIONNAIRE - PHQ9
5. POOR APPETITE OR OVEREATING: NOT AT ALL
SUM OF ALL RESPONSES TO PHQ QUESTIONS 1-9: 1

## 2022-03-10 NOTE — PROGRESS NOTES
Depression:  Has known history of depression.  Has been on medication for this.  The patient does not report any significant side effects of this medication.  The prior symptoms leading to the original diagnosis and decision to start medication therapy are better.     Appetite is stable.  Sleeping patterns are stable.  No reported thoughts of suicide or homicide.  Had seen Rody PORTER And doing well! thinks was much more anxiety  Last 3 PHQ9 results:  PHQ-9 SCORE 2/8/2022 3/10/2022   PHQ-9 Total Score 12 1     Essential Hypertension   Remains well controlled when checked out of clinic.   he has not experienced any significant side effects from medications for hypertension.    NO active cardiac complaints or symptoms with exercise.  Current medications for treatment:  No meds and much improved.    Reviewed last 6 BP readings in chart:  BP Readings from Last 6 Encounters:   03/10/22 135/83   02/08/22 (!) 174/98   12/16/21 (!) 155/92   06/14/21 128/74   06/01/20 132/80   01/07/20 120/74       Assessment/Plan:  Waqar was seen today for recheck.    Diagnoses and all orders for this visit:    Primary hypertension    Temporary anxiety reaction, now resolved.    Much improved.  Surindre Catherine MD   [facility]  [phone]

## 2022-03-11 ASSESSMENT — ANXIETY QUESTIONNAIRES: GAD7 TOTAL SCORE: 2

## 2022-06-09 DIAGNOSIS — E78.00 HYPERCHOLESTEREMIA: Primary | ICD-10-CM

## 2022-06-09 DIAGNOSIS — Z12.5 SCREENING FOR PROSTATE CANCER: ICD-10-CM

## 2022-06-09 LAB
CHOL/HDL RATIO (RMG): 2.7 MG/DL (ref 0–4.5)
CHOLESTEROL: 119 MG/DL (ref 100–199)
HDL (RMG): 45 MG/DL (ref 40–?)
LDL CALCULATED (RMG): 55 MG/DL (ref 0–130)
TRIGLYCERIDES (RMG): 96 MG/DL (ref 0–149)

## 2022-06-09 PROCEDURE — 36415 COLL VENOUS BLD VENIPUNCTURE: CPT | Performed by: FAMILY MEDICINE

## 2022-06-09 PROCEDURE — 80061 LIPID PANEL: CPT | Mod: QW | Performed by: FAMILY MEDICINE

## 2022-06-10 LAB
ALBUMIN SERPL-MCNC: 4.5 G/DL (ref 3.8–4.8)
ALBUMIN/GLOB SERPL: 1.4 {RATIO} (ref 1.2–2.2)
ALP SERPL-CCNC: 49 IU/L (ref 44–121)
ALT SERPL-CCNC: 36 IU/L (ref 0–44)
AST SERPL-CCNC: 29 IU/L (ref 0–40)
BILIRUB SERPL-MCNC: 0.8 MG/DL (ref 0–1.2)
BUN SERPL-MCNC: 14 MG/DL (ref 8–27)
BUN/CREATININE RATIO: 15 (ref 10–24)
CALCIUM SERPL-MCNC: 9.3 MG/DL (ref 8.6–10.2)
CHLORIDE SERPLBLD-SCNC: 99 MMOL/L (ref 96–106)
CREAT SERPL-MCNC: 0.95 MG/DL (ref 0.76–1.27)
EGFR: 88 ML/MIN/1.73
GLOBULIN, TOTAL: 3.2 G/DL (ref 1.5–4.5)
GLUCOSE SERPL-MCNC: 87 MG/DL (ref 65–99)
POTASSIUM SERPL-SCNC: 5 MMOL/L (ref 3.5–5.2)
PROT SERPL-MCNC: 7.7 G/DL (ref 6–8.5)
PSA NG/ML: 0.4 NG/ML (ref 0–4)
SODIUM SERPL-SCNC: 139 MMOL/L (ref 134–144)
TOTAL CO2: 24 MMOL/L (ref 20–29)

## 2022-06-10 NOTE — RESULT ENCOUNTER NOTE
Dear Waqar,     I am writing to report that your included test results are as expected.  Most labs contain some results that are slightly outside of the normal range, I have reviewed each of these results and they require no changes at this time.    Surinder Catherine MD

## 2022-06-13 DIAGNOSIS — I25.10 CORONARY ARTERY DISEASE INVOLVING NATIVE CORONARY ARTERY OF NATIVE HEART WITHOUT ANGINA PECTORIS: ICD-10-CM

## 2022-06-13 RX ORDER — ROSUVASTATIN CALCIUM 5 MG/1
TABLET, COATED ORAL
Qty: 90 TABLET | Refills: 3 | Status: SHIPPED | OUTPATIENT
Start: 2022-06-13 | End: 2022-06-16

## 2022-06-13 ASSESSMENT — ENCOUNTER SYMPTOMS
DIZZINESS: 0
WEAKNESS: 0
NERVOUS/ANXIOUS: 1
HEARTBURN: 0
HEMATOCHEZIA: 0
FEVER: 0
NAUSEA: 0
SORE THROAT: 0
COUGH: 0
HEMATURIA: 0
DYSURIA: 0
DIARRHEA: 0
EYE PAIN: 0
PALPITATIONS: 0
MYALGIAS: 0
ARTHRALGIAS: 1
CONSTIPATION: 0
HEADACHES: 0
CHILLS: 0
ABDOMINAL PAIN: 0
PARESTHESIAS: 0
FREQUENCY: 0
SHORTNESS OF BREATH: 0
JOINT SWELLING: 0

## 2022-06-13 ASSESSMENT — ACTIVITIES OF DAILY LIVING (ADL): CURRENT_FUNCTION: NO ASSISTANCE NEEDED

## 2022-06-16 ENCOUNTER — OFFICE VISIT (OUTPATIENT)
Dept: FAMILY MEDICINE | Facility: CLINIC | Age: 67
End: 2022-06-16

## 2022-06-16 VITALS
BODY MASS INDEX: 26.58 KG/M2 | SYSTOLIC BLOOD PRESSURE: 128 MMHG | HEART RATE: 91 BPM | HEIGHT: 72 IN | DIASTOLIC BLOOD PRESSURE: 78 MMHG | WEIGHT: 196.25 LBS | OXYGEN SATURATION: 97 % | RESPIRATION RATE: 16 BRPM

## 2022-06-16 DIAGNOSIS — D12.6 ADENOMATOUS POLYP OF COLON, UNSPECIFIED PART OF COLON: ICD-10-CM

## 2022-06-16 DIAGNOSIS — I25.10 CORONARY ARTERY DISEASE INVOLVING NATIVE CORONARY ARTERY OF NATIVE HEART WITHOUT ANGINA PECTORIS: ICD-10-CM

## 2022-06-16 DIAGNOSIS — Z00.00 ENCOUNTER FOR MEDICARE ANNUAL WELLNESS EXAM: Primary | ICD-10-CM

## 2022-06-16 DIAGNOSIS — I34.0 NONRHEUMATIC MITRAL VALVE REGURGITATION: ICD-10-CM

## 2022-06-16 DIAGNOSIS — Z23 NEED FOR VACCINATION: ICD-10-CM

## 2022-06-16 DIAGNOSIS — F33.42 MAJOR DEPRESSIVE DISORDER, RECURRENT EPISODE, IN FULL REMISSION (H): ICD-10-CM

## 2022-06-16 PROBLEM — M17.0 PRIMARY OSTEOARTHRITIS OF BOTH KNEES: Status: RESOLVED | Noted: 2021-06-14 | Resolved: 2022-06-16

## 2022-06-16 PROCEDURE — G0439 PPPS, SUBSEQ VISIT: HCPCS | Performed by: FAMILY MEDICINE

## 2022-06-16 PROCEDURE — G0009 ADMIN PNEUMOCOCCAL VACCINE: HCPCS | Mod: 59 | Performed by: FAMILY MEDICINE

## 2022-06-16 PROCEDURE — 99214 OFFICE O/P EST MOD 30 MIN: CPT | Mod: 25 | Performed by: FAMILY MEDICINE

## 2022-06-16 PROCEDURE — 90732 PPSV23 VACC 2 YRS+ SUBQ/IM: CPT | Performed by: FAMILY MEDICINE

## 2022-06-16 RX ORDER — ROSUVASTATIN CALCIUM 5 MG/1
5 TABLET, COATED ORAL
Qty: 90 TABLET | Refills: 3 | COMMUNITY
Start: 2022-06-16 | End: 2023-06-19

## 2022-06-16 NOTE — PROGRESS NOTES
"Answers for HPI/ROS submitted by the patient on 6/13/2022  In general, how would you rate your overall physical health?: good  Frequency of exercise:: 6-7 days/week  Do you usually eat at least 4 servings of fruit and vegetables a day, include whole grains & fiber, and avoid regularly eating high fat or \"junk\" foods? : Yes  Taking medications regularly:: Yes  Medication side effects:: None  Activities of Daily Living: no assistance needed  Home safety: throw rugs in the hallway  Hearing Impairment:: difficulty following a conversation in a noisy restaurant or crowded room, need to ask people to speak up or repeat themselves, difficulty understanding soft or whispered speech  In the past 6 months, have you been bothered by leaking of urine?: No  abdominal pain: No  Blood in stool: No  Blood in urine: No  chest pain: No  chills: No  congestion: No  constipation: No  cough: No  diarrhea: No  dizziness: No  ear pain: No  eye pain: No  nervous/anxious: Yes  fever: No  frequency: No  genital sores: No  headaches: No  hearing loss: No  heartburn: No  arthralgias: Yes  joint swelling: No  peripheral edema: No  mood changes: Yes  myalgias: No  nausea: No  dysuria: No  palpitations: No  Skin sensation changes: No  sore throat: No  urgency: Yes  rash: No  shortness of breath: No  visual disturbance: No  weakness: No  impotence: No  penile discharge: No  In general, how would you rate your overall mental or emotional health?: fair  Additional concerns today:: Yes  Duration of exercise:: 45-60 minutes      SUBJECTIVE:   Waqar Ariza is a 66 year old male who presents for Preventive Visit.    Hyperlipidemia:  Has history of hyperlipidemia.    The patient is taking a medication for this.  Denies any significant side effects from his medication.      Latest labs reviewed:    Recent Labs   Lab Test 06/07/21  0921 05/26/20  0827   CHOL 176 167   HDL 48 43   * 91   TRIG 129 166*        Lab Results   Component Value Date    " AST 29 2022      Cad no symptoms  On a statin and asa    Anxiety better    Are you in the first 12 months of your Medicare Part B coverage?  No    Physical Health:    Hearing impairment: Yes    In the past 6 months, have you been bothered by leaking of urine? no    Mental Health:    In general, how would you rate your overall mental or emotional health? good  PHQ-2 Score: (P) 1    Do you feel safe in your environment? Yes    Have you ever done Advance Care Planning? (For example, a Health Directive, POLST, or a discussion with a medical provider or your loved ones about your wishes): Yes, advance care planning is on file.    Additional concerns to address?  No    Fall risk: None       Cognitive Screenin) Repeat 3 items (Leader, Season, Table)    2) Clock draw: NORMAL  3) 3 item recall: Recalls 3 objects  Results: 3 items recalled: COGNITIVE IMPAIRMENT LESS LIKELY    Mini-CogTM Copyright S Manju. Licensed by the author for use in [facility]; reprinted with permission ([email or web address]). All rights reserved.      Do you have sleep apnea, excessive snoring or daytime drowsiness?: no        PROBLEMS TO ADD ON...  -------------------------------------    Reviewed and updated as needed this visit by clinical staff   Tobacco  Allergies  Meds  Problems  Med Hx  Surg Hx  Fam Hx  Soc   Hx          Reviewed and updated as needed this visit by Provider   Tobacco    Problems  Med Hx  Surg Hx  Fam Hx  Soc Hx         Social History     Tobacco Use     Smoking status: Never Smoker     Smokeless tobacco: Never Used   Substance Use Topics     Alcohol use: Yes     Comment: special occasions only                           Current providers sharing in care for this patient include:   Patient Care Team:  Surinder Catherien MD as PCP - General (Family Practice)  Suirnder Catherine MD as Assigned PCP    The following health maintenance items are reviewed in Epic and correct as of today:  Health  Maintenance   Topic Date Due     AORTIC ANEURYSM SCREENING (SYSTEM ASSIGNED)  Never done     FALL RISK ASSESSMENT  06/14/2022     Pneumococcal Vaccine: 65+ Years (2 - PCV) 06/15/2022     COLORECTAL CANCER SCREENING  07/20/2022     PHQ-9  09/10/2022     MEDICARE ANNUAL WELLNESS VISIT  06/16/2023     LIPID  06/09/2027     ADVANCE CARE PLANNING  06/16/2027     DTAP/TDAP/TD IMMUNIZATION (3 - Td or Tdap) 09/11/2030     HEPATITIS C SCREENING  Completed     DEPRESSION ACTION PLAN  Completed     INFLUENZA VACCINE  Completed     ZOSTER IMMUNIZATION  Completed     COVID-19 Vaccine  Completed     IPV IMMUNIZATION  Aged Out     MENINGITIS IMMUNIZATION  Aged Out     HEPATITIS B IMMUNIZATION  Aged Out     Labs reviewed in EPIC  Pneumonia Vaccine:For adults 65 years or older who do not have an immunocompromising condition, cerebrospinal fluid leak, or cochlear implant and want to receive PCV13 AND PPSV23: Administer 1 dose of PCV13 first then give 1 dose of PPSV23 at least 1 year later. If the patient already received PPSV23, give the dose of PCV13 at least 1 year after they received the most recent dose of PPSV23. Anyone who received any doses of PPSV23 before age 65 should receive 1 final dose of the vaccine at age 65 or older. Administer this last dose at least 5 years after the prior PPSV23 dose.    ROS:  15 minor issues would like to address  Constitutional, HEENT, cardiovascular, pulmonary, GI, , musculoskeletal, neuro, skin, endocrine and psych systems are negative, except as otherwise noted.    OBJECTIVE:   /78   Pulse 91   Resp 16   Ht 1.829 m (6')   Wt 89 kg (196 lb 4 oz)   SpO2 97%   BMI 26.62 kg/m   Estimated body mass index is 26.62 kg/m  as calculated from the following:    Height as of this encounter: 1.829 m (6').    Weight as of this encounter: 89 kg (196 lb 4 oz).  EXAM:   GENERAL: healthy, alert and no distress  EYES: Eyes grossly normal to inspection, PERRL and conjunctivae and sclerae  normal  HENT: ear canals and TM's normal, nose and mouth without ulcers or lesions  NECK: no adenopathy, no asymmetry, masses, or scars and thyroid normal to palpation  RESP: lungs clear to auscultation - no rales, rhonchi or wheezes  CV: regular rate and rhythm, normal S1 S2, no S3 or S4, no murmur, click or rub, no peripheral edema and peripheral pulses strong  ABDOMEN: soft, nontender, no hepatosplenomegaly, no masses and bowel sounds normal   (male): testicles normal without atrophy or masses, hernia small on the left and penis normal without urethral discharge  RECTAL: normal sphincter tone, no rectal masses, prostate normal size, smooth, nontender without nodules or masses  MS: no gross musculoskeletal defects noted, no edema  SKIN: no suspicious lesions or rashes  NEURO: Normal strength and tone, mentation intact and speech normal  PSYCH: mentation appears normal, affect normal/bright    Diagnostic Test Results:  Labs reviewed in Epic    ASSESSMENT / PLAN:   Waqar was seen today for physical.    Diagnoses and all orders for this visit:    Encounter for Medicare annual wellness exam    Coronary artery disease involving native coronary artery of native heart without angina pectoris  The patient does not report any signs or symptoms of angina or active cardiac ischemia.   They do not report any relative changes in their ability to perform physical activities over the past year.    We discussed aggressive secondary risk factor modification, including aggressive BP control (under 130/ideally), aggressive LDL lowering with statin (goal under 100 for sure/under 70 ideally), no smoking, diabetes prevention/management, no smoking, and use of either ASA or similar anti platelet agent if tolerated.      -     aspirin (ASA) 81 MG EC tablet; Take 1 tablet (81 mg) by mouth every 48 hours    Adenomatous polyp of colon, unspecified part of colon  Scheduled in a month    Nonrheumatic mitral valve  regurgitation  Stable    Anxiety reaction, improved.  >30 min spent with patient, greater than 50% spent on discussion/education/planning, etc. About The primary encounter diagnosis was Encounter for Medicare annual wellness exam. Diagnoses of Coronary artery disease involving native coronary artery of native heart without angina pectoris, Adenomatous polyp of colon, unspecified part of colon, Nonrheumatic mitral valve regurgitation, Anxiety reaction, improved., and Need for vaccination were also pertinent to this visit.        Need for vaccination  -     ADMIN PNEUMOCOCCAL VACCINE    Other orders  -     PNEUMOCOCCAL VACCINE,ADULT,SQ OR IM        Patient has been advised of split billing requirements and indicates understanding: Yes    COUNSELING:  Reviewed preventive health counseling, as reflected in patient instructions       Regular exercise       Healthy diet/nutrition       Vision screening    Estimated body mass index is 26.62 kg/m  as calculated from the following:    Height as of this encounter: 1.829 m (6').    Weight as of this encounter: 89 kg (196 lb 4 oz).        He reports that he has never smoked. He has never used smokeless tobacco.    Appropriate preventive services were discussed with this patient, including applicable screening as appropriate for cardiovascular disease, diabetes, osteopenia/osteoporosis, and glaucoma.  As appropriate for age/gender, discussed screening for colorectal cancer, prostate cancer, breast cancer, and cervical cancer. Checklist reviewing preventive services available has been given to the patient.    Reviewed patients plan of care and provided an AVS. The Basic Care Plan (routine screening as documented in Health Maintenance) for Waqar meets the Care Plan requirement. This Care Plan has been established and reviewed with the Patient.    Counseling Resources:  ATP IV Guidelines  Pooled Cohorts Equation Calculator  Breast Cancer Risk Calculator  BRCA-Related Cancer Risk  Assessment: FHS-7 Tool  FRAX Risk Assessment  ICSI Preventive Guidelines  Dietary Guidelines for Americans, 2010  USDA's MyPlate  ASA Prophylaxis  Lung CA Screening    Surinder Catherine MD  [facility]

## 2022-06-21 ENCOUNTER — OFFICE VISIT (OUTPATIENT)
Dept: NUTRITION | Facility: CLINIC | Age: 67
End: 2022-06-21
Payer: MEDICARE

## 2022-06-21 DIAGNOSIS — I10 PRIMARY HYPERTENSION: ICD-10-CM

## 2022-06-21 DIAGNOSIS — I25.10 CAD (CORONARY ARTERY DISEASE): Primary | ICD-10-CM

## 2022-06-21 PROCEDURE — 97802 MEDICAL NUTRITION INDIV IN: CPT | Performed by: DIETITIAN, REGISTERED

## 2022-06-21 NOTE — PROGRESS NOTES
"Medical Nutrition Therapy  Visit Type:Initial assessment and intervention    Waqar Ariza presents today for MNT and education related to hyperlipidemia and CAD, general wellness info desired too, sibs with HTN so he is watching salt .  He is accompanied by self.     ASSESSMENT:   Patient comments/concerns relating to nutrition: \"My goal today is what to do with my diet\"- lives alone and doesn't like to cook, cooking for one with out food spoiling  Angel says he wants to be well, avoid needing additional meds, avoid health problems.   He sees a Chiropractor, a massage person, has a student in the nutrition industry, and has had lots of advice on what to eat for health. Not all is evidence based information.   ~ was told to have more protein to cut back on carbs  \"Everybody agrees that eating habits were quite random and more convenience based. I want to get on a routine that will keep me healthy and flexible\"     NUTRITION HISTORY:  Doing \"Factor\" meal-delivery now- feels it is healthier compared to frozen dinners at grocery. He eats one of these entrees for evening meal 4 days/week.  Has a sweet tooth, avoiding citrus with GERD.    Breakfast: oatmeal, whole grain cheerios- or per recall   Dinner: Factor meal 4 times per week: Italian Herb Chicken or Garlic Herb [place] or Balsamic Glazed Chicken (with risotto and brussels sprouts or roasted cauliflower or roasted tomatoesor green green beans in past 3 days)- lots of chicken and salmon: doesn't have to think about cooking, portions, ingredients  ~ these have 550-660 rosalino each 36-43 g protein/meal and 22-57 g fat, 4-5 g fiber  Snacks: HS snack is now a shake powder (packet has 150 rosalino & 17g protein)   Beverages: skim Milk /day if has cereal and Water /day    Misses meals? no  Eats out:  Not assessed, but reports much less or no fast food now    Previous diet education:  No     Food allergies/intolerances: citrus with GERD    Diet is high in: fat (saturated), fat " "(unsaturated) and protein and some \"empty calorie\" dark chocolate or pastry  Diet is low in: fiber and vegetables    EXERCISE: he is working on this, joint pain so less intense, but does walk    SOCIO/ECONOMIC:   Lives with: self    MEDICATIONS:  Current Outpatient Medications   Medication     aspirin (ASA) 81 MG EC tablet     ASTAXANTHIN PO     Calcium Carbonate-Vitamin D (CALCIUM 600/VITAMIN D PO)     Cholecalciferol (VITAMIN D3) 1.25 MG (75852 UT) TABS     Glucos-Chondroit-Hyaluron-MSM (GLUCOSAMINE CHONDROITIN JOINT PO)     latanoprost (XALATAN) 0.005 % ophthalmic solution     Multiple Vitamins-Minerals (ICAPS AREDS 2 PO)     Omega-3 Fatty Acids (FISH OIL PO)     rosuvastatin (CRESTOR) 5 MG tablet     UNABLE TO FIND     No current facility-administered medications for this visit.       LABS:  Last Basic Metabolic Panel:  Lab Results   Component Value Date     06/09/2022      Lab Results   Component Value Date    POTASSIUM 5.0 06/09/2022     Lab Results   Component Value Date    CHLORIDE 99 06/09/2022     Lab Results   Component Value Date    LAURA 9.3 06/09/2022     No results found for: CO2  Lab Results   Component Value Date    BUN 14 06/09/2022    BUN 15 06/09/2022     Lab Results   Component Value Date    CR 0.95 06/09/2022     Lab Results   Component Value Date    GLC 87 06/09/2022       ANTHROPOMETRICS:  Vitals: There were no vitals taken for this visit.  There is no height or weight on file to calculate BMI.      Wt Readings from Last 5 Encounters:   06/16/22 89 kg (196 lb 4 oz)   03/10/22 89.3 kg (196 lb 12.8 oz)   02/08/22 88.5 kg (195 lb)   12/16/21 88.7 kg (195 lb 9.6 oz)   06/14/21 86.4 kg (190 lb 6.4 oz)       Weight Change: stable    ESTIMATED KCAL REQUIREMENTS:  Not estimated today  NUTRITION DIAGNOSIS: Food- and nutrition-related knowledge deficit related to need for evidence based information as evidenced by questions asked, dietary changes made    NUTRITION INTERVENTION:  Nutrition " Prescription: Protein Intake: 70-90g/day (0.8 - 1.0g/kg) grams/day (he says he has been told to aim for twice that)  Education given to support: general nutrition guidelines, fat modification, fiber and heart healthy diet    Discussed many topics in details but take away messages:  ~ choose lower fat when possible, for instance the salmon dinner had 57g fat, the egg yolks, meats, & some sweets contribute  ~ aim for high fiber foods: whole grains like his oatmeal or other high fiber cereals, fruits and veg (choose 5-9 produce) servings/day)  ~ if have the frozen entree for dinner, don't have a protein shake that day- he will choose 1 or the other  ~ encouraged plant-foods for heart health, to avoid HTN, for anti-inflammatory and cancer-protective properties      PATIENT'S BEHAVIOR CHANGE GOALS:   ~ choose either the prepared entree or the protein shake, not both on same day  ~ aim for 5-9 produce servings/day    MONITOR / EVALUATE:  RD will monitor/evaluate:  Beliefs and attitudes related to food  Food / Beverage / Nutrient intake   Pertinent Labs    FOLLOW-UP:  Follow up with RD as needed.    Yamila Gonzalez RD, LD, CDCES    Time spent in minutes: 44  Encounter: Individual

## 2022-08-15 ENCOUNTER — TRANSFERRED RECORDS (OUTPATIENT)
Dept: FAMILY MEDICINE | Facility: CLINIC | Age: 67
End: 2022-08-15

## 2022-08-25 ENCOUNTER — OFFICE VISIT (OUTPATIENT)
Dept: FAMILY MEDICINE | Facility: CLINIC | Age: 67
End: 2022-08-25

## 2022-08-25 VITALS
SYSTOLIC BLOOD PRESSURE: 165 MMHG | WEIGHT: 199.6 LBS | HEIGHT: 72 IN | RESPIRATION RATE: 16 BRPM | BODY MASS INDEX: 27.04 KG/M2 | OXYGEN SATURATION: 97 % | HEART RATE: 91 BPM | DIASTOLIC BLOOD PRESSURE: 91 MMHG

## 2022-08-25 DIAGNOSIS — R22.1 LUMP IN NECK: ICD-10-CM

## 2022-08-25 DIAGNOSIS — R26.89 BALANCE PROBLEMS: ICD-10-CM

## 2022-08-25 DIAGNOSIS — I10 ESSENTIAL HYPERTENSION: Primary | ICD-10-CM

## 2022-08-25 PROCEDURE — 99214 OFFICE O/P EST MOD 30 MIN: CPT | Performed by: FAMILY MEDICINE

## 2022-08-25 RX ORDER — LISINOPRIL 5 MG/1
5 TABLET ORAL DAILY
Qty: 90 TABLET | Refills: 3 | Status: SHIPPED | OUTPATIENT
Start: 2022-08-25 | End: 2023-06-19

## 2022-08-25 ASSESSMENT — ANXIETY QUESTIONNAIRES
6. BECOMING EASILY ANNOYED OR IRRITABLE: NOT AT ALL
7. FEELING AFRAID AS IF SOMETHING AWFUL MIGHT HAPPEN: SEVERAL DAYS
GAD7 TOTAL SCORE: 3
GAD7 TOTAL SCORE: 3
3. WORRYING TOO MUCH ABOUT DIFFERENT THINGS: NOT AT ALL
IF YOU CHECKED OFF ANY PROBLEMS ON THIS QUESTIONNAIRE, HOW DIFFICULT HAVE THESE PROBLEMS MADE IT FOR YOU TO DO YOUR WORK, TAKE CARE OF THINGS AT HOME, OR GET ALONG WITH OTHER PEOPLE: NOT DIFFICULT AT ALL
2. NOT BEING ABLE TO STOP OR CONTROL WORRYING: SEVERAL DAYS
1. FEELING NERVOUS, ANXIOUS, OR ON EDGE: SEVERAL DAYS
5. BEING SO RESTLESS THAT IT IS HARD TO SIT STILL: NOT AT ALL

## 2022-08-25 ASSESSMENT — PATIENT HEALTH QUESTIONNAIRE - PHQ9
SUM OF ALL RESPONSES TO PHQ QUESTIONS 1-9: 5
5. POOR APPETITE OR OVEREATING: NOT AT ALL

## 2022-08-25 NOTE — PROGRESS NOTES
Answers for HPI/ROS submitted by the patient on 8/24/2022  How many servings of fruits and vegetables do you eat daily?: 4 or more  How many minutes a day do you exercise enough to make your heart beat faster?: 20 to 29  How many days a week do you exercise enough to make your heart beat faster?: 5  How many days per week do you miss taking your medication?: 0  What is the reason for your visit today?: Dentist recommended getting very small swelling in neck checked out.  Also, ongoing balance decline.  What are your symptoms?: No symptoms for neck.  Slight off balance feeling at times.  How would you describe these symptoms?: Mild  Are your symptoms:: Staying the same  Have you had these symptoms before?: Yes  Is there anything that makes you feel better?: Sleep, but that s harder to achieve as I age.    Dentist found a small lump in the right neck    Still waking 90 minutes prior regular wake up, feels like this is the main issue....    A few episodes of minor vertigo  Epply maneuver doing some good.    Problem(s) Oriented visit      ROS:  General and Resp. completed and negative except as noted above     HISTORY:   reports current alcohol use.   reports that he has never smoked. He has never used smokeless tobacco.    Past Medical History:   Diagnosis Date     Abnormal stress test 10/28/2019     Adenomatous polyp of colon 2007     Chest pain      Mitral valve disorder 11/24/2014     Past Surgical History:   Procedure Laterality Date     CATARACT IOL, RT/LT  2004, 2009    Cataract IOL RT/LT     retinal detachment  2005       EXAM:  BP: 165/91   Pulse: 91    Temp: Data Unavailable    Wt Readings from Last 2 Encounters:   08/25/22 90.5 kg (199 lb 9.6 oz)   06/16/22 89 kg (196 lb 4 oz)       BMI= Body mass index is 27.07 kg/m .    EXAM:  APPEARANCE: = Relaxed and in no distress  Conj/Eyelids = noninjected and lids and lashes are without inflammation  PERRLA/Irises = Pupils Round Reactive to Light and Irisis without  inflammation  Neck = No anterior or posterior adenopathy appreciated.  Thyroid = Not enlarged and no masses felt  Resp effort = Calm regular breathing  Breath Sounds = Good air movement with no rales or rhonchi in any lung fields  Heart Rate, Rythym, & sounds (no Murm)  = Regular rate and rythym with no S3, S4, or murmer appreciated.  Carotid Art's = Pulses full and equal and no bruits appreciated  Abdomen = Soft, nontender, no masses, & bowel sounds in all quadrants  Liver/Spleen = Normal span and no splenomegaly noted  Digits and Nails = FROM in all finger joints, no nail dystrophy  Ext (edema) = No pretibial edema noted or elsewhere  Musculsktl =  Strength and ROM of major joints are within normal limits  SKIN = absent significant rashes or lesions   Recent/Remote Memory = Alert and Oriented x 3  Mood/Affect = Cooperative and interested      Assessment/Plan:  Waqar was seen today for mass.    Diagnoses and all orders for this visit:    Essential hypertension  -     lisinopril (ZESTRIL) 5 MG tablet; Take 1 tablet (5 mg) by mouth daily  Reviewed his current HTN management. Discussed our goal for him is a systolic pressure at or below 128 and diastolic pressure at or below 83.  We today managed his prescriptions with refills ensured to ensure availabilty of current medications.  Discussed the importance for aggressive management of HTN to prevent vascular complications later.  Recommended lower fat, lower carbohydrate, and lower sodium (<2000 mg)diet. Required intervals for follow up on HTN, lab studies reviewed.    Strongly recommened he follow his blood pressures outside the clinic to ensure that BPs are remaining within guidelines,.  Instructed to contact me if the readings are not within guidelines on a regular basis so we can adjust treatment as needed.      Lump in neck  dental small lesion not found by me, recheck here in 4 months.  Balance problems  Exercises reviewed.      COUNSELING:   reports that he has  never smoked. He has never used smokeless tobacco.    Estimated body mass index is 27.07 kg/m  as calculated from the following:    Height as of this encounter: 1.829 m (6').    Weight as of this encounter: 90.5 kg (199 lb 9.6 oz).   Weight management plan: Discussed healthy diet and exercise guidelines    Appropriate preventive services were discussed with this patient, including applicable screening as appropriate for cardiovascular disease, diabetes, osteopenia/osteoporosis, and glaucoma.  As appropriate for age/gender, discussed screening for colorectal cancer, prostate cancer, breast cancer, and cervical cancer. Checklist reviewing preventive services available has been given to the patient.    Reviewed patients plan of care and provided an AVS. The Basic Care Plan (routine screening as documented in Health Maintenance) for Waqar meets the Care Plan requirement. This Care Plan has been established and reviewed with the  Patient.      The following health maintenance items are reviewed in Epic and correct as of today:  Health Maintenance   Topic Date Due     INFLUENZA VACCINE (1) 09/01/2022     PHQ-9  02/25/2023     MEDICARE ANNUAL WELLNESS VISIT  06/16/2023     Pneumococcal Vaccine: 65+ Years (2 - PCV) 06/16/2023     FALL RISK ASSESSMENT  08/25/2023     LIPID  06/09/2027     ADVANCE CARE PLANNING  06/16/2027     COLORECTAL CANCER SCREENING  08/15/2029     DTAP/TDAP/TD IMMUNIZATION (3 - Td or Tdap) 09/11/2030     HEPATITIS C SCREENING  Completed     DEPRESSION ACTION PLAN  Completed     ZOSTER IMMUNIZATION  Completed     AORTIC ANEURYSM SCREENING (SYSTEM ASSIGNED)  Completed     COVID-19 Vaccine  Completed     IPV IMMUNIZATION  Aged Out     MENINGITIS IMMUNIZATION  Aged Out     HEPATITIS B IMMUNIZATION  Aged Out       Surinder Catherine  [facility]  For any issues my office # is [phone]

## 2022-09-03 ENCOUNTER — HEALTH MAINTENANCE LETTER (OUTPATIENT)
Age: 67
End: 2022-09-03

## 2022-12-18 ASSESSMENT — ANXIETY QUESTIONNAIRES
1. FEELING NERVOUS, ANXIOUS, OR ON EDGE: NOT AT ALL
6. BECOMING EASILY ANNOYED OR IRRITABLE: NOT AT ALL
8. IF YOU CHECKED OFF ANY PROBLEMS, HOW DIFFICULT HAVE THESE MADE IT FOR YOU TO DO YOUR WORK, TAKE CARE OF THINGS AT HOME, OR GET ALONG WITH OTHER PEOPLE?: NOT DIFFICULT AT ALL
GAD7 TOTAL SCORE: 3
GAD7 TOTAL SCORE: 3
4. TROUBLE RELAXING: SEVERAL DAYS
5. BEING SO RESTLESS THAT IT IS HARD TO SIT STILL: NOT AT ALL
GAD7 TOTAL SCORE: 3
7. FEELING AFRAID AS IF SOMETHING AWFUL MIGHT HAPPEN: SEVERAL DAYS
IF YOU CHECKED OFF ANY PROBLEMS ON THIS QUESTIONNAIRE, HOW DIFFICULT HAVE THESE PROBLEMS MADE IT FOR YOU TO DO YOUR WORK, TAKE CARE OF THINGS AT HOME, OR GET ALONG WITH OTHER PEOPLE: NOT DIFFICULT AT ALL
7. FEELING AFRAID AS IF SOMETHING AWFUL MIGHT HAPPEN: SEVERAL DAYS
3. WORRYING TOO MUCH ABOUT DIFFERENT THINGS: SEVERAL DAYS
2. NOT BEING ABLE TO STOP OR CONTROL WORRYING: NOT AT ALL

## 2022-12-19 NOTE — PROGRESS NOTES
Answers for HPI/ROS submitted by the patient on 12/18/2022  ANDREA 7 TOTAL SCORE: 3  Do you check your blood pressure regularly outside of the clinic?: Yes  Are your blood pressures ever more than 140 on the top number (systolic) OR more than 90 on the bottom number (diastolic)? (For example, greater than 140/90): No  Are you following a low salt diet?: Yes  Depression/Anxiety: Anxiety  Anxiety since last: : better  Other associated symotome: : No  Significant life event: : No  Anxious:: No  Current substance use:: No    Essential Hypertension   Remains well controlled when checked out of clinic.   he has not experienced any significant side effects from medications for hypertension.    NO active cardiac complaints or symptoms with exercise.  Current medications for treatment:  ACE inhibitors (Lisinopril, Ramipril,Captopril,Benazepril)    Reviewed last 6 BP readings in chart:  BP Readings from Last 6 Encounters:   12/20/22 125/70   08/25/22 (!) 165/91   06/16/22 128/78   03/10/22 135/83   02/08/22 (!) 174/98   12/16/21 (!) 155/92       Skin check on face....  occ dry air cough....      Problem(s) Oriented visit      ROS:  General and Resp. completed and negative except as noted above     HISTORY:   reports current alcohol use.   reports that he has never smoked. He has never used smokeless tobacco.    Past Medical History:   Diagnosis Date     Abnormal stress test 10/28/2019     Adenomatous polyp of colon 2007     Chest pain      Mitral valve disorder 11/24/2014     Past Surgical History:   Procedure Laterality Date     CATARACT IOL, RT/LT  2004, 2009    Cataract IOL RT/LT     retinal detachment  2005       EXAM:  BP: 125/70   Pulse: 84    Temp: Data Unavailable    Wt Readings from Last 2 Encounters:   12/20/22 90 kg (198 lb 6.4 oz)   08/25/22 90.5 kg (199 lb 9.6 oz)       BMI= Body mass index is 26.91 kg/m .    EXAM:  APPEARANCE: = Relaxed and in no distress      Assessment/Plan:  Waqar was seen today for  hypertension.    Diagnoses and all orders for this visit:    Essential hypertension  -     Basic Metabolic Panel (8) ([facility])    Reviewed his current HTN management. Discussed our goal for him is a systolic pressure at or below 128 and diastolic pressure at or below 83.  We today managed his prescriptions with refills ensured to ensure availabilty of current medications.  Discussed the importance for aggressive management of HTN to prevent vascular complications later.  Recommended lower fat, lower carbohydrate, and lower sodium (<2000 mg)diet. Required intervals for follow up on HTN, lab studies reviewed.    Strongly recommened he follow his blood pressures outside the clinic to ensure that BPs are remaining within guidelines,.  Instructed to contact me if the readings are not within guidelines on a regular basis so we can adjust treatment as needed.      COUNSELING:   reports that he has never smoked. He has never used smokeless tobacco.    Estimated body mass index is 26.91 kg/m  as calculated from the following:    Height as of this encounter: 1.829 m (6').    Weight as of this encounter: 90 kg (198 lb 6.4 oz).       Appropriate preventive services were discussed with this patient, including applicable screening as appropriate for cardiovascular disease, diabetes, osteopenia/osteoporosis, and glaucoma.  As appropriate for age/gender, discussed screening for colorectal cancer, prostate cancer, breast cancer, and cervical cancer. Checklist reviewing preventive services available has been given to the patient.    Reviewed patients plan of care and provided an AVS. The Basic Care Plan (routine screening as documented in Health Maintenance) for Waqar meets the Care Plan requirement. This Care Plan has been established and reviewed with the  Patient.      The following health maintenance items are reviewed in Epic and correct as of today:  Health Maintenance   Topic Date Due     PHQ-9  02/25/2023     MEDICARE ANNUAL  WELLNESS VISIT  06/16/2023     Pneumococcal Vaccine: 65+ Years (2 - PCV) 06/16/2023     FALL RISK ASSESSMENT  08/25/2023     LIPID  06/09/2027     ADVANCE CARE PLANNING  06/16/2027     COLORECTAL CANCER SCREENING  08/15/2029     DTAP/TDAP/TD IMMUNIZATION (3 - Td or Tdap) 09/11/2030     HEPATITIS C SCREENING  Completed     DEPRESSION ACTION PLAN  Completed     INFLUENZA VACCINE  Completed     ZOSTER IMMUNIZATION  Completed     AORTIC ANEURYSM SCREENING (SYSTEM ASSIGNED)  Completed     COVID-19 Vaccine  Completed     IPV IMMUNIZATION  Aged Out     MENINGITIS IMMUNIZATION  Aged Out       Surinder Catherine  [facility]  For any issues my office # is [phone]

## 2022-12-20 ENCOUNTER — OFFICE VISIT (OUTPATIENT)
Dept: FAMILY MEDICINE | Facility: CLINIC | Age: 67
End: 2022-12-20

## 2022-12-20 VITALS
RESPIRATION RATE: 16 BRPM | HEIGHT: 72 IN | DIASTOLIC BLOOD PRESSURE: 70 MMHG | HEART RATE: 84 BPM | WEIGHT: 198.4 LBS | OXYGEN SATURATION: 99 % | BODY MASS INDEX: 26.87 KG/M2 | SYSTOLIC BLOOD PRESSURE: 125 MMHG

## 2022-12-20 DIAGNOSIS — I10 ESSENTIAL HYPERTENSION: Primary | ICD-10-CM

## 2022-12-20 PROCEDURE — 99213 OFFICE O/P EST LOW 20 MIN: CPT | Performed by: FAMILY MEDICINE

## 2022-12-20 PROCEDURE — 36415 COLL VENOUS BLD VENIPUNCTURE: CPT | Performed by: FAMILY MEDICINE

## 2022-12-21 LAB
BUN SERPL-MCNC: 15 MG/DL (ref 8–27)
BUN/CREATININE RATIO: 15 (ref 10–24)
CALCIUM SERPL-MCNC: 9.1 MG/DL (ref 8.6–10.2)
CHLORIDE SERPLBLD-SCNC: 103 MMOL/L (ref 96–106)
CREAT SERPL-MCNC: 1.03 MG/DL (ref 0.76–1.27)
EGFR: 60 ML/MIN/1.73
GLUCOSE SERPL-MCNC: 101 MG/DL (ref 70–99)
POTASSIUM SERPL-SCNC: ABNORMAL MMOL/L
SODIUM SERPL-SCNC: 139 MMOL/L (ref 134–144)
TOTAL CO2: 22 MMOL/L (ref 20–29)

## 2022-12-29 NOTE — RESULT ENCOUNTER NOTE
Dear Waqar,     I am writing to report that your included test results are as expected.    Many labs contain some results that are slightly outside of the normal range, I have reviewed any of these results and they require no changes at this time.    Surinder Catherine MD

## 2023-05-22 DIAGNOSIS — Z12.5 SCREENING FOR PROSTATE CANCER: ICD-10-CM

## 2023-05-22 DIAGNOSIS — I10 ESSENTIAL HYPERTENSION: Primary | ICD-10-CM

## 2023-05-22 DIAGNOSIS — E78.00 HYPERCHOLESTEREMIA: ICD-10-CM

## 2023-06-12 DIAGNOSIS — E78.00 HYPERCHOLESTEREMIA: ICD-10-CM

## 2023-06-12 DIAGNOSIS — Z12.5 SCREENING FOR PROSTATE CANCER: ICD-10-CM

## 2023-06-12 DIAGNOSIS — I10 ESSENTIAL HYPERTENSION: ICD-10-CM

## 2023-06-12 LAB
% GRANULOCYTES: 69.8 % (ref 42.2–75.2)
HCT VFR BLD AUTO: 43.8 % (ref 39–51)
HEMOGLOBIN: 14.7 G/DL (ref 13.4–17.5)
LYMPHOCYTES NFR BLD AUTO: 23.4 % (ref 20.5–51.1)
MCH RBC QN AUTO: 30.6 PG (ref 27–31)
MCHC RBC AUTO-ENTMCNC: 33.7 G/DL (ref 33–37)
MCV RBC AUTO: 90.7 FL (ref 80–100)
MONOCYTES NFR BLD AUTO: 6.8 % (ref 1.7–9.3)
PLATELET # BLD AUTO: 224 K/UL (ref 140–450)
RBC # BLD AUTO: 4.82 X10/CMM (ref 4.2–5.9)
WBC # BLD AUTO: 5.4 X10/CMM (ref 3.8–11)

## 2023-06-12 PROCEDURE — 85025 COMPLETE CBC W/AUTO DIFF WBC: CPT | Performed by: FAMILY MEDICINE

## 2023-06-12 PROCEDURE — 36415 COLL VENOUS BLD VENIPUNCTURE: CPT | Performed by: FAMILY MEDICINE

## 2023-06-12 ASSESSMENT — ENCOUNTER SYMPTOMS
DIZZINESS: 0
SHORTNESS OF BREATH: 0
HEARTBURN: 0
JOINT SWELLING: 0
ARTHRALGIAS: 0
HEMATOCHEZIA: 0
PALPITATIONS: 0
ABDOMINAL PAIN: 0
HEADACHES: 0
HEMATURIA: 0
NERVOUS/ANXIOUS: 0
PARESTHESIAS: 0
DYSURIA: 0
CHILLS: 0
DIARRHEA: 0
FEVER: 0
MYALGIAS: 0
NAUSEA: 0
EYE PAIN: 0
CONSTIPATION: 0
COUGH: 0
WEAKNESS: 0
SORE THROAT: 0
FREQUENCY: 0

## 2023-06-12 ASSESSMENT — ACTIVITIES OF DAILY LIVING (ADL): CURRENT_FUNCTION: NO ASSISTANCE NEEDED

## 2023-06-13 LAB
ALBUMIN SERPL-MCNC: 4.9 G/DL (ref 3.8–4.8)
ALBUMIN/GLOB SERPL: 2 {RATIO} (ref 1.2–2.2)
ALP SERPL-CCNC: 41 IU/L (ref 44–121)
ALT SERPL-CCNC: 38 IU/L (ref 0–44)
AST SERPL-CCNC: 37 IU/L (ref 0–40)
BILIRUB SERPL-MCNC: 0.8 MG/DL (ref 0–1.2)
BUN SERPL-MCNC: 24 MG/DL (ref 8–27)
BUN/CREATININE RATIO: 24 (ref 10–24)
CALCIUM SERPL-MCNC: 9.6 MG/DL (ref 8.6–10.2)
CHLORIDE SERPLBLD-SCNC: 100 MMOL/L (ref 96–106)
CHOLEST SERPL-MCNC: 118 MG/DL (ref 100–199)
CREAT SERPL-MCNC: 0.99 MG/DL (ref 0.76–1.27)
EGFR: 83 ML/MIN/1.73
GLOBULIN, TOTAL: 2.5 G/DL (ref 1.5–4.5)
GLUCOSE SERPL-MCNC: 89 MG/DL (ref 70–99)
HDLC SERPL-MCNC: 58 MG/DL
LDL/HDL RATIO: 0.8 RATIO (ref 0–3.6)
LDLC SERPL CALC-MCNC: 49 MG/DL (ref 0–99)
POTASSIUM SERPL-SCNC: 5.6 MMOL/L (ref 3.5–5.2)
PROT SERPL-MCNC: 7.4 G/DL (ref 6–8.5)
PSA NG/ML: 0.9 NG/ML (ref 0–4)
SODIUM SERPL-SCNC: 135 MMOL/L (ref 134–144)
TOTAL CO2: 24 MMOL/L (ref 20–29)
TRIGL SERPL-MCNC: 47 MG/DL (ref 0–149)
VLDLC SERPL CALC-MCNC: 11 MG/DL (ref 5–40)

## 2023-06-14 NOTE — RESULT ENCOUNTER NOTE
Dear Waqar,     I am writing to report that your included test results are as expected.  The potassium has to be elevated due to red blood cell breakage during blood draw as your kidney labs are otherwise perfect.  Many labs contain some results that are slightly outside of the normal range, I have reviewed any of these results and they require no changes at this time.    Surinder Catherine MD

## 2023-06-15 NOTE — PATIENT INSTRUCTIONS
Patient Education   Personalized Prevention Plan  You are due for the preventive services outlined below.  Your care team is available to assist you in scheduling these services.  If you have already completed any of these items, please share that information with your care team to update in your medical record.  Health Maintenance Due   Topic Date Due     COVID-19 Vaccine (6 - Moderna series) 01/07/2023     Pneumococcal Vaccine (2 - PCV) 06/16/2023

## 2023-06-15 NOTE — PROGRESS NOTES
"SUBJECTIVE:   Waqar Ariza is a 67 year old male who presents for Preventive Visit.    Here to follow up on   Anxiety, works with [employer] and going well..  High potassium   HTN  High cholesterol.    Patient has been advised of split billing requirements and indicates understanding: Yes  Are you in the first 12 months of your Medicare Part B coverage?  No    Physical Health:  Answers for HPI/ROS submitted by the patient on 6/12/2023  In general, how would you rate your overall physical health?: good  Frequency of exercise:: 6-7 days/week  Do you usually eat at least 4 servings of fruit and vegetables a day, include whole grains & fiber, and avoid regularly eating high fat or \"junk\" foods? : Yes  Taking medications regularly:: Yes  Medication side effects:: None  Activities of Daily Living: no assistance needed  Home safety: no safety concerns identified  Hearing Impairment:: difficulty following a conversation in a noisy restaurant or crowded room  In the past 6 months, have you been bothered by leaking of urine?: No  abdominal pain: No  Blood in stool: No  Blood in urine: No  chest pain: No  chills: No  congestion: No  constipation: No  cough: No  diarrhea: No  dizziness: No  ear pain: No  eye pain: No  nervous/anxious: No  fever: No  frequency: No  genital sores: No  headaches: No  hearing loss: Yes  heartburn: No  arthralgias: No  joint swelling: No  peripheral edema: No  mood changes: No  myalgias: No  nausea: No  dysuria: No  palpitations: No  Skin sensation changes: No  sore throat: No  urgency: Yes  rash: No  shortness of breath: No  visual disturbance: No  weakness: No  impotence: No  penile discharge: No  In general, how would you rate your overall mental or emotional health?: good  Duration of exercise:: 30-45 minutes  HEARING FREQUENCY:   Right Ear:     500 Hz :  pass   1000 Hz:  pass   2000 Hz:  pass   4000 Hz:  pass  Left Ear:     500 Hz :  pass   1000 Hz:  pass   2000 Hz:  pass   4000 Hz:  " pass  Sylvia Brarhiaume, [facility] 2023        Mental Health:    In general, how would you rate your overall mental or emotional health? good  PHQ-2 Score:      Do you feel safe in your environment? Yes    Have you ever done Advance Care Planning? (For example, a Health Directive, POLST, or a discussion with a medical provider or your loved ones about your wishes): Yes, patient states has an Advance Care Planning document and will bring a copy to the clinic.    Additional concerns to address?  No    Fall risk:  Fallen 2 or more times in the past year?: No  Any fall with injury in the past year?: No    Cognitive Screenin) Repeat 3 items (Leader, Season, Table)    2) Clock draw: NORMAL  3) 3 item recall: Recalls 3 objects  Results: 3 items recalled: COGNITIVE IMPAIRMENT LESS LIKELY    Mini-CogTM Copyright S Manju. Licensed by the author for use in [facility]; reprinted with permission (soob@[facility]). All rights reserved.      Do you have sleep apnea, excessive snoring or daytime drowsiness?: no        PROBLEMS TO ADD ON...      Reviewed and updated as needed this visit by clinical staff    Allergies  Meds              Reviewed and updated as needed this visit by Provider                 Social History     Tobacco Use     Smoking status: Never     Smokeless tobacco: Never   Vaping Use     Vaping status: Not on file   Substance Use Topics     Alcohol use: Yes     Comment: special occasions only             2023     9:13 AM   Alcohol Use   Prescreen: >3 drinks/day or >7 drinks/week? Not Applicable     Do you have a current opioid prescription? No  Do you use any other controlled substances or medications that are not prescribed by a provider? None                      Current providers sharing in care for this patient include:   Patient Care Team:  Surinder Catherine MD as PCP - General (Family Practice)  Surinder Catherine MD as Assigned PCP  Yamila Gonzalez RD as Diabetes Educator  "(Dietitian, Registered)    The following health maintenance items are reviewed in Epic and correct as of today:  Health Maintenance   Topic Date Due     COVID-19 Vaccine (6 - Moderna series) 01/07/2023     PHQ-9  08/07/2023     MEDICARE ANNUAL WELLNESS VISIT  06/19/2024     FALL RISK ASSESSMENT  06/19/2024     LIPID  06/12/2028     ADVANCE CARE PLANNING  06/19/2028     COLORECTAL CANCER SCREENING  08/15/2029     DTAP/TDAP/TD IMMUNIZATION (3 - Td or Tdap) 09/11/2030     HEPATITIS C SCREENING  Completed     DEPRESSION ACTION PLAN  Completed     INFLUENZA VACCINE  Completed     Pneumococcal Vaccine: 65+ Years  Completed     ZOSTER IMMUNIZATION  Completed     AORTIC ANEURYSM SCREENING (SYSTEM ASSIGNED)  Completed     IPV IMMUNIZATION  Aged Out     MENINGITIS IMMUNIZATION  Aged Out     Labs reviewed in EPIC        ROS:  Constitutional, HEENT, cardiovascular, pulmonary, GI, , musculoskeletal, neuro, skin, endocrine and psych systems are negative, except as otherwise noted.    OBJECTIVE:   /75   Pulse 79   Ht 1.816 m (5' 11.5\")   Wt 88.4 kg (194 lb 12.8 oz)   SpO2 99%   BMI 26.79 kg/m   Estimated body mass index is 26.79 kg/m  as calculated from the following:    Height as of this encounter: 1.816 m (5' 11.5\").    Weight as of this encounter: 88.4 kg (194 lb 12.8 oz).  EXAM:   GENERAL: healthy, alert and no distress  EYES: Eyes grossly normal to inspection, PERRL and conjunctivae and sclerae normal  HENT: ear canals and TM's normal, nose and mouth without ulcers or lesions  NECK: no adenopathy, no asymmetry, masses, or scars and thyroid normal to palpation  RESP: lungs clear to auscultation - no rales, rhonchi or wheezes  CV: regular rate and rhythm, normal S1 S2, no S3 or S4, no murmur, click or rub, no peripheral edema and peripheral pulses strong  ABDOMEN: soft, nontender, no hepatosplenomegaly, no masses and bowel sounds normal  MS: no gross musculoskeletal defects noted, no edema  SKIN: no suspicious " lesions or rashes  NEURO: Normal strength and tone, mentation intact and speech normal  PSYCH: mentation appears normal, affect normal/bright    Diagnostic Test Results:  Labs reviewed in Epic    ASSESSMENT / PLAN:   Waqar was seen today for physical and hearing screening.    Diagnoses and all orders for this visit:    Encounter for Medicare annual wellness exam  -     VENOUS COLLECTION    Coronary artery disease involving native coronary artery of native heart without angina pectoris  No symptoms tolerating statin  -     rosuvastatin (CRESTOR) 5 MG tablet; Take 1 tablet (5 mg) by mouth every 48 hours    Essential hypertension  Reviewed his current HTN management. Discussed our goal for him is a systolic pressure at or below 128 and diastolic pressure at or below 83.  We today managed his prescriptions with refills ensured to ensure availabilty of current medications.  Discussed the importance for aggressive management of HTN to prevent vascular complications later.  Recommended lower fat, lower carbohydrate, and lower sodium (<2000 mg)diet. Required intervals for follow up on HTN, lab studies reviewed.    Strongly recommened he follow his blood pressures outside the clinic to ensure that BPs are remaining within guidelines,.  Instructed to contact me if the readings are not within guidelines on a regular basis so we can adjust treatment as needed.    -     lisinopril (ZESTRIL) 5 MG tablet; Take 1 tablet (5 mg) by mouth daily    Hyperkalemia  Likely rbc sheering  -     Basic Metabolic Panel (8) ([facility])    Chronic anxiety  Doing great with ongoing counseling as needed.    Need for vaccination  -     VACCINE ADMINISTRATION, INITIAL  -     PNEUMOCOCCAL 20 VALENT CONJUGATE (PREVNAR 20)        Patient has been advised of split billing requirements and indicates understanding: Yes    COUNSELING:  Reviewed preventive health counseling, as reflected in patient instructions       Regular exercise       Healthy  "diet/nutrition    Estimated body mass index is 26.79 kg/m  as calculated from the following:    Height as of this encounter: 1.816 m (5' 11.5\").    Weight as of this encounter: 88.4 kg (194 lb 12.8 oz).        He reports that he has never smoked. He has never used smokeless tobacco.      I have reviewed Opioid Use Disorder and Substance Use Disorder risk factors and made any needed referrals.     Appropriate preventive services were discussed with this patient, including applicable screening as appropriate for cardiovascular disease, diabetes, osteopenia/osteoporosis, and glaucoma.  As appropriate for age/gender, discussed screening for colorectal cancer, prostate cancer, breast cancer, and cervical cancer. Checklist reviewing preventive services available has been given to the patient.    Reviewed patients plan of care and provided an AVS. The Basic Care Plan (routine screening as documented in Health Maintenance) for Waaqr meets the Care Plan requirement. This Care Plan has been established and reviewed with the Patient.    Counseling Resources:  ATP IV Guidelines  Pooled Cohorts Equation Calculator  Breast Cancer Risk Calculator  BRCA-Related Cancer Risk Assessment: FHS-7 Tool  FRAX Risk Assessment  ICSI Preventive Guidelines  Dietary Guidelines for Americans, 2010  USDA's MyPlate  ASA Prophylaxis  Lung CA Screening    Surinder Catherine MD  [facility]  "

## 2023-06-19 ENCOUNTER — OFFICE VISIT (OUTPATIENT)
Dept: FAMILY MEDICINE | Facility: CLINIC | Age: 68
End: 2023-06-19

## 2023-06-19 VITALS
SYSTOLIC BLOOD PRESSURE: 126 MMHG | OXYGEN SATURATION: 99 % | BODY MASS INDEX: 26.38 KG/M2 | DIASTOLIC BLOOD PRESSURE: 75 MMHG | HEART RATE: 79 BPM | WEIGHT: 194.8 LBS | HEIGHT: 72 IN

## 2023-06-19 DIAGNOSIS — E87.5 HYPERKALEMIA: ICD-10-CM

## 2023-06-19 DIAGNOSIS — I25.10 CORONARY ARTERY DISEASE INVOLVING NATIVE CORONARY ARTERY OF NATIVE HEART WITHOUT ANGINA PECTORIS: ICD-10-CM

## 2023-06-19 DIAGNOSIS — Z23 NEED FOR VACCINATION: ICD-10-CM

## 2023-06-19 DIAGNOSIS — Z00.00 ENCOUNTER FOR MEDICARE ANNUAL WELLNESS EXAM: Primary | ICD-10-CM

## 2023-06-19 DIAGNOSIS — I10 ESSENTIAL HYPERTENSION: ICD-10-CM

## 2023-06-19 DIAGNOSIS — F33.42 MAJOR DEPRESSIVE DISORDER, RECURRENT EPISODE, IN FULL REMISSION (H): ICD-10-CM

## 2023-06-19 PROCEDURE — G0009 ADMIN PNEUMOCOCCAL VACCINE: HCPCS | Mod: 59 | Performed by: FAMILY MEDICINE

## 2023-06-19 PROCEDURE — G0438 PPPS, INITIAL VISIT: HCPCS | Performed by: FAMILY MEDICINE

## 2023-06-19 PROCEDURE — 36415 COLL VENOUS BLD VENIPUNCTURE: CPT | Performed by: FAMILY MEDICINE

## 2023-06-19 PROCEDURE — 99214 OFFICE O/P EST MOD 30 MIN: CPT | Mod: 25 | Performed by: FAMILY MEDICINE

## 2023-06-19 PROCEDURE — 90677 PCV20 VACCINE IM: CPT | Performed by: FAMILY MEDICINE

## 2023-06-19 RX ORDER — LISINOPRIL 5 MG/1
5 TABLET ORAL DAILY
Qty: 90 TABLET | Refills: 3 | Status: SHIPPED | OUTPATIENT
Start: 2023-06-19 | End: 2024-04-29

## 2023-06-19 RX ORDER — ROSUVASTATIN CALCIUM 5 MG/1
5 TABLET, COATED ORAL
Qty: 90 TABLET | Refills: 3 | Status: SHIPPED | OUTPATIENT
Start: 2023-06-19 | End: 2024-04-29

## 2023-06-20 LAB
BUN SERPL-MCNC: 26 MG/DL (ref 8–27)
BUN/CREATININE RATIO: 27 (ref 10–24)
CALCIUM SERPL-MCNC: 9.6 MG/DL (ref 8.6–10.2)
CHLORIDE SERPLBLD-SCNC: 95 MMOL/L (ref 96–106)
CREAT SERPL-MCNC: 0.97 MG/DL (ref 0.76–1.27)
EGFR: 86 ML/MIN/1.73
GLUCOSE SERPL-MCNC: 90 MG/DL (ref 70–99)
POTASSIUM SERPL-SCNC: 4.4 MMOL/L (ref 3.5–5.2)
SODIUM SERPL-SCNC: 133 MMOL/L (ref 134–144)
TOTAL CO2: 22 MMOL/L (ref 20–29)

## 2023-11-30 ENCOUNTER — PATIENT OUTREACH (OUTPATIENT)
Dept: GASTROENTEROLOGY | Facility: CLINIC | Age: 68
End: 2023-11-30
Payer: MEDICARE

## 2024-02-20 ENCOUNTER — TRANSFERRED RECORDS (OUTPATIENT)
Dept: FAMILY MEDICINE | Facility: CLINIC | Age: 69
End: 2024-02-20

## 2024-04-03 ENCOUNTER — OFFICE VISIT (OUTPATIENT)
Dept: FAMILY MEDICINE | Facility: CLINIC | Age: 69
End: 2024-04-03

## 2024-04-03 VITALS
HEIGHT: 73 IN | DIASTOLIC BLOOD PRESSURE: 75 MMHG | SYSTOLIC BLOOD PRESSURE: 141 MMHG | BODY MASS INDEX: 24.92 KG/M2 | HEART RATE: 88 BPM | WEIGHT: 188 LBS | OXYGEN SATURATION: 99 %

## 2024-04-03 DIAGNOSIS — K40.90 NON-RECURRENT UNILATERAL INGUINAL HERNIA WITHOUT OBSTRUCTION OR GANGRENE: ICD-10-CM

## 2024-04-03 DIAGNOSIS — R39.9 LOWER URINARY TRACT SYMPTOMS (LUTS): Primary | ICD-10-CM

## 2024-04-03 LAB
BACTERIA (RMG): NORMAL
BILIRUBIN (RMG): NEGATIVE
BLOOD (RMG): NEGATIVE
CASTS (RMG): NORMAL
COLOR UR: YELLOW
CRYSTAL (RMG): NORMAL
EPITHELIAL (RMG): NORMAL
GLUCOSE (RMG): NEGATIVE
KETONE (RMG): NEGATIVE
LEUKOCYTE (RMG): NEGATIVE
MUCOUS (RMG): NORMAL
NITRITE (RMG): NEGATIVE
PH UR STRIP: 5.5 PH (ref 5–7)
PROTEIN (RMG): NEGATIVE
RBC (RMG): NORMAL
SP GR UR STRIP: 1.01
UROBILINOGEN (RMG): 0.2
WBC (RMG): NORMAL

## 2024-04-03 PROCEDURE — 81003 URINALYSIS AUTO W/O SCOPE: CPT | Performed by: FAMILY MEDICINE

## 2024-04-03 PROCEDURE — 99213 OFFICE O/P EST LOW 20 MIN: CPT | Performed by: FAMILY MEDICINE

## 2024-04-03 NOTE — PROGRESS NOTES
"Answers submitted by the patient for this visit:  General Questionnaire (Submitted on 4/2/2024)  Chief Complaint: Chronic problems general questions HPI Form  How many servings of fruits and vegetables do you eat daily?: 2-3  On average, how many sweetened beverages do you drink each day (Examples: soda, juice, sweet tea, etc.  Do NOT count diet or artificially sweetened beverages)?: 0  How many minutes a day do you exercise enough to make your heart beat faster?: 30 to 60  How many days a week do you exercise enough to make your heart beat faster?: 7  How many days per week do you miss taking your medication?: 0  General Concern (Submitted on 4/2/2024)  Chief Complaint: Chronic problems general questions HPI Form  What is the reason for your visit today?: Uncomfortable groin issue pulled muscle or hernia?  When did your symptoms begin?: 1-2 weeks ago  What are your symptoms?: Soreness inner thigh, left side abdomen  How would you describe these symptoms?: Mild  Are your symptoms:: Staying the same  Have you had these symptoms before?: Yes  Have you tried or received treatment for these symptoms before?: No  Is there anything that makes you feel worse?: Too much physical exertion - placed an cold pack on the area once for 10 minutes which was a mistake  Is there anything that makes you feel better?: Ibuprofen seems to help a lot  Getting groin pain in left side especially when lifting or pulling.  Tends to go away if stops lifting.    ROS:  General and Resp. completed and negative except as noted above    Histories: reviewed    OBJECTIVE:                                                    BP (!) 141/75   Pulse 88   Ht 1.842 m (6' 0.5\")   Wt 85.3 kg (188 lb)   SpO2 99%   BMI 25.15 kg/m    Body mass index is 25.15 kg/m .   APPEARANCE: = Relaxed and in no distress   =  hernia large left hernia     ASSESSMENT/PLAN:                                                    Quality gaps reviewed    Angel was seen today for " groin pain and uti.    Diagnoses and all orders for this visit:    Lower urinary tract symptoms (LUTS)  -     Urinalysis ([facility])    Non-recurrent unilateral inguinal hernia without obstruction or gangrene  -     Adult General Surg Referral; Future        See Patient Instructions    Health maintenance items are reviewed in Epic and correct as of today:    Surinder Catherine MD  [facility]  Family Practice  [facility]  [phone]    For any issues my office # is [phone]

## 2024-04-04 ENCOUNTER — TELEPHONE (OUTPATIENT)
Dept: SURGERY | Facility: CLINIC | Age: 69
End: 2024-04-04

## 2024-04-04 ENCOUNTER — OFFICE VISIT (OUTPATIENT)
Dept: SURGERY | Facility: CLINIC | Age: 69
End: 2024-04-04
Attending: FAMILY MEDICINE
Payer: MEDICARE

## 2024-04-04 VITALS
RESPIRATION RATE: 16 BRPM | HEART RATE: 88 BPM | SYSTOLIC BLOOD PRESSURE: 118 MMHG | HEIGHT: 72 IN | WEIGHT: 188 LBS | DIASTOLIC BLOOD PRESSURE: 72 MMHG | BODY MASS INDEX: 25.47 KG/M2 | OXYGEN SATURATION: 95 %

## 2024-04-04 DIAGNOSIS — K40.90 NON-RECURRENT UNILATERAL INGUINAL HERNIA WITHOUT OBSTRUCTION OR GANGRENE: ICD-10-CM

## 2024-04-04 DIAGNOSIS — K40.20 NON-RECURRENT BILATERAL INGUINAL HERNIA WITHOUT OBSTRUCTION OR GANGRENE: Primary | ICD-10-CM

## 2024-04-04 PROCEDURE — 99203 OFFICE O/P NEW LOW 30 MIN: CPT | Performed by: SURGERY

## 2024-04-04 NOTE — LETTER
April 4, 2024          Surinder Catherine MD  [address] [place] [address]      RE:   Waqar Ariza 1955      Dear Colleague,    Thank you for referring your patient, Waqar Ariza, to [facility] at [facility]. Please see a copy of my visit note below.    Patient is a pleasant 68-year-old gentleman referred by primary care for evaluation of left inguinal hernia.  He states that he feels that hernia has actually been present for some time.  It has in the recent past started to cause some more mild discomfort.  No signs or symptoms of incarceration or strangulation.  No GI or bowel obstruction symptoms.     PMH:   has a past medical history of Abnormal stress test (10/28/2019), Adenomatous polyp of colon (2007), Chest pain, and Mitral valve disorder (11/24/2014).  PSH:    has a past surgical history that includes cataract iol, rt/lt (2004, 2009) and retinal detachment (2005).  Social History:   reports that he has never smoked. He has never used smokeless tobacco. He reports current alcohol use. He reports that he does not use drugs.  Family History:  family history includes Hypertension in his mother; No Known Problems in his brother, sister, and sister.  Medications/Allergies: Home medications and allergies reviewed.     ROS:  The 10 point Review of Systems is negative other than noted in the HPI.     Physical Exam:  /72   Pulse 88   Resp 16   Ht 1.829 m (6')   Wt 85.3 kg (188 lb)   SpO2 95%   BMI 25.50 kg/m    GENERAL: Generally appears well.  Psych: Alert and Oriented.  Normal affect  Eyes: Sclera clear  Respiratory:  Lungs clear to ausculation bilaterally with good air excursion  Cardiovascular:  Regular Rate and Rhythm with no murmurs gallops or rubs, normal peripheral pulses  GI: Abdomen Non Distended Soft Non-Tender  No hernias palpated..  Groin- I examined the patient in both the standing and supine positions. Right Groin- Small inguinal hernia.  Hernia  was easily reduciable. Left Groin- Moderate sized inguinal hernia.  Hernia was easily reduciable. No scrotal or testicle abnormalities.  Lymphatic/Hematologic/Immune:  No femoral or cervical lymphadenopathy.  Integumentary:  No rashes  Neurological: grossly intact      All new lab and imaging data was reviewed.      Impression and Plan:  Patient is a 68 year old male with bilateral inguinal hernia     PLAN: Recommend robotic assisted laparoscopic repair at his convenience  I discussed the pathophysiology of hernias and options for repair including laparoscopic VS open.  The risks associated with the procedure including, but not limited to, recurrence, nerve entrapment or injury, persistence of pain, injury to the bowel/bladder, infertility, hematoma, mesh migration, mesh infection, MI, and PE were discussed with the patient. He indicated understanding of the discussion, asked appropriate questions, and provided consent. Signs and symptoms of incarceration were discussed. If these develop in the interim, he promises to call or go straight to the ER. I have provided the patient with an information pamphlet.    Again, thank you for allowing me to participate in the care of your patient.      Sincerely,      César Jonas MD

## 2024-04-04 NOTE — PROGRESS NOTES
[facility]  Surgery Consultation    CONSULTATION REQUESTED BY:  FlorinJohnnieivan Mchugh [phone]    HPI: Patient is a pleasant 68-year-old gentleman referred by primary care for evaluation of left inguinal hernia.  He states that he feels that hernia has actually been present for some time.  It has in the recent past started to cause some more mild discomfort.  No signs or symptoms of incarceration or strangulation.  No GI or bowel obstruction symptoms.    PMH:   has a past medical history of Abnormal stress test (10/28/2019), Adenomatous polyp of colon (2007), Chest pain, and Mitral valve disorder (11/24/2014).  PSH:    has a past surgical history that includes cataract iol, rt/lt (2004, 2009) and retinal detachment (2005).  Social History:   reports that he has never smoked. He has never used smokeless tobacco. He reports current alcohol use. He reports that he does not use drugs.  Family History:  family history includes Hypertension in his mother; No Known Problems in his brother, sister, and sister.  Medications/Allergies: Home medications and allergies reviewed.    ROS:  The 10 point Review of Systems is negative other than noted in the HPI.    Physical Exam:  /72   Pulse 88   Resp 16   Ht 1.829 m (6')   Wt 85.3 kg (188 lb)   SpO2 95%   BMI 25.50 kg/m    GENERAL: Generally appears well.  Psych: Alert and Oriented.  Normal affect  Eyes: Sclera clear  Respiratory:  Lungs clear to ausculation bilaterally with good air excursion  Cardiovascular:  Regular Rate and Rhythm with no murmurs gallops or rubs, normal peripheral pulses  GI: Abdomen Non Distended Soft Non-Tender  No hernias palpated..  Groin- I examined the patient in both the standing and supine positions. Right Groin- Small inguinal hernia.  Hernia was easily reduciable. Left Groin- Moderate sized inguinal hernia.  Hernia was easily reduciable. No scrotal or testicle abnormalities.  Lymphatic/Hematologic/Immune:  No femoral  or cervical lymphadenopathy.  Integumentary:  No rashes  Neurological: grossly intact     All new lab and imaging data was reviewed.     Impression and Plan:  Patient is a 68 year old male with bilateral inguinal hernia    PLAN: Recommend robotic assisted laparoscopic repair at his convenience  I discussed the pathophysiology of hernias and options for repair including laparoscopic VS open.  The risks associated with the procedure including, but not limited to, recurrence, nerve entrapment or injury, persistence of pain, injury to the bowel/bladder, infertility, hematoma, mesh migration, mesh infection, MI, and PE were discussed with the patient. He indicated understanding of the discussion, asked appropriate questions, and provided consent. Signs and symptoms of incarceration were discussed. If these develop in the interim, he promises to call or go straight to the ER. I have provided the patient with an information pamphlet.    Thank you very much for this consult.    César Jonas M.D.  [facility]  [phone]    Please route or send letter to:  Primary Care Provider (PCP) and Referring Provider

## 2024-04-04 NOTE — TELEPHONE ENCOUNTER
Type of surgery: Robotic assisted laparoscopic bilateral inguinal hernia repair  Location of surgery: [facility]  Date and time of surgery: 5/13/24 at 7:30am  Surgeon: Dr. César Jonas  Pre-Op Appt Date: patient to schedule  Post-Op Appt Date: patient to schedule   Packet sent out: Yes  Pre-cert/Authorization completed:  Not Applicable  Date: 4/4/24

## 2024-04-04 NOTE — TELEPHONE ENCOUNTER
Orders received for robotic hernia repair with Dr. César Jonas.       Left message for patient to call me at their convenience to schedule surgery.     Anna GARCIA    Surgery Coordinator  [facility]  [phone]

## 2024-04-25 NOTE — PROGRESS NOTES
Answers submitted by the patient for this visit:  Patient Health Questionnaire (Submitted on 4/28/2024)  If you checked off any problems, how difficult have these problems made it for you to do your work, take care of things at home, or get along with other people?: Not difficult at all  PHQ9 TOTAL SCORE: 1  Preoperative Evaluation  [facility] [address] [place] [address]  Phone: [phone]  Fax: [phone]  Primary Provider: Tamara Catherine  Pre-op Performing Provider: TAMARA CATHERINE  Apr 29, 2024     Angel is a 68 year old, presenting for the following:  Pre-Op Exam (Surgery/Procedure: HERNIORRHAPHY, INGUINAL, ROBOT-ASSISTED, LAPAROSCOPIC, USING DA JACKELIN XI /Surgery Location: [facility]/Surgeon: César Jonas MD /Surgery Date: 5/13/24/Time of Surgery: 0700)      Surgical Information  Surgery/Procedure: HERNIORRHAPHY, INGUINAL, ROBOT-ASSISTED, LAPAROSCOPIC, USING DA JACKELIN XI   Surgery Location: [facility]  Surgeon: César Jonas MD   Surgery Date: 5/13/24  Time of Surgery: 0700  Where patient plans to recover: At home with family  Fax number for surgical facility: Note does not need to be faxed, will be available electronically in Epic.    Assessment & Plan     The proposed surgical procedure is considered INTERMEDIATE risk.    Preop general physical exam  Prior to repair of Bilateral recurrent inguinal hernia without obstruction or gangrene    Essential hypertension  Well controlled    Coronary artery disease involving native coronary artery of native heart without angina pectoris  On statin for calcium score at 35%    Major depressive disorder, recurrent episode, in full remission (H24)  Well controlled.       - No identified additional risk factors other than previously addressed    Antiplatelet or Anticoagulation Medication Instructions   - Patient is on no antiplatelet or anticoagulation medications.    Additional  Medication Instructions  Patient is to take all scheduled medications on the day of surgery    Recommendation  APPROVAL GIVEN to proceed with proposed procedure, without further diagnostic evaluation.          Subjective       HPI related to upcoming procedure: Bilateral Inginual Hernias        4/23/2024     8:17 AM   Preop Questions   1. Have you ever had a heart attack or stroke? No   2. Have you ever had surgery on your heart or blood vessels, such as a stent placement, a coronary artery bypass, or surgery on an artery in your head, neck, heart, or legs? No   3. Do you have chest pain with activity? No   4. Do you have a history of  heart failure? No   5. Do you currently have a cold, bronchitis or symptoms of other infection? No   6. Do you have a cough, shortness of breath, or wheezing? No   7. Do you or anyone in your family have previous history of blood clots? No   8. Do you or does anyone in your family have a serious bleeding problem such as prolonged bleeding following surgeries or cuts? No   9. Have you ever had problems with anemia or been told to take iron pills? No   10. Have you had any abnormal blood loss such as black, tarry or bloody stools? No   11. Have you ever had a blood transfusion? No   12. Are you willing to have a blood transfusion if it is medically needed before, during, or after your surgery? Yes   13. Have you or any of your relatives ever had problems with anesthesia? YES - pt dad gets nausea    14. Do you have sleep apnea, excessive snoring or daytime drowsiness? No   15. Do you have any artifical heart valves or other implanted medical devices like a pacemaker, defibrillator, or continuous glucose monitor? No   16. Do you have artificial joints? No   17. Are you allergic to latex? No       Health Care Directive  Patient does not have a Health Care Directive or Living Will: As is reasonable care for most folks, In the short term, he wants usual aggressive medical care.   No desire  for long term prolongation of life through artificial means if no hope to bring back to a reasonable status.      Preoperative Review of    reviewed - no record of controlled substances prescribed.      Status of Chronic Conditions:  See problem list for active medical problems.  Problems all longstanding and stable, except as noted/documented.  See ROS for pertinent symptoms related to these conditions.    Patient Active Problem List    Diagnosis Date Noted    Current moderate episode of major depressive disorder without prior episode (H) 04/29/2024     Priority: Medium    Major depressive disorder, recurrent episode, in full remission (H24) 06/19/2023     Priority: Medium    Primary osteoarthritis of both knees 06/14/2021     Priority: Medium    CAD (coronary artery disease) 01/07/2020     Priority: Medium    Nonrheumatic mitral valve regurgitation 11/05/2019     Priority: Medium    Advanced directives, counseling/discussion 07/26/2012     Priority: Medium     As is reasonable care for Most of us, wants in the Short term aggressive care.   No desire for long term prolongation of life through artificial means if no hope to bring back to a reasonable status.         Adenomatous polyp of colon 07/29/2011     Priority: Medium      Past Medical History:   Diagnosis Date    Abnormal stress test 10/28/2019    Adenomatous polyp of colon 2007    q 5 year colonoscopy    Chest pain     Mitral valve disorder 11/24/2014     Problem list name updated by automated process. Provider to review     Past Surgical History:   Procedure Laterality Date    CATARACT IOL, RT/LT  2004, 2009    Cataract IOL RT/LT    retinal detachment  2005     Current Outpatient Medications   Medication Sig Dispense Refill    ASTAXANTHIN PO Take by mouth daily      Glucos-Chondroit-Hyaluron-MSM (GLUCOSAMINE CHONDROITIN JOINT PO)       latanoprost (XALATAN) 0.005 % ophthalmic solution INSTILL 1 DROP INTO BOTH EYES EVERY DAY IN THE EVENING       lisinopril (ZESTRIL) 5 MG tablet Take 1 tablet (5 mg) by mouth daily 90 tablet 3    Multiple Vitamins-Minerals (ICAPS AREDS 2 PO) Take by mouth daily      Omega-3 Fatty Acids (FISH OIL PO) Take 1 tablet by mouth daily.      rosuvastatin (CRESTOR) 5 MG tablet Take 1 tablet (5 mg) by mouth every 48 hours 90 tablet 3    UNABLE TO FIND Take 1 tablet by mouth daily MEDICATION NAME: immune support supplement      Calcium Carbonate-Vitamin D (CALCIUM 600/VITAMIN D PO) Take 1 tablet by mouth daily. (Patient not taking: Reported on 4/4/2024)         No Known Allergies     Social History     Tobacco Use    Smoking status: Never    Smokeless tobacco: Never   Substance Use Topics    Alcohol use: Yes     Comment: special occasions only     Family History   Problem Relation Age of Onset    Hypertension Mother         older in 70s    No Known Problems Brother     No Known Problems Sister     No Known Problems Sister      History   Drug Use No         Review of Systems    Review of Systems  Constitutional, neuro, ENT, endocrine, pulmonary, cardiac, gastrointestinal, genitourinary, musculoskeletal, integument and psychiatric systems are negative, except as otherwise noted.    Objective    /77   Pulse 69   Temp (!) 96.6  F (35.9  C) (Oral)   Wt 84.7 kg (186 lb 12.8 oz)   SpO2 98%   BMI 25.33 kg/m     Estimated body mass index is 25.33 kg/m  as calculated from the following:    Height as of 4/4/24: 1.829 m (6').    Weight as of this encounter: 84.7 kg (186 lb 12.8 oz).  Physical Exam  GENERAL: alert and no distress  EYES: Eyes grossly normal to inspection, PERRL and conjunctivae and sclerae normal  HENT: ear canals and TM's normal, nose and mouth without ulcers or lesions  NECK: no adenopathy, no asymmetry, masses, or scars  RESP: lungs clear to auscultation - no rales, rhonchi or wheezes  CV: regular rate and rhythm, normal S1 S2, no S3 or S4, no murmur, click or rub, no peripheral edema  ABDOMEN: soft, nontender, no  hepatosplenomegaly, no masses and bowel sounds normal  MS: no gross musculoskeletal defects noted, no edema  SKIN: no suspicious lesions or rashes  NEURO: Normal strength and tone, mentation intact and speech normal  PSYCH: mentation appears normal, affect normal/bright    Recent Labs   Lab Test 06/19/23  0836 06/12/23  0820   HGB  --  14.7   PLT  --  224   * 135   POTASSIUM 4.4 5.6*   CR 0.97 0.99        Diagnostics  Labs pending at this time.  Results will be reviewed when available.   No EKG required for low risk surgery (cataract, skin procedure, breast biopsy, etc).    Revised Cardiac Risk Index (RCRI)  The patient has the following serious cardiovascular risks for perioperative complications:   - No serious cardiac risks = 0 points     RCRI Interpretation: 0 points: Class I (very low risk - 0.4% complication rate)         Signed Electronically by: Surinder Catherine MD  Copy of this evaluation report is provided to requesting physician.

## 2024-04-25 NOTE — PATIENT INSTRUCTIONS
Preparing for Your Surgery  Getting started  A nurse will call you to review your health history and instructions. They will give you an arrival time based on your scheduled surgery time. Please be ready to share:  Your doctor's clinic name and phone number  Your medical, surgical, and anesthesia history  A list of allergies and sensitivities  A list of medicines, including herbal treatments and over-the-counter drugs  Whether the patient has a legal guardian (ask how to send us the papers in advance)  Please tell us if you're pregnant--or if there's any chance you might be pregnant. Some surgeries may injure a fetus (unborn baby), so they require a pregnancy test. Surgeries that are safe for a fetus don't always need a test, and you can choose whether to have one.   If you have a child who's having surgery, please ask for a copy of Preparing for Your Child's Surgery.    Preparing for surgery  Within 10 to 30 days of surgery: Have a pre-op exam (sometimes called an H&P, or History and Physical). This can be done at a clinic or pre-operative center.  If you're having a , you may not need this exam. Talk to your care team.  At your pre-op exam, talk to your care team about all medicines you take. If you need to stop any medicines before surgery, ask when to start taking them again.  We do this for your safety. Many medicines can make you bleed too much during surgery. Some change how well surgery (anesthesia) drugs work.  Call your insurance company to let them know you're having surgery. (If you don't have insurance, call [phone].)  Call your clinic if there's any change in your health. This includes signs of a cold or flu (sore throat, runny nose, cough, rash, fever). It also includes a scrape or scratch near the surgery site.  If you have questions on the day of surgery, call your hospital or surgery center.  Eating and drinking guidelines  For your safety: Unless your surgeon tells you otherwise,  follow the guidelines below.  Eat and drink as usual until 8 hours before you arrive for surgery. After that, no food or milk.  Drink clear liquids until 2 hours before you arrive. These are liquids you can see through, like water, Gatorade, and Propel Water. They also include plain black coffee and tea (no cream or milk), candy, and breath mints. You can spit out gum when you arrive.  If you drink alcohol: Stop drinking it the night before surgery.  If your care team tells you to take medicine on the morning of surgery, it's okay to take it with a sip of water.  Preventing infection  Shower or bathe the night before and morning of your surgery. Follow the instructions your clinic gave you. (If no instructions, use regular soap.)  Don't shave or clip hair near your surgery site. We'll remove the hair if needed.  Don't smoke or vape the morning of surgery. You may chew nicotine gum up to 2 hours before surgery. A nicotine patch is okay.  Note: Some surgeries require you to completely quit smoking and nicotine. Check with your surgeon.  Your care team will make every effort to keep you safe from infection. We will:  Clean our hands often with soap and water (or an alcohol-based hand rub).  Clean the skin at your surgery site with a special soap that kills germs.  Give you a special gown to keep you warm. (Cold raises the risk of infection.)  Wear special hair covers, masks, gowns and gloves during surgery.  Give antibiotic medicine, if prescribed. Not all surgeries need antibiotics.  What to bring on the day of surgery  Photo ID and insurance card  Copy of your health care directive, if you have one  Glasses and hearing aids (bring cases)  You can't wear contacts during surgery  Inhaler and eye drops, if you use them (tell us about these when you arrive)  CPAP machine or breathing device, if you use them  A few personal items, if spending the night  If you have . . .  A pacemaker, ICD (cardiac defibrillator) or other  implant: Bring the ID card.  An implanted stimulator: Bring the remote control.  A legal guardian: Bring a copy of the certified (court-stamped) guardianship papers.  Please remove any jewelry, including body piercings. Leave jewelry and other valuables at home.  If you're going home the day of surgery  You must have a responsible adult drive you home. They should stay with you overnight as well.  If you don't have someone to stay with you, and you aren't safe to go home alone, we may keep you overnight. Insurance often won't pay for this.  After surgery  If it's hard to control your pain or you need more pain medicine, please call your surgeon's office.  Questions?   If you have any questions for your care team, list them here: _________________________________________________________________________________________________________________________________________________________________________ ____________________________________ ____________________________________ ____________________________________  For informational purposes only. Not to replace the advice of your health care provider. Copyright   2003, 2019 [employer] [facility]. All rights reserved. Clinically reviewed by Sindhu Guevara MD. [employer]ART[employer] 167180 - REV 12/22.    How to Take Your Medication Before Surgery  - Take all of your medications before surgery except as noted below  - STOP taking all vitamins and herbal supplements 14 days before surgery.

## 2024-04-28 ASSESSMENT — PATIENT HEALTH QUESTIONNAIRE - PHQ9
SUM OF ALL RESPONSES TO PHQ QUESTIONS 1-9: 1
10. IF YOU CHECKED OFF ANY PROBLEMS, HOW DIFFICULT HAVE THESE PROBLEMS MADE IT FOR YOU TO DO YOUR WORK, TAKE CARE OF THINGS AT HOME, OR GET ALONG WITH OTHER PEOPLE: NOT DIFFICULT AT ALL
SUM OF ALL RESPONSES TO PHQ QUESTIONS 1-9: 1

## 2024-04-29 ENCOUNTER — OFFICE VISIT (OUTPATIENT)
Dept: FAMILY MEDICINE | Facility: CLINIC | Age: 69
End: 2024-04-29

## 2024-04-29 VITALS
OXYGEN SATURATION: 98 % | TEMPERATURE: 96.6 F | HEART RATE: 69 BPM | DIASTOLIC BLOOD PRESSURE: 77 MMHG | BODY MASS INDEX: 25.33 KG/M2 | WEIGHT: 186.8 LBS | SYSTOLIC BLOOD PRESSURE: 130 MMHG

## 2024-04-29 DIAGNOSIS — F33.42 MAJOR DEPRESSIVE DISORDER, RECURRENT EPISODE, IN FULL REMISSION (H): ICD-10-CM

## 2024-04-29 DIAGNOSIS — Z01.818 PREOP GENERAL PHYSICAL EXAM: Primary | ICD-10-CM

## 2024-04-29 DIAGNOSIS — I25.10 CORONARY ARTERY DISEASE INVOLVING NATIVE CORONARY ARTERY OF NATIVE HEART WITHOUT ANGINA PECTORIS: ICD-10-CM

## 2024-04-29 DIAGNOSIS — K40.21 BILATERAL RECURRENT INGUINAL HERNIA WITHOUT OBSTRUCTION OR GANGRENE: ICD-10-CM

## 2024-04-29 DIAGNOSIS — I10 ESSENTIAL HYPERTENSION: ICD-10-CM

## 2024-04-29 PROBLEM — F32.1 CURRENT MODERATE EPISODE OF MAJOR DEPRESSIVE DISORDER WITHOUT PRIOR EPISODE (H): Status: ACTIVE | Noted: 2024-04-29

## 2024-04-29 PROCEDURE — 99214 OFFICE O/P EST MOD 30 MIN: CPT | Performed by: FAMILY MEDICINE

## 2024-04-29 RX ORDER — LISINOPRIL 5 MG/1
5 TABLET ORAL DAILY
Qty: 90 TABLET | Refills: 3 | Status: SHIPPED | OUTPATIENT
Start: 2024-04-29

## 2024-04-29 RX ORDER — ROSUVASTATIN CALCIUM 5 MG/1
5 TABLET, COATED ORAL
Qty: 90 TABLET | Refills: 3 | Status: SHIPPED | OUTPATIENT
Start: 2024-04-29

## 2024-05-12 ENCOUNTER — ANESTHESIA EVENT (OUTPATIENT)
Dept: SURGERY | Facility: CLINIC | Age: 69
End: 2024-05-12
Payer: MEDICARE

## 2024-05-13 ENCOUNTER — APPOINTMENT (OUTPATIENT)
Dept: SURGERY | Facility: PHYSICIAN GROUP | Age: 69
End: 2024-05-13
Payer: MEDICARE

## 2024-05-13 ENCOUNTER — HOSPITAL ENCOUNTER (OUTPATIENT)
Facility: CLINIC | Age: 69
Discharge: HOME | End: 2024-05-13
Attending: SURGERY | Admitting: SURGERY
Payer: MEDICARE

## 2024-05-13 ENCOUNTER — ANESTHESIA (OUTPATIENT)
Dept: SURGERY | Facility: CLINIC | Age: 69
End: 2024-05-13
Payer: MEDICARE

## 2024-05-13 VITALS
DIASTOLIC BLOOD PRESSURE: 70 MMHG | RESPIRATION RATE: 16 BRPM | SYSTOLIC BLOOD PRESSURE: 132 MMHG | OXYGEN SATURATION: 100 % | BODY MASS INDEX: 25.07 KG/M2 | WEIGHT: 185.1 LBS | TEMPERATURE: 97 F | HEIGHT: 72 IN | HEART RATE: 73 BPM

## 2024-05-13 DIAGNOSIS — G89.18 POSTOPERATIVE PAIN: Primary | ICD-10-CM

## 2024-05-13 PROCEDURE — 49650 LAP ING HERNIA REPAIR INIT: CPT | Performed by: NURSE ANESTHETIST, CERTIFIED REGISTERED

## 2024-05-13 PROCEDURE — 360N000080 HC SURGERY LEVEL 7, PER MIN: Performed by: SURGERY

## 2024-05-13 PROCEDURE — 258N000003 HC RX IP 258 OP 636: Performed by: ANESTHESIOLOGY

## 2024-05-13 PROCEDURE — 250N000011 HC RX IP 250 OP 636: Performed by: ANESTHESIOLOGY

## 2024-05-13 PROCEDURE — 272N000001 HC OR GENERAL SUPPLY STERILE: Performed by: SURGERY

## 2024-05-13 PROCEDURE — 250N000009 HC RX 250: Performed by: SURGERY

## 2024-05-13 PROCEDURE — 49650 LAP ING HERNIA REPAIR INIT: CPT | Mod: 50 | Performed by: SURGERY

## 2024-05-13 PROCEDURE — 49650 LAP ING HERNIA REPAIR INIT: CPT | Mod: AS | Performed by: PHYSICIAN ASSISTANT

## 2024-05-13 PROCEDURE — 250N000009 HC RX 250: Performed by: ANESTHESIOLOGY

## 2024-05-13 PROCEDURE — 370N000017 HC ANESTHESIA TECHNICAL FEE, PER MIN: Performed by: SURGERY

## 2024-05-13 PROCEDURE — C1781 MESH (IMPLANTABLE): HCPCS | Performed by: SURGERY

## 2024-05-13 PROCEDURE — 250N000011 HC RX IP 250 OP 636: Performed by: PHYSICIAN ASSISTANT

## 2024-05-13 PROCEDURE — 250N000011 HC RX IP 250 OP 636: Performed by: SURGERY

## 2024-05-13 PROCEDURE — 250N000013 HC RX MED GY IP 250 OP 250 PS 637: Performed by: PHYSICIAN ASSISTANT

## 2024-05-13 PROCEDURE — 710N000009 HC RECOVERY PHASE 1, LEVEL 1, PER MIN: Performed by: SURGERY

## 2024-05-13 PROCEDURE — 250N000025 HC SEVOFLURANE, PER MIN: Performed by: SURGERY

## 2024-05-13 PROCEDURE — 49650 LAP ING HERNIA REPAIR INIT: CPT | Performed by: ANESTHESIOLOGY

## 2024-05-13 PROCEDURE — 710N000012 HC RECOVERY PHASE 2, PER MINUTE: Performed by: SURGERY

## 2024-05-13 PROCEDURE — S2900 ROBOTIC SURGICAL SYSTEM: HCPCS | Performed by: SURGERY

## 2024-05-13 PROCEDURE — 999N000141 HC STATISTIC PRE-PROCEDURE NURSING ASSESSMENT: Performed by: SURGERY

## 2024-05-13 DEVICE — LAPAROSCOPIC SELF-FIXATING MESH POLYESTER WITH POLYLACTIC ACID GRIPS AND COLLAGEN FILM
Type: IMPLANTABLE DEVICE | Site: INGUINAL | Status: FUNCTIONAL
Brand: PROGRIP

## 2024-05-13 RX ORDER — ONDANSETRON 2 MG/ML
4 INJECTION INTRAMUSCULAR; INTRAVENOUS EVERY 30 MIN PRN
Status: DISCONTINUED | OUTPATIENT
Start: 2024-05-13 | End: 2024-05-13 | Stop reason: HOSPADM

## 2024-05-13 RX ORDER — DEXMEDETOMIDINE HYDROCHLORIDE 4 UG/ML
INJECTION, SOLUTION INTRAVENOUS PRN
Status: DISCONTINUED | OUTPATIENT
Start: 2024-05-13 | End: 2024-05-13

## 2024-05-13 RX ORDER — FENTANYL CITRATE 50 UG/ML
25 INJECTION, SOLUTION INTRAMUSCULAR; INTRAVENOUS EVERY 5 MIN PRN
Status: DISCONTINUED | OUTPATIENT
Start: 2024-05-13 | End: 2024-05-13 | Stop reason: HOSPADM

## 2024-05-13 RX ORDER — LIDOCAINE HYDROCHLORIDE 20 MG/ML
INJECTION, SOLUTION INFILTRATION; PERINEURAL PRN
Status: DISCONTINUED | OUTPATIENT
Start: 2024-05-13 | End: 2024-05-13

## 2024-05-13 RX ORDER — PROPOFOL 10 MG/ML
INJECTION, EMULSION INTRAVENOUS CONTINUOUS PRN
Status: DISCONTINUED | OUTPATIENT
Start: 2024-05-13 | End: 2024-05-13

## 2024-05-13 RX ORDER — CEFAZOLIN SODIUM/WATER 2 G/20 ML
2 SYRINGE (ML) INTRAVENOUS SEE ADMIN INSTRUCTIONS
Status: DISCONTINUED | OUTPATIENT
Start: 2024-05-13 | End: 2024-05-13 | Stop reason: HOSPADM

## 2024-05-13 RX ORDER — KETOROLAC TROMETHAMINE 30 MG/ML
30 INJECTION, SOLUTION INTRAMUSCULAR; INTRAVENOUS ONCE
Status: COMPLETED | OUTPATIENT
Start: 2024-05-13 | End: 2024-05-13

## 2024-05-13 RX ORDER — HYDROMORPHONE HCL IN WATER/PF 6 MG/30 ML
0.4 PATIENT CONTROLLED ANALGESIA SYRINGE INTRAVENOUS EVERY 5 MIN PRN
Status: DISCONTINUED | OUTPATIENT
Start: 2024-05-13 | End: 2024-05-13 | Stop reason: HOSPADM

## 2024-05-13 RX ORDER — SODIUM CHLORIDE, SODIUM LACTATE, POTASSIUM CHLORIDE, CALCIUM CHLORIDE 600; 310; 30; 20 MG/100ML; MG/100ML; MG/100ML; MG/100ML
INJECTION, SOLUTION INTRAVENOUS CONTINUOUS
Status: DISCONTINUED | OUTPATIENT
Start: 2024-05-13 | End: 2024-05-13 | Stop reason: HOSPADM

## 2024-05-13 RX ORDER — NALOXONE HYDROCHLORIDE 0.4 MG/ML
0.1 INJECTION, SOLUTION INTRAMUSCULAR; INTRAVENOUS; SUBCUTANEOUS
Status: DISCONTINUED | OUTPATIENT
Start: 2024-05-13 | End: 2024-05-13 | Stop reason: HOSPADM

## 2024-05-13 RX ORDER — HYDROCODONE BITARTRATE AND ACETAMINOPHEN 5; 325 MG/1; MG/1
1 TABLET ORAL
Status: COMPLETED | OUTPATIENT
Start: 2024-05-13 | End: 2024-05-13

## 2024-05-13 RX ORDER — BUPIVACAINE HYDROCHLORIDE AND EPINEPHRINE 5; 5 MG/ML; UG/ML
INJECTION, SOLUTION PERINEURAL PRN
Status: DISCONTINUED | OUTPATIENT
Start: 2024-05-13 | End: 2024-05-13 | Stop reason: HOSPADM

## 2024-05-13 RX ORDER — AMOXICILLIN 250 MG
1-2 CAPSULE ORAL 2 TIMES DAILY
Qty: 20 TABLET | Refills: 0 | Status: SHIPPED | OUTPATIENT
Start: 2024-05-13 | End: 2024-06-20

## 2024-05-13 RX ORDER — ONDANSETRON 4 MG/1
4 TABLET, ORALLY DISINTEGRATING ORAL EVERY 30 MIN PRN
Status: DISCONTINUED | OUTPATIENT
Start: 2024-05-13 | End: 2024-05-13 | Stop reason: HOSPADM

## 2024-05-13 RX ORDER — MAGNESIUM HYDROXIDE 1200 MG/15ML
LIQUID ORAL PRN
Status: DISCONTINUED | OUTPATIENT
Start: 2024-05-13 | End: 2024-05-13 | Stop reason: HOSPADM

## 2024-05-13 RX ORDER — HYDROMORPHONE HCL IN WATER/PF 6 MG/30 ML
0.2 PATIENT CONTROLLED ANALGESIA SYRINGE INTRAVENOUS EVERY 5 MIN PRN
Status: DISCONTINUED | OUTPATIENT
Start: 2024-05-13 | End: 2024-05-13 | Stop reason: HOSPADM

## 2024-05-13 RX ORDER — DEXAMETHASONE SODIUM PHOSPHATE 4 MG/ML
INJECTION, SOLUTION INTRA-ARTICULAR; INTRALESIONAL; INTRAMUSCULAR; INTRAVENOUS; SOFT TISSUE PRN
Status: DISCONTINUED | OUTPATIENT
Start: 2024-05-13 | End: 2024-05-13

## 2024-05-13 RX ORDER — FENTANYL CITRATE 50 UG/ML
INJECTION, SOLUTION INTRAMUSCULAR; INTRAVENOUS PRN
Status: DISCONTINUED | OUTPATIENT
Start: 2024-05-13 | End: 2024-05-13

## 2024-05-13 RX ORDER — MEPERIDINE HYDROCHLORIDE 25 MG/ML
12.5 INJECTION INTRAMUSCULAR; INTRAVENOUS; SUBCUTANEOUS EVERY 5 MIN PRN
Status: DISCONTINUED | OUTPATIENT
Start: 2024-05-13 | End: 2024-05-13 | Stop reason: HOSPADM

## 2024-05-13 RX ORDER — CEFAZOLIN SODIUM/WATER 2 G/20 ML
2 SYRINGE (ML) INTRAVENOUS
Status: COMPLETED | OUTPATIENT
Start: 2024-05-13 | End: 2024-05-13

## 2024-05-13 RX ORDER — SODIUM CHLORIDE, SODIUM LACTATE, POTASSIUM CHLORIDE, CALCIUM CHLORIDE 600; 310; 30; 20 MG/100ML; MG/100ML; MG/100ML; MG/100ML
INJECTION, SOLUTION INTRAVENOUS CONTINUOUS PRN
Status: DISCONTINUED | OUTPATIENT
Start: 2024-05-13 | End: 2024-05-13

## 2024-05-13 RX ORDER — FENTANYL CITRATE 50 UG/ML
50 INJECTION, SOLUTION INTRAMUSCULAR; INTRAVENOUS EVERY 5 MIN PRN
Status: DISCONTINUED | OUTPATIENT
Start: 2024-05-13 | End: 2024-05-13 | Stop reason: HOSPADM

## 2024-05-13 RX ORDER — HYDROCODONE BITARTRATE AND ACETAMINOPHEN 5; 325 MG/1; MG/1
1 TABLET ORAL EVERY 4 HOURS PRN
Qty: 10 TABLET | Refills: 0 | Status: SHIPPED | OUTPATIENT
Start: 2024-05-13 | End: 2024-06-20

## 2024-05-13 RX ORDER — DEXAMETHASONE SODIUM PHOSPHATE 4 MG/ML
4 INJECTION, SOLUTION INTRA-ARTICULAR; INTRALESIONAL; INTRAMUSCULAR; INTRAVENOUS; SOFT TISSUE
Status: DISCONTINUED | OUTPATIENT
Start: 2024-05-13 | End: 2024-05-13 | Stop reason: HOSPADM

## 2024-05-13 RX ORDER — ONDANSETRON 2 MG/ML
INJECTION INTRAMUSCULAR; INTRAVENOUS PRN
Status: DISCONTINUED | OUTPATIENT
Start: 2024-05-13 | End: 2024-05-13

## 2024-05-13 RX ORDER — PROPOFOL 10 MG/ML
INJECTION, EMULSION INTRAVENOUS PRN
Status: DISCONTINUED | OUTPATIENT
Start: 2024-05-13 | End: 2024-05-13

## 2024-05-13 RX ADMIN — FENTANYL CITRATE 50 MCG: 50 INJECTION, SOLUTION INTRAMUSCULAR; INTRAVENOUS at 09:43

## 2024-05-13 RX ADMIN — FENTANYL CITRATE 100 MCG: 50 INJECTION INTRAMUSCULAR; INTRAVENOUS at 07:40

## 2024-05-13 RX ADMIN — PHENYLEPHRINE HYDROCHLORIDE 100 MCG: 10 INJECTION INTRAVENOUS at 07:40

## 2024-05-13 RX ADMIN — FENTANYL CITRATE 25 MCG: 50 INJECTION, SOLUTION INTRAMUSCULAR; INTRAVENOUS at 09:22

## 2024-05-13 RX ADMIN — SODIUM CHLORIDE, POTASSIUM CHLORIDE, SODIUM LACTATE AND CALCIUM CHLORIDE: 600; 310; 30; 20 INJECTION, SOLUTION INTRAVENOUS at 08:43

## 2024-05-13 RX ADMIN — SODIUM CHLORIDE, POTASSIUM CHLORIDE, SODIUM LACTATE AND CALCIUM CHLORIDE: 600; 310; 30; 20 INJECTION, SOLUTION INTRAVENOUS at 07:34

## 2024-05-13 RX ADMIN — PROPOFOL 200 MG: 10 INJECTION, EMULSION INTRAVENOUS at 07:40

## 2024-05-13 RX ADMIN — PHENYLEPHRINE HYDROCHLORIDE 100 MCG: 10 INJECTION INTRAVENOUS at 07:53

## 2024-05-13 RX ADMIN — ROCURONIUM BROMIDE 50 MG: 50 INJECTION, SOLUTION INTRAVENOUS at 07:40

## 2024-05-13 RX ADMIN — ONDANSETRON 4 MG: 2 INJECTION INTRAMUSCULAR; INTRAVENOUS at 08:52

## 2024-05-13 RX ADMIN — LIDOCAINE HYDROCHLORIDE 80 MG: 20 INJECTION, SOLUTION INFILTRATION; PERINEURAL at 07:40

## 2024-05-13 RX ADMIN — PHENYLEPHRINE HYDROCHLORIDE 100 MCG: 10 INJECTION INTRAVENOUS at 08:12

## 2024-05-13 RX ADMIN — FENTANYL CITRATE 25 MCG: 50 INJECTION, SOLUTION INTRAMUSCULAR; INTRAVENOUS at 09:17

## 2024-05-13 RX ADMIN — PHENYLEPHRINE HYDROCHLORIDE 100 MCG: 10 INJECTION INTRAVENOUS at 07:46

## 2024-05-13 RX ADMIN — PROPOFOL 50 MCG/KG/MIN: 10 INJECTION, EMULSION INTRAVENOUS at 07:47

## 2024-05-13 RX ADMIN — KETOROLAC TROMETHAMINE 30 MG: 30 INJECTION, SOLUTION INTRAMUSCULAR; INTRAVENOUS at 09:48

## 2024-05-13 RX ADMIN — PHENYLEPHRINE HYDROCHLORIDE 100 MCG: 10 INJECTION INTRAVENOUS at 08:02

## 2024-05-13 RX ADMIN — DEXMEDETOMIDINE HYDROCHLORIDE 8 MCG: 200 INJECTION INTRAVENOUS at 08:45

## 2024-05-13 RX ADMIN — Medication 2 G: at 07:34

## 2024-05-13 RX ADMIN — SUGAMMADEX 200 MG: 100 INJECTION, SOLUTION INTRAVENOUS at 08:51

## 2024-05-13 RX ADMIN — DEXAMETHASONE SODIUM PHOSPHATE 4 MG: 4 INJECTION, SOLUTION INTRA-ARTICULAR; INTRALESIONAL; INTRAMUSCULAR; INTRAVENOUS; SOFT TISSUE at 07:49

## 2024-05-13 RX ADMIN — MIDAZOLAM 2 MG: 1 INJECTION INTRAMUSCULAR; INTRAVENOUS at 07:34

## 2024-05-13 RX ADMIN — HYDROCODONE BITARTRATE AND ACETAMINOPHEN 1 TABLET: 5; 325 TABLET ORAL at 10:10

## 2024-05-13 ASSESSMENT — ACTIVITIES OF DAILY LIVING (ADL)
ADLS_ACUITY_SCORE: 35

## 2024-05-13 ASSESSMENT — LIFESTYLE VARIABLES: TOBACCO_USE: 0

## 2024-05-13 ASSESSMENT — ENCOUNTER SYMPTOMS: SEIZURES: 0

## 2024-05-13 NOTE — ANESTHESIA CARE TRANSFER NOTE
Patient: Waqar Ariza    Procedure: Procedure(s):  HERNIORRHAPHY, INGUINAL, ROBOT-ASSISTED, LAPAROSCOPIC, USING DA JACKELIN XI       Diagnosis: Non-recurrent bilateral inguinal hernia without obstruction or gangrene [K40.20]  Diagnosis Additional Information: No value filed.    Anesthesia Type:   General     Note:    Oropharynx: oropharynx clear of all foreign objects and spontaneously breathing  Level of Consciousness: drowsy  Oxygen Supplementation: nasal cannula  Level of Supplemental Oxygen (L/min / FiO2): 2  Independent Airway: airway patency satisfactory and stable  Dentition: dentition unchanged  Vital Signs Stable: post-procedure vital signs reviewed and stable  Report to RN Given: handoff report given  Patient transferred to: PACU  Comments: Patient breathing spontaneously.  Follows commands.  Suctioned and extubated.  Exchanging air well.  Transferred to PACU with 2L O2 via NC.  Monitors on.  VSS.  Patent IV.  Report and transfer of care to RN.    Handoff Report: Identifed the Patient, Identified the Reponsible Provider, Reviewed the pertinent medical history, Discussed the surgical course, Reviewed Intra-OP anesthesia mangement and issues during anesthesia, Set expectations for post-procedure period and Allowed opportunity for questions and acknowledgement of understanding    Vitals:  Vitals Value Taken Time   /65 05/13/24 0915   Temp     Pulse 71 05/13/24 0916   Resp 13 05/13/24 0916   SpO2 100 % 05/13/24 0916   Vitals shown include unfiled device data.    Electronically Signed By: SOPHIA Zaman CRNA  May 13, 2024  9:18 AM

## 2024-05-13 NOTE — ANESTHESIA PREPROCEDURE EVALUATION
Anesthesia Pre-Procedure Evaluation    Patient: Waqar Ariza   MRN: [id number] : 1955        Procedure : Procedure(s):  HERNIORRHAPHY, INGUINAL, ROBOT-ASSISTED, LAPAROSCOPIC, USING DA JACKELIN XI          Past Medical History:   Diagnosis Date    Abnormal stress test 10/28/2019    Adenomatous polyp of colon 2007    q 5 year colonoscopy    Chest pain     Depressive disorder     Hypertension     Mitral valve disorder 2014     Problem list name updated by automated process. Provider to review    Vertigo     last episode was 2024      Past Surgical History:   Procedure Laterality Date    CATARACT IOL, RT/LT  ,     Cataract IOL RT/LT    retinal detachment        No Known Allergies   Social History     Tobacco Use    Smoking status: Never    Smokeless tobacco: Never   Substance Use Topics    Alcohol use: Yes     Comment: special occasions only      Wt Readings from Last 1 Encounters:   24 84 kg (185 lb 1.6 oz)        Anesthesia Evaluation   Pt has had prior anesthetic. Type: MAC.    No history of anesthetic complications       ROS/MED HX  ENT/Pulmonary:    (-) tobacco use, asthma and sleep apnea   Neurologic:    (-) no seizures and no CVA   Cardiovascular: Comment: Left Ventricle  The left ventricle is normal in size. There is normal left ventricular wall  thickness. A sigmoid septum is present. Left ventricular systolic function is  normal. The visual ejection fraction is estimated at 55-60%. Left ventricular  diastolic function is indeterminate. No regional wall motion abnormalities  noted.     Right Ventricle  The right ventricle is normal in structure, function and size.     Atria  Normal left atrial size. Right atrial size is normal.     Mitral Valve  Mild bilateral mitral valve leaflet prolapse. There is trace mitral  regurgitation.     Tricuspid Valve  The tricuspid valve is not well visualized, but is grossly normal. There is  trace tricuspid regurgitation. Right ventricular  systolic pressure could not  be approximated due to inadequate tricuspid regurgitation.        Aortic Valve  The aortic valve is trileaflet.     Pulmonic Valve  The pulmonic valve is not well seen, but is grossly normal.     Vessels  The aortic root is normal size. The inferior vena cava was normal in size with  preserved respiratory variability.     Pericardium  The pericardium appears normal.     _____________________________________________________________________________  __  MMode/2D Measurements & Calculations  IVSd: 0.70 cm  LVIDd: 4.9 cm  LVIDs: 2.7 cm  LVPWd: 0.86 cm  FS: 45.1 %  LV mass(C)d: 125.7 grams  LV mass(C)dI: 60.8 grams/m2  Ao root diam: 3.5 cm  LA dimension: 3.3 cm  asc Aorta Diam: 3.4 cm     LA/Ao: 0.95  LA Volume Index (BP): 27.5 ml/m2  RWT: 0.35        Doppler Measurements & Calculations  MV E max denisse: 67.4 cm/sec  MV A max denisse: 52.6 cm/sec  MV E/A: 1.3  MV dec slope: 407.3 cm/sec2  MV dec time: 0.17 sec  PA acc time: 0.14 sec               (+)  hypertension- -  CAD -  - -                           valvular problems/murmurs (no issues) type: MVP          METS/Exercise Tolerance:     Hematologic:       Musculoskeletal:       GI/Hepatic:     (+) GERD (rare with stress),                   Renal/Genitourinary:    (-) renal disease   Endo:    (-) Type II DM and thyroid disease   Psychiatric/Substance Use:       Infectious Disease:       Malignancy:       Other:            Physical Exam    Airway        Mallampati: II   TM distance: > 3 FB   Neck ROM: full   Mouth opening: > 3 cm    Respiratory Devices and Support         Dental       (+) Modest Abnormalities - crowns, retainers, 1 or 2 missing teeth      Cardiovascular   cardiovascular exam normal          Pulmonary   pulmonary exam normal                OUTSIDE LABS:  CBC:   Lab Results   Component Value Date    WBC 5.4 06/12/2023    WBC 6.6 05/31/2019    HGB 14.7 06/12/2023    HGB 13.5 05/31/2019    HCT 43.8 06/12/2023    HCT 41.3 05/31/2019     " 06/12/2023     05/31/2019     BMP:   Lab Results   Component Value Date     (L) 06/19/2023     06/12/2023    POTASSIUM 4.4 06/19/2023    POTASSIUM 5.6 (H) 06/12/2023    CHLORIDE 95 (L) 06/19/2023    CHLORIDE 100 06/12/2023    BUN 26 06/19/2023    BUN 27 (H) 06/19/2023    CR 0.97 06/19/2023    CR 0.99 06/12/2023    GLC 90 06/19/2023    GLC 89 06/12/2023     COAGS: No results found for: \"PTT\", \"INR\", \"FIBR\"  POC: No results found for: \"BGM\", \"HCG\", \"HCGS\"  HEPATIC:   Lab Results   Component Value Date    ALBUMIN 4.9 (H) 06/12/2023    PROTTOTAL 7.4 06/12/2023    ALT 38 06/12/2023    AST 37 06/12/2023    ALKPHOS 41 (L) 06/12/2023    BILITOTAL 0.8 06/12/2023     OTHER:   Lab Results   Component Value Date    LAURA 9.6 06/19/2023       Anesthesia Plan    ASA Status:  2       Anesthesia Type: General.     - Airway: ETT   Induction: Intravenous.   Maintenance: Balanced.        Consents    Anesthesia Plan(s) and associated risks, benefits, and realistic alternatives discussed. Questions answered and patient/representative(s) expressed understanding.     - Discussed:     - Discussed with:  Patient            Postoperative Care    Pain management: IV analgesics, Oral pain medications.   PONV prophylaxis: Ondansetron (or other 5HT-3), Dexamethasone or Solumedrol, Background Propofol Infusion     Comments:               Anna Dunham I have reviewed the pertinent notes and labs in the chart from the past 30 days and (re)examined the patient.  Any updates or changes from those notes are reflected in this note.              # Overweight: Estimated body mass index is 25.1 kg/m  as calculated from the following:    Height as of this encounter: 1.829 m (6').    Weight as of this encounter: 84 kg (185 lb 1.6 oz).      "

## 2024-05-13 NOTE — DISCHARGE INSTRUCTIONS
Today you received Toradol, an antiinflammatory medication similar to Ibuprofen.  You should not take other antiinflammatory medication, such as Ibuprofen, Motrin, Advil, Aleve, Naprosyn, etc until 2:00 PM.        Same Day Surgery Discharge Instructions for  Sedation and General Anesthesia     It's not unusual to feel dizzy, light-headed or faint for up to 24 hours after surgery or while taking pain medication.  If you have these symptoms: sit for a few minutes before standing and have someone assist you when you get up to walk or use the bathroom.    You should rest and relax for the next 24 hours. We recommend you make arrangements to have an adult stay with you for at least 24 hours after your discharge.  Avoid hazardous and strenuous activity.    DO NOT DRIVE any vehicle or operate mechanical equipment for 24 hours following the end of your surgery.  Even though you may feel normal, your reactions may be affected by the medication you have received.    Do not drink alcoholic beverages for 24 hours following surgery.     Slowly progress to your regular diet as you feel able. It's not unusual to feel nauseated and/or vomit after receiving anesthesia.  If you develop these symptoms, drink clear liquids (apple juice, ginger ale, broth, 7-up, etc. ) until you feel better.  If your nausea and vomiting persists for 24 hours, please notify your surgeon.      All narcotic pain medications, along with inactivity and anesthesia, can cause constipation. Drinking plenty of liquids and increasing fiber intake will help.    For any questions of a medical nature, call your surgeon.    Do not make important decisions for 24 hours.    If you had general anesthesia, you may have a sore throat for a couple of days related to the breathing tube used during surgery.  You may use Cepacol lozenges to help with this discomfort.  If it worsens or if you develop a fever, contact your surgeon.     If you feel your pain is not well managed  with the pain medications prescribed by your surgeon, please contact your surgeon's office to let them know so they can address your concerns.          [facility]  Discharge Instructions: Post-Operative Cholecystectomy    ACTIVITY  Expect to feel tired after your surgery.  This will gradually resolve.    Take frequent, short walks and increase your activity gradually.    Avoid strenuous physical activity or heavy lifting greater than 15-20 lbs. for 2-3 weeks.  You may climb stairs.  You may drive without restrictions when you are not using any prescription pain medication and feel comfortable in a car.  You may return to work/school when you are comfortable without any prescription pain medication.    WOUND CARE  You may remove your outer dressing or Band-Aids and shower 48 hours after the surgery.  Pat your incisions dry and leave them open to air.  Re-apply dressing (Band-Aids or gauze/tape) as needed for comfort or drainage.  You may have steri-strips (looks like white tape) on your incision.  You may peel off the steri-strips 2 weeks after your surgery if they have not peeled off on their own.  If you have skin glue, it will peel up and fall off on its own.  Do not soak your incisions in a tub or pool for 2 weeks.   Do not apply any lotions, creams, or ointments to your incisions.  A ridge under your incisions is normal and will gradually resolve.    DIET  Start with liquids, then gradually resume your regular diet as tolerated.  Avoid heavy, spicy, and greasy meals for 2-3 days.  Drink plenty of fluids to stay hydrated.  It is not uncommon to experience some loose stools or diarrhea after surgery.  This is your body's way of adapting to the bile which will slowly drain into your intestine.  A low fat diet may help with this.  This should improve over 1-2 months.    PAIN  Expect some tenderness and discomfort at the incision sites.  Use the prescribed pain medication at your  discretion.  Expect gradual resolution of your pain over several days.  You may take ibuprofen with food (unless you have been told not to) or acetaminophen/Tylenol instead of or in addition to your prescribed pain medication.  However, if you are taking Norco do not take any additional acetaminophen/Tylenol.  Do not drink alcohol or drive while you are taking pain medications.  You may apply ice to your incisions in 20 minute intervals as needed for the next 48 hours.  After that time, consider switching to heat if you prefer.    EXPECTATIONS  Pain medications can cause constipation.  Limit use when possible.  Take an over the counter or prescribed stool softener/stimulant, such as Colace or Senna, 1-2 times a day with plenty of water.  You may take a mild over the counter laxative, such as Miralax or a suppository, as needed.  You may discontinue these medications once you are having regular bowel movements and/or are no longer taking your narcotic pain medication.    You may have shoulder or upper back discomfort due to the gas used in surgery.  This is temporary and should resolve in 48-72 hours.  Short, frequent walks may help with this.  If you are unable to urinate for 8 hours or feel as though you are not emptying your bladder adequately, we recommend you seek care at an ER or Urgent Care facility for possible catheter placement.     FOLLOW UP  Our office will contact you in approximately 2-3 weeks to check on your progress and answer any questions you may have.  If you are doing well, you will not need to return for a follow up appointment.  If any concerns are identified over the phone, we will help you make an appointment to see a provider.   If you have not received a phone call, have any questions or concerns, or would like to be seen, please call us at [phone] and ask to speak with our nurse.  We are located at [address] [place] [address].    CALL OUR OFFICE -[phone]  IF YOU HAVE:   Chills or fever above 101 F.  Increased redness, warmth, or drainage at your incisions.  Significant bleeding.  Pain not relieved by your pain medication or rest.  Increasing pain after the first 48 hours.  Any other concerns or questions.     **If you have questions or concerns about your procedure,  call Dr. Jonas at [phone]**

## 2024-05-13 NOTE — ANESTHESIA POSTPROCEDURE EVALUATION
Patient: Waqar Ariza    Procedure: Procedure(s):  HERNIORRHAPHY, INGUINAL, ROBOT-ASSISTED, LAPAROSCOPIC, USING DA JACKELIN XI       Anesthesia Type:  General    Note:  Disposition: Inpatient   Postop Pain Control: Uneventful            Sign Out: Well controlled pain   PONV: No   Neuro/Psych: Uneventful            Sign Out: Acceptable/Baseline neuro status   Airway/Respiratory: Uneventful            Sign Out: Acceptable/Baseline resp. status   CV/Hemodynamics: Uneventful            Sign Out: Acceptable CV status; No obvious hypovolemia; No obvious fluid overload   Other NRE: NONE   DID A NON-ROUTINE EVENT OCCUR?            Last vitals:  Vitals Value Taken Time   /66 05/13/24 1030   Temp 36.1  C (96.9  F) 05/13/24 1030   Pulse 73 05/13/24 1030   Resp 11 05/13/24 1030   SpO2 98 % 05/13/24 1030       Electronically Signed By: Anna Dunham  May 13, 2024  4:07 PM

## 2024-05-13 NOTE — ANESTHESIA PROCEDURE NOTES
Airway       Patient location during procedure: OR       Procedure Start/Stop Times: 5/13/2024 7:43 AM  Staff -        Anesthesiologist:  Anna Dunham       CRNA: Jonah Salinas APRN CRNA       Performed By: CRNA  Consent for Airway        Urgency: elective  Indications and Patient Condition       Indications for airway management: wes-procedural       Induction type:intravenous       Mask difficulty assessment: 2 - vent by mask + OA or adjuvant +/- NMBA    Final Airway Details       Final airway type: endotracheal airway       Successful airway: ETT - single  Endotracheal Airway Details        ETT size (mm): 8.0       Cuffed: yes       Successful intubation technique: direct laryngoscopy       DL Blade Type: MAC 3       Grade View of Cords: 1       Adjucts: stylet       Position: Right       Measured from: lips       Secured at (cm): 23       Bite block used: None    Post intubation assessment        Placement verified by: capnometry, equal breath sounds and chest rise        Number of attempts at approach: 1       Number of other approaches attempted: 0       Secured with: tape       Ease of procedure: easy       Dentition: Intact and Unchanged    Medication(s) Administered   Medication Administration Time: 5/13/2024 7:43 AM

## 2024-05-13 NOTE — OP NOTE
Preoperative diagnosis: Bilateral inguinal hernia    Postoperative diagnosis: Bilateral inguinal hernia    Procedure: Robotic assisted laparoscopic transabdominal preperitoneal bilateral inguinal hernia repair with mesh    Surgeon: César Jonas MD    Assistant: Terri Ye PA-C, Physician assistant first assistant was necessary during the performance of this procedure for expertise in patient positioning, prepping, draping, trocar placement, camera management, retraction and exposure, and suctioning.    Anesthesia: General endotracheal    Estimated blood loss: 5 cc        Indication for procedure: This is a 68-year-old gentleman who presented to my office with symptomatic inguinal hernia.  We had an exhaustive discussion regarding therapeutic options.  It was ultimately elected to proceed with robotic assisted laparoscopic repair.  The potential risks of bleeding, infection, neurovascular injury to the vas deferens or testicle, bowel or bladder injury, recurrent hernia, chronic pain were all reviewed in great detail.  The patient's questions were thoroughly answered.  Informed consent was provided.    Procedure: After informed consent was obtained the patient was taken to the operating room and placed supine in the operating room table.  Following the induction of adequate general endotracheal anesthesia, the patient's abdomen was shaved, prepped and draped in usual fashion.  A surgeon initiated timeout was then completed.  Appropriate IV antibiotics were administered for surgical prophylaxis.  Local anesthetic was then injected at the site above the umbilicus at the midline.  A skin incision was made at this location and the Veress needle was passed into an intra-abdominal position.  The intra-abdominal position of the needle was confirmed using the saline drop test.  A carbon dioxide pneumoperitoneum was then established.  After adequate insufflation the insufflation needle was removed and replaced with  an 8 mm robotic port.  Under direct vision after the infiltration of local anesthetic 2 additional 8 mm ports were placed at the level of the first port in the right and left abdomen.  The robot was then uneventfully docked.  I assumed control of the surgeon console.  Initial surveillance of the lower abdomen and pelvis revealed bilateral direct inguinal hernias with the left side being larger than the right.  Starting on the right side the peritoneum was grasped well above the inguinal canal and incised. I then began dissecting into the preperitoneal plane.  This dissection was carried down into the inguinal canal.  Peritoneum was mobilized well down below Jaime's ligament as well as laterally to expose the transverse abdominis.  The spermatic cord and its contents were skeletonized.  Direct hernia was present and reduced.  With the right-sided dissection complete I turned my attention to the left side.  A mirrored peritoneal incision was made and an identical preperitoneal dissection was performed.  A much larger direct hernia was present on the left-hand side.  This was completely reduced.  The spermatic cord again was skeletonized without evidence of indirect hernia.  With the dissections completed I elected to repair the hernias with individual pieces of 12 x 16 centimeter ProGrip mesh.  The meshes were introduced into the abdominal cavity and then appropriately positioned within the inguinal canals bilaterally.  With the meshes in good position the peritoneum was sutured closed using 2.0 strata fix suture.  The robot was then undocked.  The trochars were opened and the carbon dioxide was massaged from the abdomen.  Trochars were subsequently removed and the skin incisions were closed with subcuticular 4-0 Vicryl sutures.  Sponge, needle and instrument counts were correct.  Patient tolerated this well.  He was awakened in the operating room, extubated and taken to the recovery room in stable  condition.    César Jonas MD

## 2024-05-16 ENCOUNTER — TELEPHONE (OUTPATIENT)
Dept: SURGERY | Facility: CLINIC | Age: 69
End: 2024-05-16
Payer: MEDICARE

## 2024-05-16 NOTE — TELEPHONE ENCOUNTER
Patient had a robot assisted lap inguinal hernia repair 5/13/24 BRP. Patient calling today about constipation.  Was given Senokot, that didn't seem to be working so he started Miralax, and that doesn't seem to be working either, wondering if he can take both at the same time.      Please call    Phone: [phone]   Message ok

## 2024-05-16 NOTE — TELEPHONE ENCOUNTER
Procedure: Robotic assisted laparoscopic transabdominal preperitoneal bilateral inguinal hernia repair with mesh    Date: 5/13/24    Surgeon: Pritesh    Patient calling to discuss constipation. He reports that overall, he feels well. He is moving around a lot.  Minimal pain.  Only utilizing ibuprofen at this point for pain management    Started using senna after discharge. Switched to miralax daily. Prune juice today.  Can he use both miralax and senna?    No n/v. No abdominal distention. No increasing abdominal pain after eating.     He is passing flatus    No other concerns    Informed him that yes, he can use both senna and miralax. Recommend increasing miralax two twice daily. Continue prune juice    If no relief, try milk of magnesia with prune juice    Continue pushing fluids and moving around as able    He agreed. No other questions or concerns at this time.    He will call PRN    Niya Shaikh RN-BSN

## 2024-05-24 ENCOUNTER — DOCUMENTATION ONLY (OUTPATIENT)
Dept: OTHER | Facility: CLINIC | Age: 69
End: 2024-05-24
Payer: MEDICARE

## 2024-05-28 ENCOUNTER — TELEPHONE (OUTPATIENT)
Dept: SURGERY | Facility: CLINIC | Age: 69
End: 2024-05-28
Payer: MEDICARE

## 2024-05-28 NOTE — TELEPHONE ENCOUNTER
[facility]  Post op call note - ROBOTIC INGUINAL HERNIA REPAIR  May 28, 2024       Waqar Ariza was called for an update regarding his recovery.  He underwent a robotic bilateral inguinal hernia repair by Dr. Jonas on 5/13.  Today he tells me he is doing well.  He currently does not need any pain medications and only needed 2 tablets of narcotics following surgery.  He did have some constipation initially which resolved after starting miralax as directed by our clinic.  The patient states he is slowly resuming normal activity but avoiding heavy lifting.  Unfortunately he did have a fall last week and noted more soreness at left inguinal region for a couple days following this.  He states there is some swelling to this location which is smaller than what it was prior to surgery and looks different than his hernia did.  He does not have pain at this location.  He states his wounds are healing well and the steri strips are off.  He denies any erythema or drainage at his wounds.  The patient denies changes in urination.    He may submerge incisions.  Given larger left sided inguinal hernia, not unexpected to have post-operative swelling/seroma.  He may utilize heat and ice packs to assist with resolution of fluid.  He is reassured that this will continue to improve with time.  He should call our office if severe/tearing pain or new lump.  He was advised to advance his activity as tolerated with no heavy lifting > 20 lbs or strenuous exercise for 3 weeks post-op.  The patient states all of his questions were answered and he understands our discussion.  He agrees to follow up as needed and to call our office with any concerns.      Terri Ye PA-C  [facility]  [phone]        Please route or send letter to:  Primary Care Provider (PCP)

## 2024-06-13 SDOH — HEALTH STABILITY: PHYSICAL HEALTH: ON AVERAGE, HOW MANY DAYS PER WEEK DO YOU ENGAGE IN MODERATE TO STRENUOUS EXERCISE (LIKE A BRISK WALK)?: 7 DAYS

## 2024-06-13 SDOH — HEALTH STABILITY: PHYSICAL HEALTH: ON AVERAGE, HOW MANY MINUTES DO YOU ENGAGE IN EXERCISE AT THIS LEVEL?: 30 MIN

## 2024-06-13 ASSESSMENT — SOCIAL DETERMINANTS OF HEALTH (SDOH): HOW OFTEN DO YOU GET TOGETHER WITH FRIENDS OR RELATIVES?: ONCE A WEEK

## 2024-06-18 NOTE — PATIENT INSTRUCTIONS
"Patient Education   Preventive Care Advice   This is general advice we often give to help people stay healthy. Your care team may have specific advice just for you. Please talk to your care team about your own preventive care needs.  Lifestyle  Exercise at least 150 minutes each week (30 minutes a day, 5 days a week).  Do muscle strengthening activities 2 days a week. These help control your weight and prevent disease.  No smoking.  Wear sunscreen to prevent skin cancer.  Have your home tested for radon every 2 to 5 years. Radon is a colorless, odorless gas that can harm your lungs. To learn more, go to www.health.[facility].mn.us and search for \"Radon in Homes.\"  Keep guns unloaded and locked up in a safe place like a safe or gun vault, or, use a gun lock and hide the keys. Always lock away bullets separately. To learn more, visit [employer].gov and search for \"safe gun storage.\"  Nutrition  Eat 5 or more servings of fruits and vegetables each day.  Try wheat bread, brown rice and whole grain pasta (instead of white bread, rice, and pasta).  Get enough calcium and vitamin D. Check the label on foods and aim for 100% of the RDA (recommended daily allowance).  Regular exams  Have a dental exam and cleaning every 6 months.  See your health care team every year to talk about:  Any changes in your health.  Any medicines your care team has prescribed.  Preventive care, family planning, and ways to prevent chronic diseases.  Shots (vaccines)   HPV shots (up to age 26), if you've never had them before.  Hepatitis B shots (up to age 59), if you've never had them before.  COVID-19 shot: Get this shot when it's due.  Flu shot: Get a flu shot every year.  Tetanus shot: Get a tetanus shot every 10 years.  Pneumococcal, hepatitis A, and RSV shots: Ask your care team if you need these based on your risk.  Shingles shot (for age 50 and up).  General health tests  Diabetes screening:  Starting at age 35, Get screened for diabetes at least " every 3 years.  If you are younger than age 35, ask your care team if you should be screened for diabetes.  Cholesterol test: At age 39, start having a cholesterol test every 5 years, or more often if advised.  Bone density scan (DEXA): At age 50, ask your care team if you should have this scan for osteoporosis (brittle bones).  Hepatitis C: Get tested at least once in your life.  Abdominal aortic aneurysm screening: Talk to your doctor about having this screening if you:  Have ever smoked; and  Are biologically male; and  Are between the ages of 65 and 75.  STIs (sexually transmitted infections)  Before age 24: Ask your care team if you should be screened for STIs.  After age 24: Get screened for STIs if you're at risk. You are at risk for STIs (including HIV) if:  You are sexually active with more than one person.  You don't use condoms every time.  You or a partner was diagnosed with a sexually transmitted infection.  If you are at risk for HIV, ask about PrEP medicine to prevent HIV.  Get tested for HIV at least once in your life, whether you are at risk for HIV or not.  Cancer screening tests  Cervical cancer screening: If you have a cervix, begin getting regular cervical cancer screening tests at age 21. Most people who have regular screenings with normal results can stop after age 65. Talk about this with your provider.  Breast cancer scan (mammogram): If you've ever had breasts, begin having regular mammograms starting at age 40. This is a scan to check for breast cancer.  Colon cancer screening: It is important to start screening for colon cancer at age 45.  Have a colonoscopy test every 10 years (or more often if you're at risk) Or, ask your provider about stool tests like a FIT test every year or Cologuard test every 3 years.  To learn more about your testing options, visit: www.[employer]/227315.pdf.  For help making a decision, visit: [employer]/av64596.  Prostate cancer screening test: If you have a  prostate and are age 55 to 69, ask your provider if you would benefit from a yearly prostate cancer screening test.  Lung cancer screening: If you are a current or former smoker age 50 to 80, ask your care team if ongoing lung cancer screenings are right for you.  For informational purposes only. Not to replace the advice of your health care provider. Copyright   2023 [employer] [facility]. All rights reserved. Clinically reviewed by the  [employer] [facility]. Cancer Genetics 431500 - REV 04/24.  Hearing Loss: Care Instructions  Overview     Hearing loss is a sudden or slow decrease in how well you hear. It can range from slight to profound. Permanent hearing loss can occur with aging. It also can happen when you are exposed long-term to loud noise. Examples include listening to loud music, riding motorcycles, or being around other loud machines.  Hearing loss can affect your work and home life. It can make you feel lonely or depressed. You may feel that you have lost your independence. But hearing aids and other devices can help you hear better and feel connected to others.  Follow-up care is a key part of your treatment and safety. Be sure to make and go to all appointments, and call your doctor if you are having problems. It's also a good idea to know your test results and keep a list of the medicines you take.  How can you care for yourself at home?  Avoid loud noises whenever possible. This helps keep your hearing from getting worse.  Always wear hearing protection around loud noises.  Wear a hearing aid as directed.  A professional can help you pick a hearing aid that will work best for you.  You can also get hearing aids over the counter for mild to moderate hearing loss.  Have hearing tests as your doctor suggests. They can show whether your hearing has changed. Your hearing aid may need to be adjusted.  Use other devices as needed. These may include:  Telephone amplifiers and hearing aids that  "can connect to a television, stereo, radio, or microphone.  Devices that use lights or vibrations. These alert you to the doorbell, a ringing telephone, or a baby monitor.  Television closed-captioning. This shows the words at the bottom of the screen. Most new TVs can do this.  TTY (text telephone). This lets you type messages back and forth on the telephone instead of talking or listening. These devices are also called TDD. When messages are typed on the keyboard, they are sent over the phone line to a receiving TTY. The message is shown on a monitor.  Use text messaging, social media, and email if it is hard for you to communicate by telephone.  Try to learn a listening technique called speechreading. It is not lipreading. You pay attention to people's gestures, expressions, posture, and tone of voice. These clues can help you understand what a person is saying. Face the person you are talking to, and have them face you. Make sure the lighting is good. You need to see the other person's face clearly.  Think about counseling if you need help to adjust to your hearing loss.  When should you call for help?  Watch closely for changes in your health, and be sure to contact your doctor if:    You think your hearing is getting worse.     You have new symptoms, such as dizziness or nausea.   Where can you learn more?  Go to https://www."[employer].net/patiented  Enter R798 in the search box to learn more about \"Hearing Loss: Care Instructions.\"  Current as of: September 27, 2023               Content Version: 14.0    1552-4190 [employer].   Care instructions adapted under license by your healthcare professional. If you have questions about a medical condition or this instruction, always ask your healthcare professional. [employer] disclaims any warranty or liability for your use of this information.         "

## 2024-06-20 ENCOUNTER — OFFICE VISIT (OUTPATIENT)
Dept: FAMILY MEDICINE | Facility: CLINIC | Age: 69
End: 2024-06-20

## 2024-06-20 VITALS
DIASTOLIC BLOOD PRESSURE: 82 MMHG | HEART RATE: 74 BPM | HEIGHT: 72 IN | WEIGHT: 181.2 LBS | BODY MASS INDEX: 24.54 KG/M2 | OXYGEN SATURATION: 100 % | SYSTOLIC BLOOD PRESSURE: 140 MMHG

## 2024-06-20 DIAGNOSIS — M17.0 PRIMARY OSTEOARTHRITIS OF BOTH KNEES: ICD-10-CM

## 2024-06-20 DIAGNOSIS — F33.42 MAJOR DEPRESSIVE DISORDER, RECURRENT EPISODE, IN FULL REMISSION (H): ICD-10-CM

## 2024-06-20 DIAGNOSIS — D12.6 ADENOMATOUS POLYP OF COLON, UNSPECIFIED PART OF COLON: ICD-10-CM

## 2024-06-20 DIAGNOSIS — Z12.5 SCREENING FOR PROSTATE CANCER: ICD-10-CM

## 2024-06-20 DIAGNOSIS — I34.0 NONRHEUMATIC MITRAL VALVE REGURGITATION: ICD-10-CM

## 2024-06-20 DIAGNOSIS — Z00.00 ENCOUNTER FOR MEDICARE ANNUAL WELLNESS EXAM: Primary | ICD-10-CM

## 2024-06-20 DIAGNOSIS — I25.10 CORONARY ARTERY DISEASE INVOLVING NATIVE CORONARY ARTERY OF NATIVE HEART WITHOUT ANGINA PECTORIS: ICD-10-CM

## 2024-06-20 LAB
% GRANULOCYTES: 68.9 % (ref 42.2–75.2)
ALBUMIN SERPL BCG-MCNC: 4.4 G/DL (ref 3.5–5.2)
ALP SERPL-CCNC: 54 U/L (ref 40–150)
ALT SERPL W P-5'-P-CCNC: 34 U/L (ref 0–70)
ANION GAP SERPL CALCULATED.3IONS-SCNC: 14 MMOL/L (ref 7–15)
AST SERPL W P-5'-P-CCNC: 27 U/L (ref 0–45)
BILIRUB SERPL-MCNC: 0.6 MG/DL
BUN SERPL-MCNC: 25 MG/DL (ref 8–23)
CALCIUM SERPL-MCNC: 9.4 MG/DL (ref 8.8–10.2)
CHLORIDE SERPL-SCNC: 100 MMOL/L (ref 98–107)
CHOLESTEROL: 132 MG/DL (ref 100–199)
CREAT SERPL-MCNC: 0.86 MG/DL (ref 0.67–1.17)
DEPRECATED HCO3 PLAS-SCNC: 23 MMOL/L (ref 22–29)
EGFRCR SERPLBLD CKD-EPI 2021: >90 ML/MIN/1.73M2
FASTING STATUS PATIENT QL REPORTED: YES
FASTING?: YES
GLUCOSE SERPL-MCNC: 85 MG/DL (ref 70–99)
HCT VFR BLD AUTO: 41.9 % (ref 39–51)
HDL (RMG): 35 MG/DL (ref 40–?)
HEMOGLOBIN: 14.2 G/DL (ref 13.4–17.5)
LDL CALCULATED (RMG): 85 MG/DL (ref 0–130)
LYMPHOCYTES NFR BLD AUTO: 23.1 % (ref 20.5–51.1)
MCH RBC QN AUTO: 30.4 PG (ref 27–31)
MCHC RBC AUTO-ENTMCNC: 34 G/DL (ref 33–37)
MCV RBC AUTO: 89.3 FL (ref 80–100)
MONOCYTES NFR BLD AUTO: 8 % (ref 1.7–9.3)
PLATELET # BLD AUTO: 169 K/UL (ref 140–450)
POTASSIUM SERPL-SCNC: 4.4 MMOL/L (ref 3.4–5.3)
PROT SERPL-MCNC: 7.7 G/DL (ref 6.4–8.3)
PSA SERPL DL<=0.01 NG/ML-MCNC: 0.47 NG/ML (ref 0–4.5)
RBC # BLD AUTO: 4.69 X10/CMM (ref 4.2–5.9)
SODIUM SERPL-SCNC: 137 MMOL/L (ref 135–145)
TRIGLYCERIDES (RMG): 63 MG/DL (ref 0–149)
WBC # BLD AUTO: 5.7 X10/CMM (ref 3.8–11)

## 2024-06-20 PROCEDURE — 85025 COMPLETE CBC W/AUTO DIFF WBC: CPT | Performed by: FAMILY MEDICINE

## 2024-06-20 PROCEDURE — 80061 LIPID PANEL: CPT | Mod: QW | Performed by: FAMILY MEDICINE

## 2024-06-20 PROCEDURE — 99214 OFFICE O/P EST MOD 30 MIN: CPT | Mod: 25 | Performed by: FAMILY MEDICINE

## 2024-06-20 PROCEDURE — G0439 PPPS, SUBSEQ VISIT: HCPCS | Performed by: FAMILY MEDICINE

## 2024-06-20 PROCEDURE — 36415 COLL VENOUS BLD VENIPUNCTURE: CPT | Performed by: FAMILY MEDICINE

## 2024-06-20 PROCEDURE — G0103 PSA SCREENING: HCPCS | Mod: ORL | Performed by: FAMILY MEDICINE

## 2024-06-20 PROCEDURE — 80053 COMPREHEN METABOLIC PANEL: CPT | Mod: ORL | Performed by: FAMILY MEDICINE

## 2024-06-20 NOTE — PROGRESS NOTES
Preventive Care Visit  [facility]  Surinder Catherine MD, Family Medicine  Jun 20, 2024    Assessment & Plan     Encounter for Medicare annual wellness exam    Major depressive disorder, recurrent episode, in full remission (H24)  Sertraline no longer and working to keep in a good place with exercise and personal care    Adenomatous polyp of colon, unspecified part of colon  7 year follow up     Nonrheumatic mitral valve regurgitation  No current symptoms    Coronary artery disease involving native coronary artery of native heart without angina pectoris  On statin and ASA    Primary osteoarthritis of both knees  Routine OA  cares    Patient has been advised of split billing requirements and indicates understanding: Yes        Counseling  Appropriate preventive services were discussed with this patient, including applicable screening as appropriate for fall prevention, nutrition, physical activity, Tobacco-use cessation, weight loss and cognition.  Checklist reviewing preventive services available has been given to the patient.  Reviewed patient's diet, addressing concerns and/or questions.   He is at risk for psychosocial distress and has been provided with information to reduce risk.   The patient was provided with written information regarding signs of hearing loss.       Work on weight loss  Regular exercise    No follow-ups on file.    Subjective   Angel is a 68 year old, presenting for the following:  Physical (Fasting/), Hearing Screening, and Surgical Followup (Hernia surgery in May 2024)          Health Care Directive  Patient has a Health Care Directive on file  Advance care planning document is on file and is current.    HPI  Here for AWV and to follow ujp on the hernia repair,  HTN  Hyperchol        6/13/2024   General Health   How would you rate your overall physical health? Good   Feel stress (tense, anxious, or unable to sleep) Only a little      (!) STRESS CONCERN      6/13/2024    Nutrition   Diet: Regular (no restrictions)            6/13/2024   Exercise   Days per week of moderate/strenous exercise 7 days   Average minutes spent exercising at this level 30 min            6/13/2024   Social Factors   Frequency of gathering with friends or relatives Once a week   Worry food won't last until get money to buy more No   Food not last or not have enough money for food? No   Do you have housing? (Housing is defined as stable permanent housing and does not include staying ouside in a car, in a tent, in an abandoned building, in an overnight shelter, or couch-surfing.) Yes   Are you worried about losing your housing? No   Lack of transportation? No   Unable to get utilities (heat,electricity)? No            6/13/2024   Fall Risk   Fallen 2 or more times in the past year? No   Trouble with walking or balance? No             6/13/2024   Activities of Daily Living- Home Safety   Needs help with the following daily activites None of the above   Safety concerns in the home None of the above            6/13/2024   Dental   Dentist two times every year? Yes            6/13/2024   Hearing Screening   Hearing concerns? (!) I FEEL THAT PEOPLE ARE MUMBLING OR NOT SPEAKING CLEARLY.    (!) I NEED TO ASK PEOPLE TO SPEAK UP OR REPEAT THEMSELVES.       6/20/2024  7:48 AM   Hearing Screen Results    Right Ear - 500Hz/25dB REFER !    Right Ear - 1000Hz/20dB Pass    Right Ear - 2000Hz/20dB Pass    Right Ear - 4000Hz/20dB Pass    Left Ear - 500Hz/25dB Pass    Left Ear - 1000Hz/20dB Pass    Left Ear - 2000Hz/20dB REFER !    Left Ear - 4000Hz/20dB Pass    Hearing Screen Results Pass       Legend:  ! Abnormal   Multiple values from one day are sorted in reverse-chronological order         6/13/2024   Driving Risk Screening   Patient/family members have concerns about driving No            6/13/2024   General Alertness/Fatigue Screening   Have you been more tired than usual lately? No            6/13/2024   Urinary  "Incontinence Screening   Bothered by leaking urine in past 6 months No            6/13/2024   TB Screening   Were you born outside of the [place]? No            Today's PHQ-9 Score:       4/28/2024     3:36 PM   PHQ-9 SCORE   PHQ-9 Total Score MyChart 1 (Minimal depression)   PHQ-9 Total Score 1           6/13/2024   Substance Use   Alcohol more than 3/day or more than 7/wk Not Applicable   Do you have a current opioid prescription? No   How severe/bad is pain from 1 to 10? 0/10 (No Pain)   Do you use any other substances recreationally? No        Social History     Tobacco Use    Smoking status: Never    Smokeless tobacco: Never   Vaping Use    Vaping status: Never Used   Substance Use Topics    Alcohol use: Yes     Comment: special occasions only    Drug use: No           6/13/2024   AAA Screening   Family history of Abdominal Aortic Aneurysm (AAA)? No      Last PSA: No results found for: \"PSA\"  ASCVD Risk   The ASCVD Risk score (Germán ALVES, et al., 2019) failed to calculate for the following reasons:    The valid total cholesterol range is 130 to 320 mg/dL            Reviewed and updated as needed this visit by Provider                    Past Medical History:   Diagnosis Date    Abnormal stress test 10/28/2019    Adenomatous polyp of colon 2007    q 5 year colonoscopy    Chest pain     Depressive disorder     Hypertension     Mitral valve disorder 11/24/2014     Problem list name updated by automated process. Provider to review    Vertigo     last episode was April- 2024     Past Surgical History:   Procedure Laterality Date    CATARACT IOL, RT/LT  2004, 2009    Cataract IOL RT/LT    HERNIORRHAPHY, INGUINAL, BILATERAL, ROBOT-ASSISTED, LAPAROSCOPIC, USING DA JACKELIN XI Bilateral 5/13/2024    Procedure: HERNIORRHAPHY, INGUINAL, ROBOT-ASSISTED, LAPAROSCOPIC, USING DA JACKELIN XI;  Surgeon: César Jonas MD;  Location: [facility] OR    retinal detachment  2005     Current providers sharing in care for this patient " "include:  Patient Care Team:  Surinder Catherine MD as PCP - General (Family Practice)  Surinder Catherine MD as Assigned PCP  Yamila Gonzalez RD as Diabetes Educator (Dietitian, Registered)  César Jonas MD as Assigned Surgical Provider    The following health maintenance items are reviewed in Epic and correct as of today:  Health Maintenance   Topic Date Due    COVID-19 Vaccine (7 - 2023-24 season) 11/20/2023    LIPID  06/12/2024    PHQ-9  10/29/2024    MEDICARE ANNUAL WELLNESS VISIT  06/20/2025    FALL RISK ASSESSMENT  06/20/2025    GLUCOSE  06/19/2026    ADVANCE CARE PLANNING  06/20/2029    COLORECTAL CANCER SCREENING  08/15/2029    DTAP/TDAP/TD IMMUNIZATION (3 - Td or Tdap) 09/11/2030    HEPATITIS C SCREENING  Completed    DEPRESSION ACTION PLAN  Completed    INFLUENZA VACCINE  Completed    Pneumococcal Vaccine: 65+ Years  Completed    ZOSTER IMMUNIZATION  Completed    RSV VACCINE (Pregnancy & 60+)  Completed    IPV IMMUNIZATION  Aged Out    HPV IMMUNIZATION  Aged Out    MENINGITIS IMMUNIZATION  Aged Out    RSV MONOCLONAL ANTIBODY  Aged Out         Review of Systems  Constitutional, HEENT, cardiovascular, pulmonary, GI, , musculoskeletal, neuro, skin, endocrine and psych systems are negative, except as otherwise noted.     Objective    Exam  BP (!) 140/82   Pulse 74   Ht 1.816 m (5' 11.5\")   Wt 82.2 kg (181 lb 3.2 oz)   SpO2 100%   BMI 24.92 kg/m     Estimated body mass index is 24.92 kg/m  as calculated from the following:    Height as of this encounter: 1.816 m (5' 11.5\").    Weight as of this encounter: 82.2 kg (181 lb 3.2 oz).    Physical Exam  GENERAL: alert and no distress  EYES: Eyes grossly normal to inspection, PERRL and conjunctivae and sclerae normal  HENT: ear canals and TM's normal, nose and mouth without ulcers or lesions  NECK: no adenopathy, no asymmetry, masses, or scars  RESP: lungs clear to auscultation - no rales, rhonchi or wheezes  CV: regular rate and rhythm, " normal S1 S2, no S3 or S4, no murmur, click or rub, no peripheral edema  ABDOMEN: soft, nontender, no hepatosplenomegaly, no masses and bowel sounds normal   (male): normal male genitalia without lesions or urethral discharge, no hernia  MS: no gross musculoskeletal defects noted, no edema  SKIN: no suspicious lesions or rashes  NEURO: Normal strength and tone, mentation intact and speech normal  PSYCH: mentation appears normal, affect normal/bright         6/20/2024   Mini Cog   Clock Draw Score 2 Normal   3 Item Recall 3 objects recalled   Mini Cog Total Score 5               Signed Electronically by: Surinder Catherine MD

## 2024-12-17 ENCOUNTER — ANCILLARY PROCEDURE (OUTPATIENT)
Dept: GENERAL RADIOLOGY | Facility: CLINIC | Age: 69
End: 2024-12-17
Attending: PHYSICIAN ASSISTANT
Payer: MEDICARE

## 2024-12-17 ENCOUNTER — OFFICE VISIT (OUTPATIENT)
Dept: ORTHOPEDICS | Facility: CLINIC | Age: 69
End: 2024-12-17
Payer: MEDICARE

## 2024-12-17 VITALS
BODY MASS INDEX: 23.43 KG/M2 | SYSTOLIC BLOOD PRESSURE: 136 MMHG | WEIGHT: 173 LBS | DIASTOLIC BLOOD PRESSURE: 80 MMHG | HEIGHT: 72 IN

## 2024-12-17 DIAGNOSIS — M54.2 NECK PAIN ON RIGHT SIDE: Primary | ICD-10-CM

## 2024-12-17 DIAGNOSIS — M54.2 NECK PAIN ON RIGHT SIDE: ICD-10-CM

## 2024-12-17 DIAGNOSIS — M54.12 CERVICAL RADICULOPATHY AT C8: ICD-10-CM

## 2024-12-17 DIAGNOSIS — M62.838 MUSCLE SPASM: ICD-10-CM

## 2024-12-17 PROCEDURE — 99203 OFFICE O/P NEW LOW 30 MIN: CPT | Performed by: PHYSICIAN ASSISTANT

## 2024-12-17 PROCEDURE — 72040 X-RAY EXAM NECK SPINE 2-3 VW: CPT | Mod: TC | Performed by: RADIOLOGY

## 2024-12-17 RX ORDER — METHYLPREDNISOLONE 4 MG/1
TABLET ORAL
Qty: 21 TABLET | Refills: 0 | Status: SHIPPED | OUTPATIENT
Start: 2024-12-17

## 2024-12-17 NOTE — LETTER
12/17/2024      Waqar Ariza  [address]  [place] [address]      Dear Colleague,    Thank you for referring your patient, Waqar Ariza, to the [facility] [place]. Please see a copy of my visit note below.    ASSESSMENT & PLAN  Waqar Ariza today for his right neck shoulder pain with numbness tingling into his right arm into the little digit.  We discussed that his symptoms do not seem to be coming from his shoulder but rather from his cervical spine.  We discussed that he has a component of muscular pain as well as nerve impingement most likely the C8 nerve based on his description of symptoms.  At this time recommend to treat him with a Medrol Dosepak as well as physical therapy.  Referral medication was sent to his pharmacy and medication was counseled with him today.  He will follow-up with our spine team for further evaluation should symptoms fail to improve.  He was understanding of our plan and in agreement.  All questions answered.    Rody Wells PA-C  [facility]    -----  Chief Complaint   Patient presents with     Right Shoulder - Pain       SUBJECTIVE  Waqar Ariza is a/an 69 year old right-handed male who is seen as a WALK IN patient for evaluation of right shoulder.  Onset was Ezra 12/15/2024. Pain is located right shoulder- arm would fall asleep and have tingling with numbness. Symptoms are worsened by n/a.  He has tried ibuprofen. Associated symptoms include  none. Denies numbness/tingling and instability. Describes as dull achy pain- not sure if caused from strength training twice a week.  Similar symptoms a couple years ago that was treated with his  where they did cervical exercises and what sounds like traction to relieve his symptoms.    The patient is seen alone    Prior injury/Surgical history of affected joint: no  Social History/Occupation: Retired    REVIEW OF SYSTEMS:  Pertinent  positives/negative: As stated above in HPI    OBJECTIVE:  There were no vitals taken for this visit.   General: Alert and in no distress  Skin: no visable rashes  CV: Extremities appear well perfused   Resp: normal respiratory effort, no conversational dyspnea   Psych: normal mood, affect  MSK:   Shoulder exam:  Right    Range of Motion:   Forward flexion: 170   Abduction:  90  Internal rotation: T12  External rotation: 90    Inspection:    There is no visible deformity  There is no erythema or signs of infection  There is no open wounds    Palpation:     Sternoclavicular joint nontender   Acromioclavicular joint nontender   Subacromial space nontender   Posterior shoulder and periscapular region nontender     Strength:   Abduction (arm at side) 5/5  Internal rotation (arm at side) 5/5  External rotation (arm at side)  5/5  Adduction 5/5    Impingement tests:   Neer (forward flexion) painfree  [place] (IR with arm flexed, abducted) painfree  Empty can (thumb down in scaption position) painfree  Crossover (AC joint compression) painfree  [place] (AC joint/labral compression) painfree    Cervical Spine    Inspection:  There is no erythema, open wounds, or drainage  There is no swelling or ecchymosis    Palpation:  Tenderness to palpation about the right paraspinal muscle/trap  No tenderness to palpation about the midline spine    Range of motion:  Flexion: able   Extension: able  Rotation: able    Strength:  Shoulder Abduction: 5/5  Biceps: 5/5  Triceps: 5/5  Wrist extension: 5/5  Wrist flexion: 5/5  Hang : 5/5    Sensation/Vascular:  Sensation: noted decrease (tingling) in the right little digit; remainder of the upper extremity is intact.  Radial pulse is palpable, hand is warm and well perfused    Special Tests  Spurling's: Negative  Reflexes: Intact and equal to contralateral extremity         RADIOLOGY:  Final results and radiologist's interpretation available in the [facility] health record.  Images below were  personally reviewed and discussed with the patient in the office today.  My personal interpretation of the performed imaging: Cervical x-rays demonstrate multilevel degenerative changes with loss of cervical lordosis.  No acute fracture is seen.        Again, thank you for allowing me to participate in the care of your patient.        Sincerely,        Rody Wells PA-C

## 2024-12-17 NOTE — PATIENT INSTRUCTIONS
1. Neck pain on right side        Recommendations  - OTC Tylenol 1000 mg and a NSAID (Ibuprofen 600 mg, Advil 600 mg) every 8 hours for pain.   -Topical creams such as Voltaren gel, Asperecreme, Biofreeze, china gel, Tiger Balm or blue emu.   - Apply heat to the affected area  -Recommend a medrol pack to help reduce inflammation about the nerve; take in morning    Physical/Occupational therapy referral placed    Follow up with Dr Vega in spine clinic, if symptoms worsen or do not improve    -Call direct clinic number [[phone]] at any time with questions or concerns.    -Orthopedic Walk in Monday through Thursday 8 am to 6 pm, Friday 8 am to 5 pm, Saturday 8 am to 12 pm at [address] [address] [facility] [address] [place].

## 2024-12-17 NOTE — PROGRESS NOTES
ASSESSMENT & PLAN  Waqar Ariza today for his right neck shoulder pain with numbness tingling into his right arm into the little digit.  We discussed that his symptoms do not seem to be coming from his shoulder but rather from his cervical spine.  We discussed that he has a component of muscular pain as well as nerve impingement most likely the C8 nerve based on his description of symptoms.  At this time recommend to treat him with a Medrol Dosepak as well as physical therapy.  Referral medication was sent to his pharmacy and medication was counseled with him today.  He will follow-up with our spine team for further evaluation should symptoms fail to improve.  He was understanding of our plan and in agreement.  All questions answered.    [facility]    -----  Chief Complaint   Patient presents with    Right Shoulder - Pain       SUBJECTIVE  Waqar Ariza is a/an 69 year old right-handed male who is seen as a WALK IN patient for evaluation of right shoulder.  Onset was Ezra 12/15/2024. Pain is located right shoulder- arm would fall asleep and have tingling with numbness. Symptoms are worsened by n/a.  He has tried ibuprofen. Associated symptoms include  none. Denies numbness/tingling and instability. Describes as dull achy pain- not sure if caused from strength training twice a week.  Similar symptoms a couple years ago that was treated with his  where they did cervical exercises and what sounds like traction to relieve his symptoms.    The patient is seen alone    Prior injury/Surgical history of affected joint: no  Social History/Occupation: Retired    REVIEW OF SYSTEMS:  Pertinent positives/negative: As stated above in HPI    OBJECTIVE:  There were no vitals taken for this visit.   General: Alert and in no distress  Skin: no visable rashes  CV: Extremities appear well perfused   Resp: normal respiratory effort, no conversational dyspnea    Psych: normal mood, affect  MSK:   Shoulder exam:  Right    Range of Motion:   Forward flexion: 170   Abduction:  90  Internal rotation: T12  External rotation: 90    Inspection:    There is no visible deformity  There is no erythema or signs of infection  There is no open wounds    Palpation:     Sternoclavicular joint nontender   Acromioclavicular joint nontender   Subacromial space nontender   Posterior shoulder and periscapular region nontender     Strength:   Abduction (arm at side) 5/5  Internal rotation (arm at side) 5/5  External rotation (arm at side)  5/5  Adduction 5/5    Impingement tests:   Neer (forward flexion) painfree  Pedersen (IR with arm flexed, abducted) painfree  Empty can (thumb down in scaption position) painfree  Crossover (AC joint compression) painfree  [place] (AC joint/labral compression) painfree    Cervical Spine    Inspection:  There is no erythema, open wounds, or drainage  There is no swelling or ecchymosis    Palpation:  Tenderness to palpation about the right paraspinal muscle/trap  No tenderness to palpation about the midline spine    Range of motion:  Flexion: able   Extension: able  Rotation: able    Strength:  Shoulder Abduction: 5/5  Biceps: 5/5  Triceps: 5/5  Wrist extension: 5/5  Wrist flexion: 5/5  Hang : 5/5    Sensation/Vascular:  Sensation: noted decrease (tingling) in the right little digit; remainder of the upper extremity is intact.  Radial pulse is palpable, hand is warm and well perfused    Special Tests  Spurling's: Negative  Reflexes: Intact and equal to contralateral extremity         RADIOLOGY:  Final results and radiologist's interpretation available in the [facility] health record.  Images below were personally reviewed and discussed with the patient in the office today.  My personal interpretation of the performed imaging: Cervical x-rays demonstrate multilevel degenerative changes with loss of cervical lordosis.  No acute fracture is seen.

## 2024-12-18 ENCOUNTER — THERAPY VISIT (OUTPATIENT)
Dept: PHYSICAL THERAPY | Facility: CLINIC | Age: 69
End: 2024-12-18
Attending: PHYSICIAN ASSISTANT
Payer: MEDICARE

## 2024-12-18 DIAGNOSIS — M54.12 CERVICAL RADICULOPATHY AT C8: ICD-10-CM

## 2024-12-18 DIAGNOSIS — M62.838 MUSCLE SPASM: ICD-10-CM

## 2024-12-18 DIAGNOSIS — M54.2 NECK PAIN ON RIGHT SIDE: ICD-10-CM

## 2024-12-18 PROCEDURE — 97110 THERAPEUTIC EXERCISES: CPT | Mod: GP

## 2024-12-18 PROCEDURE — 97161 PT EVAL LOW COMPLEX 20 MIN: CPT | Mod: GP

## 2024-12-18 PROCEDURE — 97530 THERAPEUTIC ACTIVITIES: CPT | Mod: GP

## 2024-12-18 NOTE — PROGRESS NOTES
PHYSICAL THERAPY EVALUATION  Type of Visit: Evaluation       Fall Risk Screen:  Fall screen completed by: PT  Have you fallen 2 or more times in the past year?: No  Have you fallen and had an injury in the past year?: No  Is patient a fall risk?: No    Subjective         Presenting condition or subjective complaint: (Patient-Rptd) Recommendation as follow up from [facility] vist yesterday.  Tension in neck/back that sometimes radiates down the right arm.  Date of onset: 12/18/24    Relevant medical history:     Dates & types of surgery: (Patient-Rptd) Hernia surgery - 5/13/24    Prior diagnostic imaging/testing results: (Patient-Rptd) X-ray     Prior therapy history for the same diagnosis, illness or injury: (Patient-Rptd) No      Prior Level of Function  Transfers:   Ambulation:   ADL:   IADL:     Living Environment  Social support: (Patient-Rptd) Alone   Type of home: (Patient-Rptd) House; 1 level; Basement   Stairs to enter the home:         Ramp: (Patient-Rptd) No   Stairs inside the home: (Patient-Rptd) Yes (Patient-Rptd) 12 Is there a railing: (Patient-Rptd) Yes     Help at home: (Patient-Rptd) None  Equipment owned:       Employment: (Patient-Rptd) No    Hobbies/Interests: (Patient-Rptd) Strength training/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    Physical Therapist Assessment    KEY PT FINDINGS:  1) Negative Spurling's bilateral  2) Positive median nerve tension screen on R  3) Negative myotome or dermatomal loss    Signs and symptoms are consistent with cervical radiculopathy.      Subjective History    Patient was referred by Rody Wells for  Neck pain on right side [M54.2], Cervical radiculopathy at C8 [M54.12], Muscle spasm [M62.838]   Referring provider plan: Waqar Ariza today for his right neck shoulder pain with numbness tingling into his  "right arm into the little digit.  We discussed that his symptoms do not seem to be coming from his shoulder but rather from his cervical spine.  We discussed that he has a component of muscular pain as well as nerve impingement most likely the C8 nerve based on his description of symptoms.  At this time recommend to treat him with a Medrol Dosepak as well as physical therapy.  Referral medication was sent to his pharmacy and medication was counseled with him today.  He will follow-up with our spine team for further evaluation should symptoms fail to improve.  He was understanding of our plan and in agreement.  All questions answered.     PT Subjective:   Patient is a 69 year old male presenting to outpatient physical therapy with neck pain with radiating symptoms down the right upper extremity. He noticed that his right arm was \"falling asleep\" a few times when lying on his right side. It would go away but would have residual pain in his right shoulder and some tingling. He had followed up to the walk in clinic and with his chiropractor, where they took x-rays and prescribed prednisone. He has not started the prednisone pack yet and wanted to trial PT first. He first started noticing these symptoms a few weeks ago. He is noticing some slight strength changes as well in his right upper extremity. He wonders if he has overdid it with strength training or if it's from anxiety/stress and carrying it in his shoulders/neck. The numbness is more of the main focus at the time.  Pain Level at Rest: 0/10  Pain Level with Use: 3/10  Pain Location: shoulder  Pain Quality: sore/tenderness  Pain Frequency: intermittent  Pain is Worst: waking in the morning  Pain is Exacerbated By: waking up in the morning and too much anxiety  Pain is Relieved By: rest  Pain Progression: Improved     PMH:   Patient Active Problem List   Diagnosis    Adenomatous polyp of colon    Nonrheumatic mitral valve regurgitation    CAD (coronary artery " disease)    Primary osteoarthritis of both knees    Major depressive disorder, recurrent episode, in full remission (H)    Current moderate episode of major depressive disorder without prior episode (H)     Medications:   Current Outpatient Medications   Medication Sig Dispense Refill    ASTAXANTHIN PO Take by mouth daily      Glucos-Chondroit-Hyaluron-MSM (GLUCOSAMINE CHONDROITIN JOINT PO)       latanoprost (XALATAN) 0.005 % ophthalmic solution INSTILL 1 DROP INTO BOTH EYES EVERY DAY IN THE EVENING      lisinopril (ZESTRIL) 5 MG tablet Take 1 tablet (5 mg) by mouth daily 90 tablet 3    methylPREDNISolone (MEDROL DOSEPAK) 4 MG tablet therapy pack Follow Package Directions 21 tablet 0    Multiple Vitamins-Minerals (ICAPS AREDS 2 PO) Take by mouth daily      Omega-3 Fatty Acids (FISH OIL PO) Take 1 tablet by mouth daily.      rosuvastatin (CRESTOR) 5 MG tablet Take 1 tablet (5 mg) by mouth every 48 hours 90 tablet 3    UNABLE TO FIND Take 1 tablet by mouth daily MEDICATION NAME: immune support supplement       No current facility-administered medications for this visit.           Imaging:   Narrative & Impression   XR CERVICAL SPINE 2/3 VIEWS 12/17/2024 9:43 AM      HISTORY: Neck pain on right side.     COMPARISON: None.                                                                      IMPRESSION: No gross cervical vertebral body height loss is  identified. Trace retrolisthesis of C2 on C3. Trace retrolisthesis of  C5 on C6. Straightening of the normal cervical lordosis. Minimal/mild  levoconvex curvature of the upper to mid thoracic spine.     Mild to moderate disc space narrowing C6-C7 and mild disc space  narrowing at C5-C6. Small multilevel marginal anterior endplate  osteophytes. Scattered degenerative changes of the facet joints. The  prevertebral soft tissues appear normal in thickness. On the  open-mouth odontoid view, no obvious displaced fracture of the base of  the odontoid process, and the lateral  masses of C1 and C2 appear  symmetric.     JAYLEN CHAVEZ MD      Red Flag Screening: negative  Prior Treatment Includes: Chiropractic  Lifestyle History:  Occupation: Retired  Experience with physical activity:  2x/week, Yard work, Therapy Dog  General Health Assessment: NT.  Referral recommended? No  Additional Considerations: Hernia surgery in May    Patient Goals for Physical Therapy: Return to prior level of function.          Objective   Objective Examination    Posture/Observation: Rounded shoulders, Forward head    Shoulder Screen: WFL    Thoracic Screen: Observed mild postural kyphosis    Cervical AROM: (Major, Moderate, Minimal or Nil loss)  Movement Loss Eliceo Mod Min Nil Pain   Protrusion        Flexion    X    Retraction        Extension   X     Left Rotation  X      Right Rotation  X      Left Side Bending  X      Right Side bending  X      Quadrant Testing:      Palpation:  TTP to R UT    Dermatomes:   Left Right   C1 Normal (light touch) Normal (light touch)   C2 Normal (light touch) Normal (light touch)   C3 Normal (light touch) Normal (light touch)   C4 Normal (light touch) Normal (light touch)   C5 Normal (light touch) Normal (light touch)   C6 Normal (light touch) Hypo (light touch)   C7 Normal (light touch) Hypo (light touch)   C8 Normal (light touch) Hypo (light touch)   T1 Normal (light touch) Normal (light touch)       Myotomes:WNL    DTR S:    Left Right   C5 (Biceps) 1 1   C6 (Brachioradialis) 1 1   C7 (Triceps) 1 1   L4 (Quad)     S1 (Achilles)         CORD SIGNS: WNL    Special tests:    Left Right   Alar Ligament    Cervical Flexion-Rotation     Cervical Rot/Lateral Flex     Compression     Distraction     Spurling s Negative  Negative    Thoracic Outlet Screen (Rose [place], Adson)     Transverse Ligament     Vertebral Artery     Cotton Roll Test     Craniocervical Flexor Endurance Test     Mannheimer Test                    Neural Tension:    ULTT1 (median): (+) on R  ULTT2b  (radial): Not Tested  ULTT3 (ulnar): Not Tested    Joint Mobility:    Atlantooccipital Joint   C0-C1     Atlantoaxial Joint (CFRT)    L ROT R ROT   C1-C2      Lower Cervical Spine (PIVM)    L SG R SG   C2-C3     C3-C4     C4-C5 Hypo Hypo   C5-C6 Hypo Hypo   C6-C7 Hypo Hypo   C7-T1      Cervical Rotation Lateral Flexion Test (Pratibha)    L ROT R ROT   1st Rib          Strength Testing:  Resisted Isometrics C-Spine: N/A  Latissimus Dorsi MMT: Not Tested  Middle Trapezius/Rhomboids MMT: Not Tested  Lower Trapezius MMT: Not Tested    Flexibility: Not Tested    Craniocervical Flexion Endurance Test: N/A    Sensorimotor testing:   Cervical joint position sense (Norms <7.1 cm): N/A        Assessment & Plan   CLINICAL IMPRESSIONS  Medical Diagnosis: Neck pain on right side (M54.2), Cervical radiculopathy at C8 (M54.12), Muscle spasm (M62.838)    Treatment Diagnosis: Neck pain with radiating pain   Impression/Assessment: Patient is a 69 year old male with neck and shoulder complaints.  The following significant findings have been identified: Pain, Decreased ROM/flexibility, Decreased joint mobility, Decreased strength, Impaired sensation, Impaired muscle performance, Decreased activity tolerance, and Impaired posture. These impairments interfere with their ability to perform work tasks, recreational activities, household chores, household mobility, and community mobility as compared to previous level of function.     Clinical Decision Making (Complexity):  Clinical Presentation: Stable/Uncomplicated  Clinical Presentation Rationale: based on medical and personal factors listed in PT evaluation  Clinical Decision Making (Complexity): Low complexity    PLAN OF CARE  Treatment Interventions:  Interventions: Manual Therapy, Neuromuscular Re-education, Therapeutic Activity, Therapeutic Exercise, Self-Care/Home Management    Long Term Goals     PT Goal 1  Goal Identifier: LTG1  Goal Description: Patient will be able to report  zero numbness in right fingertips for one whole week  Rationale: to maximize safety and independence with performance of ADLs and functional tasks;to maximize safety and independence within the home;to maximize safety and independence within the community;to maximize safety and independence with transportation;to maximize safety and independence with self cares  Target Date: 01/29/25  PT Goal 3  Goal Identifier: LTG2  Goal Description: Patient will report at least a 11.75 point improvement on the NDI outcome measure  Rationale: to maximize safety and independence with performance of ADLs and functional tasks;to maximize safety and independence within the home;to maximize safety and independence within the community;to maximize safety and independence with transportation;to maximize safety and independence with self cares  Target Date: 01/29/25      Frequency of Treatment: 1x/wk  Duration of Treatment: 6 weeks    Recommended Referrals to Other Professionals:   Education Assessment:   Learner/Method: Patient    Risks and benefits of evaluation/treatment have been explained.   Patient/Family/caregiver agrees with Plan of Care.     Evaluation Time:     PT Eval, Low Complexity Minutes (52894): 20       Signing Clinician: Alli Graves PT        [facility]                                                                                   OUTPATIENT PHYSICAL THERAPY      PLAN OF TREATMENT FOR OUTPATIENT REHABILITATION   Patient's Last Name, First Name, PRANAYNICK MatiasisamarzahiraWaqar  J YOB: 1955   Provider's Name   [facility]   Medical Record No.  [id number]     Onset Date: 12/18/24  Start of Care Date: 12/18/24     Medical Diagnosis:  Neck pain on right side (M54.2), Cervical radiculopathy at C8 (M54.12), Muscle spasm (M62.838)      PT Treatment Diagnosis:  Neck pain with radiating pain Plan of Treatment  Frequency/Duration: 1x/wk/ 6  weeks    Certification date from 12/18/24 to 01/29/25         See note for plan of treatment details and functional goals     Alli Graves, PT                         I CERTIFY THE NEED FOR THESE SERVICES FURNISHED UNDER        THIS PLAN OF TREATMENT AND WHILE UNDER MY CARE     (Physician attestation of this document indicates review and certification of the therapy plan).              Referring Provider:  Rody Wells    Initial Assessment  See Epic Evaluation- Start of Care Date: 12/18/24

## 2024-12-19 ENCOUNTER — MYC MEDICAL ADVICE (OUTPATIENT)
Dept: ORTHOPEDICS | Facility: CLINIC | Age: 69
End: 2024-12-19
Payer: MEDICARE

## 2024-12-26 ENCOUNTER — THERAPY VISIT (OUTPATIENT)
Dept: PHYSICAL THERAPY | Facility: CLINIC | Age: 69
End: 2024-12-26
Payer: MEDICARE

## 2024-12-26 DIAGNOSIS — M54.2 NECK PAIN ON RIGHT SIDE: Primary | ICD-10-CM

## 2024-12-26 DIAGNOSIS — M62.838 MUSCLE SPASM: ICD-10-CM

## 2024-12-26 DIAGNOSIS — M54.12 CERVICAL RADICULOPATHY AT C8: ICD-10-CM

## 2024-12-30 ENCOUNTER — OFFICE VISIT (OUTPATIENT)
Dept: FAMILY MEDICINE | Facility: CLINIC | Age: 69
End: 2024-12-30

## 2024-12-30 VITALS
DIASTOLIC BLOOD PRESSURE: 71 MMHG | BODY MASS INDEX: 23.9 KG/M2 | HEART RATE: 91 BPM | OXYGEN SATURATION: 100 % | SYSTOLIC BLOOD PRESSURE: 137 MMHG | WEIGHT: 173.8 LBS

## 2024-12-30 DIAGNOSIS — R20.2 PARESTHESIA: Primary | ICD-10-CM

## 2024-12-30 PROCEDURE — G2211 COMPLEX E/M VISIT ADD ON: HCPCS | Performed by: FAMILY MEDICINE

## 2024-12-30 PROCEDURE — 99213 OFFICE O/P EST LOW 20 MIN: CPT | Performed by: FAMILY MEDICINE

## 2024-12-30 ASSESSMENT — ANXIETY QUESTIONNAIRES
GAD7 TOTAL SCORE: 6
5. BEING SO RESTLESS THAT IT IS HARD TO SIT STILL: NOT AT ALL
6. BECOMING EASILY ANNOYED OR IRRITABLE: SEVERAL DAYS
7. FEELING AFRAID AS IF SOMETHING AWFUL MIGHT HAPPEN: SEVERAL DAYS
1. FEELING NERVOUS, ANXIOUS, OR ON EDGE: SEVERAL DAYS
3. WORRYING TOO MUCH ABOUT DIFFERENT THINGS: SEVERAL DAYS
8. IF YOU CHECKED OFF ANY PROBLEMS, HOW DIFFICULT HAVE THESE MADE IT FOR YOU TO DO YOUR WORK, TAKE CARE OF THINGS AT HOME, OR GET ALONG WITH OTHER PEOPLE?: NOT DIFFICULT AT ALL
2. NOT BEING ABLE TO STOP OR CONTROL WORRYING: SEVERAL DAYS
GAD7 TOTAL SCORE: 6
IF YOU CHECKED OFF ANY PROBLEMS ON THIS QUESTIONNAIRE, HOW DIFFICULT HAVE THESE PROBLEMS MADE IT FOR YOU TO DO YOUR WORK, TAKE CARE OF THINGS AT HOME, OR GET ALONG WITH OTHER PEOPLE: NOT DIFFICULT AT ALL
7. FEELING AFRAID AS IF SOMETHING AWFUL MIGHT HAPPEN: SEVERAL DAYS
GAD7 TOTAL SCORE: 6
4. TROUBLE RELAXING: SEVERAL DAYS

## 2024-12-30 ASSESSMENT — PATIENT HEALTH QUESTIONNAIRE - PHQ9
SUM OF ALL RESPONSES TO PHQ QUESTIONS 1-9: 2
SUM OF ALL RESPONSES TO PHQ QUESTIONS 1-9: 2
10. IF YOU CHECKED OFF ANY PROBLEMS, HOW DIFFICULT HAVE THESE PROBLEMS MADE IT FOR YOU TO DO YOUR WORK, TAKE CARE OF THINGS AT HOME, OR GET ALONG WITH OTHER PEOPLE: NOT DIFFICULT AT ALL

## 2024-12-30 NOTE — PROGRESS NOTES
Answers submitted by the patient for this visit:  Patient Health Questionnaire (Submitted on 12/30/2024)  If you checked off any problems, how difficult have these problems made it for you to do your work, take care of things at home, or get along with other people?: Not difficult at all  PHQ9 TOTAL SCORE: 2  Patient Health Questionnaire (G7) (Submitted on 12/30/2024)  ANDREA 7 TOTAL SCORE: 6  General Questionnaire (Submitted on 12/30/2024)  Chief Complaint: Chronic problems general questions HPI Form  How many days per week do you miss taking your medication?: 0  General Concern (Submitted on 12/30/2024)  Chief Complaint: Chronic problems general questions HPI Form  What is the reason for your visit today?: Side effects of recent new medication from [facility] walk in clinic  When did your symptoms begin?: 3-7 days ago  What are your symptoms?: Tingling/numbness in feet, muscle aches in legs, anxiety elevated.  How would you describe these symptoms?: Mild  Are your symptoms:: Staying the same  Have you had these symptoms before?: No  Is there anything that makes you feel better?: Relaxing on bed with music  Questionnaire about: Chronic problems general questions HPI Form (Submitted on 12/30/2024)  Chief Complaint: Chronic problems general questions HPI Form   12/30/2024  9:04 AM   Vitals - Patient Reported    Systolic (Patient Reported) 128    Diastolic (Patient Reported) 77      Really was a reaction to high dose steroids.  Problem(s) Oriented visit      ROS:  General and Resp. completed and negative except as noted above     HISTORY:   reports current alcohol use.   reports that he has never smoked. He has never used smokeless tobacco.    Past Medical History:   Diagnosis Date    Abnormal stress test 10/28/2019    Adenomatous polyp of colon 2007    Chest pain     Depressive disorder     Hypertension     Mitral valve disorder 11/24/2014    Vertigo      Past Surgical History:   Procedure Laterality Date    CATARACT IOL,  "RT/LT  2004, 2009    Cataract IOL RT/LT    HERNIORRHAPHY, INGUINAL, BILATERAL, ROBOT-ASSISTED, LAPAROSCOPIC, USING DA JACKELIN XI Bilateral 5/13/2024    Procedure: HERNIORRHAPHY, INGUINAL, ROBOT-ASSISTED, LAPAROSCOPIC, USING DA JACKELIN XI;  Surgeon: César Jonas MD;  Location: [facility] OR    retinal detachment  2005       EXAM:  BP: 137/71   Pulse: 91    Temp: Data Unavailable    Wt Readings from Last 2 Encounters:   12/30/24 78.8 kg (173 lb 12.8 oz)   12/17/24 78.5 kg (173 lb)       BMI= Body mass index is 23.9 kg/m .    EXAM:  APPEARANCE: = Relaxed and in no distress  Resp effort = Calm regular breathing  Ext (edema) = No pretibial edema noted or elsewhere  Musculsktl =  Strength and ROM of major joints are within normal limits  SKIN = absent significant rashes or lesions   Recent/Remote Memory = Alert and Oriented x 3      Assessment/Plan:  Angel was seen today for recheck medication.    Diagnoses and all orders for this visit:    Paresthesia    Slowly improving  Continue PT    COUNSELING:   reports that he has never smoked. He has never used smokeless tobacco.    Estimated body mass index is 23.9 kg/m  as calculated from the following:    Height as of 12/17/24: 1.816 m (5' 11.5\").    Weight as of this encounter: 78.8 kg (173 lb 12.8 oz).   The longitudinal plan of care for the diagnosis(es)/condition(s) as documented were addressed during this visit. Due to the added complexity in care, I will continue to support Angel in the subsequent management and with ongoing continuity of care.    Appropriate preventive services were discussed with this patient, including applicable screening as appropriate for cardiovascular disease, diabetes, osteopenia/osteoporosis, and glaucoma.  As appropriate for age/gender, discussed screening for colorectal cancer, prostate cancer, breast cancer, and cervical cancer. Checklist reviewing preventive services available has been given to the patient.    Reviewed patients plan of care and " provided an AVS. The Basic Care Plan (routine screening as documented in Health Maintenance) for Waqar meets the Care Plan requirement. This Care Plan has been established and reviewed with the  Patient.      The following health maintenance items are reviewed in Epic and correct as of today:  Health Maintenance   Topic Date Due    MEDICARE ANNUAL WELLNESS VISIT  06/20/2025    BMP  06/20/2025    LIPID  06/20/2025    FALL RISK ASSESSMENT  06/20/2025    PHQ-9  06/30/2025    GLUCOSE  06/20/2027    ADVANCE CARE PLANNING  06/20/2029    COLORECTAL CANCER SCREENING  08/15/2029    DTAP/TDAP/TD IMMUNIZATION (3 - Td or Tdap) 09/11/2030    HEPATITIS C SCREENING  Completed    DEPRESSION ACTION PLAN  Completed    INFLUENZA VACCINE  Completed    Pneumococcal Vaccine: 50+ Years  Completed    ZOSTER IMMUNIZATION  Completed    RSV VACCINE  Completed    COVID-19 Vaccine  Completed    HPV IMMUNIZATION  Aged Out    MENINGITIS IMMUNIZATION  Aged Out    RSV MONOCLONAL ANTIBODY  Aged Out       Surinder Catherine  [facility]  For any issues my office # is [phone]

## 2025-01-09 ENCOUNTER — THERAPY VISIT (OUTPATIENT)
Dept: PHYSICAL THERAPY | Facility: CLINIC | Age: 70
End: 2025-01-09
Payer: MEDICARE

## 2025-01-09 DIAGNOSIS — M54.12 CERVICAL RADICULOPATHY AT C8: ICD-10-CM

## 2025-01-09 DIAGNOSIS — M54.2 NECK PAIN ON RIGHT SIDE: Primary | ICD-10-CM

## 2025-01-09 DIAGNOSIS — M62.838 MUSCLE SPASM: ICD-10-CM

## 2025-01-23 ENCOUNTER — THERAPY VISIT (OUTPATIENT)
Dept: PHYSICAL THERAPY | Facility: CLINIC | Age: 70
End: 2025-01-23
Payer: MEDICARE

## 2025-01-23 DIAGNOSIS — M54.2 NECK PAIN ON RIGHT SIDE: Primary | ICD-10-CM

## 2025-01-23 DIAGNOSIS — M62.838 MUSCLE SPASM: ICD-10-CM

## 2025-01-23 DIAGNOSIS — M54.12 CERVICAL RADICULOPATHY AT C8: ICD-10-CM

## 2025-03-08 ENCOUNTER — HEALTH MAINTENANCE LETTER (OUTPATIENT)
Age: 70
End: 2025-03-08

## 2025-04-02 ENCOUNTER — OFFICE VISIT (OUTPATIENT)
Dept: FAMILY MEDICINE | Facility: CLINIC | Age: 70
End: 2025-04-02

## 2025-04-02 VITALS
HEIGHT: 72 IN | HEART RATE: 68 BPM | WEIGHT: 189.4 LBS | DIASTOLIC BLOOD PRESSURE: 58 MMHG | BODY MASS INDEX: 25.65 KG/M2 | SYSTOLIC BLOOD PRESSURE: 128 MMHG | OXYGEN SATURATION: 98 %

## 2025-04-02 DIAGNOSIS — H61.21 IMPACTED CERUMEN OF RIGHT EAR: Primary | ICD-10-CM

## 2025-04-02 RX ORDER — CHOLECALCIFEROL (VITAMIN D3) 50 MCG
50 TABLET ORAL DAILY
COMMUNITY
Start: 2025-01-15

## 2025-04-02 NOTE — PROGRESS NOTES
SUBJECTIVE:    Waqar Ariza, is a 69 year old male presenting for the below:     Gradual dulled hearing right ear. No pain. No tinnitus. Prior h/o cerumen build up.       OBJECTIVE:  Vitals:    04/02/25 1439   BP: 128/58   BP Location: Left arm   Pulse: 68   SpO2: 98%   Weight: 85.9 kg (189 lb 6.4 oz)   Height: 1.829 m (6')    Body mass index is 25.69 kg/m .    General: no acute distress, cooperative with exam.  Eyes: no injection or drainage.  Ears: right canal with moderate soft wax occluding vision of TM's     Cerumen irrigated by nursing staff.  Post-procedure evaluation clear.  Patient tolerated procedure well.      ASSESSMENT / PLAN:        Impacted cerumen of right ear  -     TN REMOVAL IMPACTED CERUMEN IRRIGATION/LVG UNILAT    The longitudinal plan of care for the diagnosis(es)/condition(s) as documented were addressed during this visit. Due to the added complexity in care, I will continue to support Angel in the subsequent management and with ongoing continuity of care.

## 2025-04-02 NOTE — NURSING NOTE
Successful ear wash in Right ear. Patient tolerated procedure well.    Wax Removal Ordered: Quantity: 1 (unilateral)    Niya Loredo CMA  April 2, 2025

## 2025-04-15 ENCOUNTER — OFFICE VISIT (OUTPATIENT)
Dept: FAMILY MEDICINE | Facility: CLINIC | Age: 70
End: 2025-04-15

## 2025-04-15 VITALS
SYSTOLIC BLOOD PRESSURE: 116 MMHG | WEIGHT: 188 LBS | HEART RATE: 85 BPM | BODY MASS INDEX: 25.5 KG/M2 | DIASTOLIC BLOOD PRESSURE: 68 MMHG | TEMPERATURE: 97.9 F | OXYGEN SATURATION: 98 %

## 2025-04-15 DIAGNOSIS — R10.32 LEFT INGUINAL PAIN: ICD-10-CM

## 2025-04-15 DIAGNOSIS — Z87.19 H/O INGUINAL HERNIA REPAIR: Primary | ICD-10-CM

## 2025-04-15 DIAGNOSIS — Z98.890 H/O INGUINAL HERNIA REPAIR: Primary | ICD-10-CM

## 2025-04-15 PROCEDURE — 99213 OFFICE O/P EST LOW 20 MIN: CPT | Performed by: FAMILY MEDICINE

## 2025-04-15 PROCEDURE — 3074F SYST BP LT 130 MM HG: CPT | Performed by: FAMILY MEDICINE

## 2025-04-15 PROCEDURE — 3078F DIAST BP <80 MM HG: CPT | Performed by: FAMILY MEDICINE

## 2025-04-15 PROCEDURE — G2211 COMPLEX E/M VISIT ADD ON: HCPCS | Performed by: FAMILY MEDICINE

## 2025-04-15 ASSESSMENT — PATIENT HEALTH QUESTIONNAIRE - PHQ9
10. IF YOU CHECKED OFF ANY PROBLEMS, HOW DIFFICULT HAVE THESE PROBLEMS MADE IT FOR YOU TO DO YOUR WORK, TAKE CARE OF THINGS AT HOME, OR GET ALONG WITH OTHER PEOPLE: NOT DIFFICULT AT ALL
SUM OF ALL RESPONSES TO PHQ QUESTIONS 1-9: 2
SUM OF ALL RESPONSES TO PHQ QUESTIONS 1-9: 2

## 2025-04-15 NOTE — PROGRESS NOTES
SUBJECTIVE:    Waqar Ariza, is a 69 year old male presenting for the below:     Chief Complaint: Chronic problems general questions HPI Form  What is the reason for your visit today?: Discomfort in area of bilateral hernia surgery 5/13/2024  When did your symptoms begin?: 7 days ago  What are your symptoms?: Dull ache that does not seem to reslove with rest/ibuprofen  How would you describe these symptoms?: Mild  Are your symptoms:: Staying the same  Have you had these symptoms before?: Yes  Have you tried or received treatment for these symptoms before?: No  Is there anything that makes you feel better?: Heat    Denies fever, chills, nausea, vomiting. No improvement with rest. Feels reminiscent to initial hernia Dx. No masses, redness,  heat. Normal bowel habit.   Intermittent dull ache. Eases off with sleep. Worsens throughout the day.   Works with  once per week. Aware when training causes twinges.     OBJECTIVE:  Vitals:    04/15/25 1340 04/15/25 1344   BP: (!) 133/104 116/68   Pulse: 85    Temp: 97.9  F (36.6  C)    TempSrc: Oral    SpO2: 98%    Weight: 85.3 kg (188 lb)     Body mass index is 25.5 kg/m .  General: no acute distress, cooperative with exam.  Abdomen: normal bowel sounds, nontender, no palpable organomegaly.  Groin : tender to palpation left groin. Cough impulse negative no swelling.     ASSESSMENT / PLAN:      H/O inguinal hernia repair  Left inguinal pain  USS to evaluate integrity of hernia repair site.   Patient expressed understanding and agreement with the plan.  Red flag symptoms that should prompt emergent evaluation in interim discussed and understood.    -     US Hernia Evaluation; Future    The longitudinal plan of care for the diagnosis(es)/condition(s) as documented were addressed during this visit. Due to the added complexity in care, I will continue to support Angel in the subsequent management and with ongoing continuity of care.         Asc Procedure Text (A): After obtaining clear surgical margins the patient was sent to an ASC for surgical repair.  The patient understands they will receive post-surgical care and follow-up from the ASC physician.

## 2025-04-16 ENCOUNTER — MYC MEDICAL ADVICE (OUTPATIENT)
Dept: FAMILY MEDICINE | Facility: CLINIC | Age: 70
End: 2025-04-16

## 2025-04-16 ENCOUNTER — ANCILLARY PROCEDURE (OUTPATIENT)
Dept: ULTRASOUND IMAGING | Facility: CLINIC | Age: 70
End: 2025-04-16
Attending: FAMILY MEDICINE
Payer: MEDICARE

## 2025-04-16 DIAGNOSIS — Z87.19 H/O INGUINAL HERNIA REPAIR: ICD-10-CM

## 2025-04-16 DIAGNOSIS — Z98.890 H/O INGUINAL HERNIA REPAIR: ICD-10-CM

## 2025-04-16 DIAGNOSIS — R10.32 LEFT INGUINAL PAIN: ICD-10-CM

## 2025-04-16 PROCEDURE — 76705 ECHO EXAM OF ABDOMEN: CPT

## 2025-04-21 ENCOUNTER — OFFICE VISIT (OUTPATIENT)
Dept: SURGERY | Facility: CLINIC | Age: 70
End: 2025-04-21
Payer: MEDICARE

## 2025-04-21 VITALS
BODY MASS INDEX: 23.84 KG/M2 | RESPIRATION RATE: 16 BRPM | HEIGHT: 72 IN | SYSTOLIC BLOOD PRESSURE: 120 MMHG | OXYGEN SATURATION: 98 % | WEIGHT: 176 LBS | DIASTOLIC BLOOD PRESSURE: 82 MMHG | HEART RATE: 80 BPM

## 2025-04-21 DIAGNOSIS — R10.32 LEFT GROIN PAIN: Primary | ICD-10-CM

## 2025-04-21 PROCEDURE — 99213 OFFICE O/P EST LOW 20 MIN: CPT | Performed by: SURGERY

## 2025-04-21 PROCEDURE — 3079F DIAST BP 80-89 MM HG: CPT | Performed by: SURGERY

## 2025-04-21 PROCEDURE — 3074F SYST BP LT 130 MM HG: CPT | Performed by: SURGERY

## 2025-04-21 NOTE — LETTER
April 28, 2025          Surinder Catherine MD  [address] [place] [address]      RE:   Waqar Ariza 1955      Dear Colleague,    Thank you for referring your patient, Waqar Ariza, to [facility]. Please see a copy of my visit note below.    Patient presents in evaluation/referral from the above-mentioned provider.  Patient underwent robotic assisted laparoscopic bilateral inguinal hernia pair in May 2024.  He presents now with some concerns about ongoing mild discomfort basically identical to what he was experiencing prior to surgery.  He describes discomfort and pain in the left groin which wraps around to his back.  This seems increased with certain activities or positions.  He continues to exercise regularly.  He has no GI or bowel obstruction symptoms.  He describes that the discomfort is improved from what it was preoperatively just still present.  Is not limiting.  No radiation of pain into the testicle or thigh.     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  PSH:    has a past surgical history that includes cataract iol, rt/lt (2004, 2009); retinal detachment (2005); and Herniorrhaphy, Inguinal, Bilateral, Robot-Assisted, Laparoscopic, Using Da Aidee Xi (Bilateral, 5/13/2024).  Social History:   reports that he has never smoked. He has never used smokeless tobacco. He reports current alcohol use. He reports that he does not use drugs.  Family History:  family history includes Hypertension in his mother; No Known Problems in his brother, sister, and sister.  Medications/Allergies: Home medications and allergies reviewed.     ROS:  The 10 point Review of Systems is negative other than noted in the HPI.     Physical Exam:  /82   Pulse 80   Resp 16   Ht 1.829 m (6')   Wt  79.8 kg (176 lb)   SpO2 98%   BMI 23.87 kg/m    GENERAL: Generally appears well.  Psych: Alert and Oriented.  Normal affect  Eyes: Sclera clear  Respiratory:  Lungs clear to ausculation bilaterally with good air excursion  Cardiovascular:  Regular Rate and Rhythm with no murmurs gallops or rubs, normal peripheral pulses  GI: Abdomen Non Distended Soft Non-Tender  No hernias palpated..  Groin- I examined the patient in both the standing and supine positions. Right Groin- No hernia Palpated.  No tenderness on palpation. Left Groin- No hernia Palpated.  No tenderness on palpation. No scrotal or testicle abnormalities.  Lymphatic/Hematologic/Immune:  No femoral or cervical lymphadenopathy.  Integumentary:  No rashes  Neurological: grossly intact      All new lab and imaging data was reviewed.      Impression and Plan:  Patient is a 69 year old male with ongoing primarily left groin pain in the setting of previous bilateral hernia repair.     PLAN: I discussed with him his management options.  Certainly his presentation with discomfort and pain prior to surgery was a potential set up for ongoing discomfort or pain postoperatively.  That being said the symptoms are more mild than they were before surgery.  At this point would not recommend any manner of intervention.  There is no signs or symptoms that suggest neurologic or vascular entrapment of the spermatic cord.  Recommended core strengthening and flexibility exercises.  Should symptoms worsen would refer to medical pain management for trigger point injections.  He expressed understanding the conversation.  Can at this point follow-up on an as-needed basis.          Again, thank you for allowing me to participate in the care of your patient.      Sincerely,      César Jonas MD

## 2025-04-28 NOTE — PROGRESS NOTES
[facility]  Surgery Consultation    CONSULTATION REQUESTED BY:  Surinder Catherine [phone]    HPI: Patient presents in evaluation/referral from the above-mentioned provider.  Patient underwent robotic assisted laparoscopic bilateral inguinal hernia pair in May 2024.  He presents now with some concerns about ongoing mild discomfort basically identical to what he was experiencing prior to surgery.  He describes discomfort and pain in the left groin which wraps around to his back.  This seems increased with certain activities or positions.  He continues to exercise regularly.  He has no GI or bowel obstruction symptoms.  He describes that the discomfort is improved from what it was preoperatively just still present.  Is not limiting.  No radiation of pain into the testicle or thigh.    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  PSH:    has a past surgical history that includes cataract iol, rt/lt (2004, 2009); retinal detachment (2005); and Herniorrhaphy, Inguinal, Bilateral, Robot-Assisted, Laparoscopic, Using Da Aidee Xi (Bilateral, 5/13/2024).  Social History:   reports that he has never smoked. He has never used smokeless tobacco. He reports current alcohol use. He reports that he does not use drugs.  Family History:  family history includes Hypertension in his mother; No Known Problems in his brother, sister, and sister.  Medications/Allergies: Home medications and allergies reviewed.    ROS:  The 10 point Review of Systems is negative other than noted in the HPI.    Physical Exam:  /82   Pulse 80   Resp 16   Ht 1.829 m (6')   Wt 79.8 kg (176 lb)   SpO2 98%   BMI 23.87 kg/m    GENERAL: Generally appears well.  Psych: Alert and Oriented.  Normal affect  Eyes: Sclera clear  Respiratory:  Lungs clear to ausculation  bilaterally with good air excursion  Cardiovascular:  Regular Rate and Rhythm with no murmurs gallops or rubs, normal peripheral pulses  GI: Abdomen Non Distended Soft Non-Tender  No hernias palpated..  Groin- I examined the patient in both the standing and supine positions. Right Groin- No hernia Palpated.  No tenderness on palpation. Left Groin- No hernia Palpated.  No tenderness on palpation. No scrotal or testicle abnormalities.  Lymphatic/Hematologic/Immune:  No femoral or cervical lymphadenopathy.  Integumentary:  No rashes  Neurological: grossly intact     All new lab and imaging data was reviewed.     Impression and Plan:  Patient is a 69 year old male with ongoing primarily left groin pain in the setting of previous bilateral hernia repair.    PLAN: I discussed with him his management options.  Certainly his presentation with discomfort and pain prior to surgery was a potential set up for ongoing discomfort or pain postoperatively.  That being said the symptoms are more mild than they were before surgery.  At this point would not recommend any manner of intervention.  There is no signs or symptoms that suggest neurologic or vascular entrapment of the spermatic cord.  Recommended core strengthening and flexibility exercises.  Should symptoms worsen would refer to medical pain management for trigger point injections.  He expressed understanding the conversation.  Can at this point follow-up on an as-needed basis.      Thank you very much for this consult.    César Jonas M.D.  [facility]  [phone]    Please route or send letter to:  Primary Care Provider (PCP) and Referring Provider

## 2025-05-01 DIAGNOSIS — I10 ESSENTIAL HYPERTENSION: ICD-10-CM

## 2025-05-01 RX ORDER — LISINOPRIL 5 MG/1
5 TABLET ORAL DAILY
Qty: 90 TABLET | Refills: 3 | Status: SHIPPED | OUTPATIENT
Start: 2025-05-01

## 2025-06-03 DIAGNOSIS — I25.10 CORONARY ARTERY DISEASE INVOLVING NATIVE CORONARY ARTERY OF NATIVE HEART WITHOUT ANGINA PECTORIS: ICD-10-CM

## 2025-06-03 RX ORDER — ROSUVASTATIN CALCIUM 5 MG/1
5 TABLET, COATED ORAL
Qty: 45 TABLET | Refills: 7 | Status: SHIPPED | OUTPATIENT
Start: 2025-06-03

## 2025-06-03 NOTE — TELEPHONE ENCOUNTER
"Med: Rosuvastatin    LOV (related): 6/20/24    Last Lab: 6/20/24      Due for F/U around: 6/2025 for CPX    Next Appt: 6/24/25 for CPX        Cholesterol   Date Value Ref Range Status   06/20/2024 132 100 - 199 mg/dL Final   06/12/2023 118 100 - 199 mg/dL Final   06/09/2022 119 100 - 199 mg/dl Final   06/07/2021 176 100 - 199 mg/dL Final     HDL Cholesterol   Date Value Ref Range Status   06/12/2023 58 >39 mg/dL Final   06/07/2021 48 >39 mg/dL Final     HDL   Date Value Ref Range Status   06/20/2024 35 (A) >=40 mg/dL Final   06/09/2022 45 >=40 mg/dl Final     LDL Cholesterol Calculated   Date Value Ref Range Status   06/12/2023 49 0 - 99 mg/dL Final   06/07/2021 105 (H) 0 - 99 mg/dL Final     LDL CALCULATED ([facility])   Date Value Ref Range Status   06/20/2024 85 0 - 130 mg/dL Final   06/09/2022 55 0 - 130 mg/dl Final     Triglycerides   Date Value Ref Range Status   06/20/2024 63 0 - 149 mg/dL Final   06/12/2023 47 0 - 149 mg/dL Final   06/09/2022 96 0 - 149 mg/dl Final   06/07/2021 129 0 - 149 mg/dL Final     No results found for: \"CHOLHDLRATIO\"     "

## 2025-06-24 PROCEDURE — 80053 COMPREHEN METABOLIC PANEL: CPT | Mod: ORL | Performed by: FAMILY MEDICINE

## 2025-06-24 PROCEDURE — G0103 PSA SCREENING: HCPCS | Mod: ORL | Performed by: FAMILY MEDICINE

## 2025-06-25 ENCOUNTER — RESULTS FOLLOW-UP (OUTPATIENT)
Dept: FAMILY MEDICINE | Facility: CLINIC | Age: 70
End: 2025-06-25

## (undated) DEVICE — SU MONOCRYL 4-0 PS-2 18" UND Y496G

## (undated) DEVICE — DAVINCI XI GRASPER ENDOWRIST PROGRASP 470093

## (undated) DEVICE — LINEN TOWEL PACK X5 5464

## (undated) DEVICE — SOL WATER IRRIG 1000ML BOTTLE 2F7114

## (undated) DEVICE — GOWN IMPERVIOUS SPECIALTY XLG/XLONG 32474

## (undated) DEVICE — DAVINCI XI DRAPE COLUMN 470341

## (undated) DEVICE — SU STRATAFIX PDS PLUS 2-0 SPIRAL SH 23CM SXPP1B433

## (undated) DEVICE — DAVINCI XI OBTURATOR BLADELESS 8MM 470359

## (undated) DEVICE — PACK LAP CHOLE SLC15LCFSD

## (undated) DEVICE — DECANTER BAG 2002S

## (undated) DEVICE — BLADE CLIPPER 4406

## (undated) DEVICE — DAVINCI XI SEAL UNIVERSAL 5-12MM 470500

## (undated) DEVICE — EVAC SYSTEM CLEAR FLOW SC082500

## (undated) DEVICE — DRAPE SHEET REV FOLD 3/4 9349

## (undated) DEVICE — LIGHT HANDLE X2

## (undated) DEVICE — DAVINCI XI DRAPE ARM 470015

## (undated) DEVICE — PREP CHLORAPREP 26ML TINTED HI-LITE ORANGE 930815

## (undated) DEVICE — NDL INSUFFLATION 13GA 120MM C2201

## (undated) DEVICE — DAVINCI HOT SHEARS TIP COVER  400180

## (undated) DEVICE — ESU GROUND PAD UNIVERSAL W/O CORD

## (undated) DEVICE — ANTIFOG SOLUTION SEE SHARP 150M TROCAR SWABS 30978

## (undated) DEVICE — SYR 10ML FINGER CONTROL W/O NDL 309695

## (undated) DEVICE — LUBRICANT INST ELECTROLUBE EL101

## (undated) DEVICE — GLOVE BIOGEL PI SZ 8.0 40880

## (undated) RX ORDER — METOPROLOL TARTRATE 1 MG/ML
INJECTION, SOLUTION INTRAVENOUS
Status: DISPENSED
Start: 2019-11-20

## (undated) RX ORDER — ONDANSETRON 2 MG/ML
INJECTION INTRAMUSCULAR; INTRAVENOUS
Status: DISPENSED
Start: 2024-05-13

## (undated) RX ORDER — KETOROLAC TROMETHAMINE 30 MG/ML
INJECTION, SOLUTION INTRAMUSCULAR; INTRAVENOUS
Status: DISPENSED
Start: 2024-05-13

## (undated) RX ORDER — PROPOFOL 10 MG/ML
INJECTION, EMULSION INTRAVENOUS
Status: DISPENSED
Start: 2024-05-13

## (undated) RX ORDER — FENTANYL CITRATE 50 UG/ML
INJECTION, SOLUTION INTRAMUSCULAR; INTRAVENOUS
Status: DISPENSED
Start: 2024-05-13

## (undated) RX ORDER — NITROGLYCERIN 0.4 MG/1
TABLET SUBLINGUAL
Status: DISPENSED
Start: 2019-11-20

## (undated) RX ORDER — HYDROCODONE BITARTRATE AND ACETAMINOPHEN 5; 325 MG/1; MG/1
TABLET ORAL
Status: DISPENSED
Start: 2024-05-13

## (undated) RX ORDER — FENTANYL CITRATE 0.05 MG/ML
INJECTION, SOLUTION INTRAMUSCULAR; INTRAVENOUS
Status: DISPENSED
Start: 2024-05-13

## (undated) RX ORDER — CEFAZOLIN SODIUM/WATER 2 G/20 ML
SYRINGE (ML) INTRAVENOUS
Status: DISPENSED
Start: 2024-05-13

## (undated) RX ORDER — BUPIVACAINE HYDROCHLORIDE AND EPINEPHRINE 5; 5 MG/ML; UG/ML
INJECTION, SOLUTION EPIDURAL; INTRACAUDAL; PERINEURAL
Status: DISPENSED
Start: 2024-05-13

## (undated) RX ORDER — DEXAMETHASONE SODIUM PHOSPHATE 4 MG/ML
INJECTION, SOLUTION INTRA-ARTICULAR; INTRALESIONAL; INTRAMUSCULAR; INTRAVENOUS; SOFT TISSUE
Status: DISPENSED
Start: 2024-05-13